# Patient Record
Sex: FEMALE | Race: WHITE | Employment: OTHER | URBAN - METROPOLITAN AREA
[De-identification: names, ages, dates, MRNs, and addresses within clinical notes are randomized per-mention and may not be internally consistent; named-entity substitution may affect disease eponyms.]

---

## 2017-05-16 ENCOUNTER — HOSPITAL ENCOUNTER (OUTPATIENT)
Dept: PSYCHIATRY | Age: 37
Discharge: HOME | End: 2017-05-31
Admitting: PSYCHIATRY & NEUROLOGY

## 2017-05-16 VITALS — DIASTOLIC BLOOD PRESSURE: 78 MMHG | SYSTOLIC BLOOD PRESSURE: 130 MMHG | HEART RATE: 109 BPM

## 2017-05-18 ENCOUNTER — HOSPITAL ENCOUNTER (OUTPATIENT)
Dept: PSYCHIATRY | Age: 37
Discharge: HOME | End: 2017-05-18
Admitting: PSYCHIATRY & NEUROLOGY

## 2017-05-18 PROCEDURE — 99232 SBSQ HOSP IP/OBS MODERATE 35: CPT | Performed by: PHYSICIAN ASSISTANT

## 2017-05-18 ASSESSMENT — ENCOUNTER SYMPTOMS
BLURRED VISION: 0
CONSTIPATION: 0
VOMITING: 0
DIARRHEA: 0
NAUSEA: 0
ABDOMINAL PAIN: 0
BACK PAIN: 0
DOUBLE VISION: 0
SHORTNESS OF BREATH: 0

## 2017-05-22 ENCOUNTER — HOSPITAL ENCOUNTER (OUTPATIENT)
Dept: PSYCHIATRY | Age: 37
Discharge: HOME | End: 2017-05-22
Admitting: PSYCHIATRY & NEUROLOGY

## 2017-05-22 VITALS — SYSTOLIC BLOOD PRESSURE: 130 MMHG | RESPIRATION RATE: 16 BRPM | HEART RATE: 134 BPM | DIASTOLIC BLOOD PRESSURE: 90 MMHG

## 2017-05-23 ENCOUNTER — HOSPITAL ENCOUNTER (OUTPATIENT)
Dept: PSYCHIATRY | Age: 37
Discharge: HOME | End: 2017-05-23
Admitting: PSYCHIATRY & NEUROLOGY

## 2017-05-24 ENCOUNTER — HOSPITAL ENCOUNTER (OUTPATIENT)
Dept: PSYCHIATRY | Age: 37
Discharge: HOME | End: 2017-05-24
Admitting: PSYCHIATRY & NEUROLOGY

## 2017-05-24 PROCEDURE — 99214 OFFICE O/P EST MOD 30 MIN: CPT | Performed by: PHYSICIAN ASSISTANT

## 2017-05-24 RX ORDER — BENZTROPINE MESYLATE 0.5 MG/1
0.5 TABLET ORAL NIGHTLY
Qty: 30 TABLET | Refills: 0 | Status: SHIPPED | OUTPATIENT
Start: 2017-05-24 | End: 2018-01-05 | Stop reason: ALTCHOICE

## 2017-05-24 ASSESSMENT — ENCOUNTER SYMPTOMS
BACK PAIN: 0
DOUBLE VISION: 0
BLURRED VISION: 0
NAUSEA: 0
SHORTNESS OF BREATH: 0
HEARTBURN: 0
DIARRHEA: 0
VOMITING: 0
CONSTIPATION: 0
ABDOMINAL PAIN: 0

## 2017-06-01 ENCOUNTER — HOSPITAL ENCOUNTER (OUTPATIENT)
Dept: PSYCHIATRY | Age: 37
Discharge: HOME | End: 2017-06-30
Attending: PSYCHIATRY & NEUROLOGY | Admitting: PSYCHIATRY & NEUROLOGY

## 2018-01-20 PROBLEM — K52.9 COLITIS: Status: ACTIVE | Noted: 2018-01-20

## 2018-04-14 PROBLEM — R56.9 ALCOHOL WITHDRAWAL SEIZURE WITHOUT COMPLICATION (HCC): Status: ACTIVE | Noted: 2018-04-14

## 2018-04-14 PROBLEM — F10.930 ALCOHOL WITHDRAWAL SEIZURE WITHOUT COMPLICATION (HCC): Status: ACTIVE | Noted: 2018-04-14

## 2018-04-17 PROBLEM — F10.930 ALCOHOL WITHDRAWAL SEIZURE WITHOUT COMPLICATION (HCC): Status: RESOLVED | Noted: 2018-04-14 | Resolved: 2018-04-17

## 2018-04-17 PROBLEM — F10.10 ALCOHOL ABUSE: Status: ACTIVE | Noted: 2018-04-17

## 2018-04-17 PROBLEM — R56.9 ALCOHOL WITHDRAWAL SEIZURE WITHOUT COMPLICATION (HCC): Status: RESOLVED | Noted: 2018-04-14 | Resolved: 2018-04-17

## 2018-04-17 PROBLEM — F33.0 MDD (MAJOR DEPRESSIVE DISORDER), RECURRENT EPISODE, MILD (HCC): Status: ACTIVE | Noted: 2018-04-17

## 2018-04-26 PROBLEM — R74.01 TRANSAMINITIS: Status: ACTIVE | Noted: 2018-04-26

## 2018-12-09 ENCOUNTER — HOSPITAL ENCOUNTER (EMERGENCY)
Age: 38
Discharge: HOME | End: 2018-12-09
Attending: EMERGENCY MEDICINE
Payer: COMMERCIAL

## 2018-12-09 VITALS
DIASTOLIC BLOOD PRESSURE: 76 MMHG | TEMPERATURE: 98 F | OXYGEN SATURATION: 98 % | WEIGHT: 187.39 LBS | BODY MASS INDEX: 35.38 KG/M2 | RESPIRATION RATE: 16 BRPM | SYSTOLIC BLOOD PRESSURE: 119 MMHG | HEART RATE: 95 BPM | HEIGHT: 61 IN

## 2018-12-09 DIAGNOSIS — M43.6 TORTICOLLIS: Primary | ICD-10-CM

## 2018-12-09 PROCEDURE — 96372 THER/PROPH/DIAG INJ SC/IM: CPT

## 2018-12-09 PROCEDURE — 99283 EMERGENCY DEPT VISIT LOW MDM: CPT

## 2018-12-09 PROCEDURE — 6360000002 HC RX W HCPCS: Performed by: EMERGENCY MEDICINE

## 2018-12-09 RX ORDER — IBUPROFEN 800 MG/1
800 TABLET ORAL EVERY 6 HOURS PRN
Qty: 30 TABLET | Refills: 0 | Status: SHIPPED | OUTPATIENT
Start: 2018-12-09 | End: 2019-01-31

## 2018-12-09 RX ORDER — KETOROLAC TROMETHAMINE 30 MG/ML
60 INJECTION, SOLUTION INTRAMUSCULAR; INTRAVENOUS ONCE
Status: COMPLETED | OUTPATIENT
Start: 2018-12-09 | End: 2018-12-09

## 2018-12-09 RX ORDER — ORPHENADRINE CITRATE 30 MG/ML
60 INJECTION INTRAMUSCULAR; INTRAVENOUS ONCE
Status: COMPLETED | OUTPATIENT
Start: 2018-12-09 | End: 2018-12-09

## 2018-12-09 RX ORDER — CYCLOBENZAPRINE HCL 10 MG
10 TABLET ORAL 3 TIMES DAILY PRN
Qty: 30 TABLET | Refills: 0 | Status: SHIPPED | OUTPATIENT
Start: 2018-12-09 | End: 2018-12-19

## 2018-12-09 RX ADMIN — KETOROLAC TROMETHAMINE 60 MG: 30 INJECTION, SOLUTION INTRAMUSCULAR at 14:43

## 2018-12-09 RX ADMIN — ORPHENADRINE CITRATE 60 MG: 30 INJECTION INTRAMUSCULAR; INTRAVENOUS at 14:42

## 2018-12-09 ASSESSMENT — PAIN SCALES - GENERAL
PAINLEVEL_OUTOF10: 6
PAINLEVEL_OUTOF10: 5
PAINLEVEL_OUTOF10: 6

## 2018-12-09 ASSESSMENT — PAIN - FUNCTIONAL ASSESSMENT: PAIN_FUNCTIONAL_ASSESSMENT: 0-10

## 2018-12-09 ASSESSMENT — PAIN DESCRIPTION - PAIN TYPE
TYPE: ACUTE PAIN
TYPE: ACUTE PAIN

## 2018-12-09 ASSESSMENT — PAIN DESCRIPTION - ORIENTATION: ORIENTATION: RIGHT

## 2018-12-09 ASSESSMENT — PAIN DESCRIPTION - LOCATION: LOCATION: NECK

## 2018-12-09 NOTE — ED PROVIDER NOTES
[address] [facility]  eMERGENCY dEPARTMENT eNCOUnter      Pt Name: Kathern Kocher  MRN: [id number]  [place] 1980  Date of evaluation: 12/9/2018  Provider: Yahir Becerra MD    [address] [place] [address]       Chief Complaint   Patient presents with    Neck Pain     no known injury. right side x 1 week. HISTORY OF PRESENT ILLNESS  (Location/Symptom, Timing/Onset, Context/Setting, Quality, Duration, Modifying Factors, Severity.)   Kathern Kocher is a 34 y.o. female who presents to the emergency department Complaining of right-sided neck pain for one week. She states she woke up one morning and noticed some discomfort. She's been using ibuprofen which seems to be helping until last night when the pain became worse. It radiates up her right neck and is giving her headaches. She states it feels like her shoulders are tightened up. There is no radiation of the pain down the arms. No other back pain. No extremity numbness tingling or paresthesias. No history of injury. No recent illness. No chest pain or shortness of breath. Nursing Notes were reviewed and I agree. REVIEW OF SYSTEMS    (2-9 systems for level 4, 10 or more for level 5)     Gen.: No fever or chills. Eyes: No discharge or redness. ENT: No earache sore throat or nasal congestion. Cardiovascular: No chest pain. Pulmonary: No shortness of breath or cough. GI: No abdominal pain. Muscular skeletal: Right posterior neck and shoulder pain. Worse with movement, nonradiating. Neuro: No extremity numbness weakness or paresthesias. Except as noted above the remainder of the review of systems was reviewed and negative. PAST MEDICAL HISTORY         Diagnosis Date    Acute ischemic colitis ([address] 10/20/15    Anesthesia complication     wakes from anesthesia with migraine    Asthma     Had real bad as child and then grew out of it and  then got pregnant it started acting up again.     Bipolar 1 disorder ([address]     display    All other labs were within normal range or not returned as of this dictation. EMERGENCY DEPARTMENT COURSE and DIFFERENTIAL DIAGNOSIS/MDM:   Vitals:    Vitals:    12/09/18 1427   BP: 119/76   Pulse: 95   Resp: 16   Temp: 98 °F (36.7 °C)   TempSrc: Oral   SpO2: 98%   Weight: 187 lb 6.3 oz (85 kg)   Height: 5' 1\" (1.549 m)       Clinical presentation is consistent with torticollis. She has no mechanism for bony injury. She has no radicular symptoms or neurologic symptoms to suggest a cervical radiculopathy. Is no rash to suggest shingles. No recent illness or other complaints. She'll be given Norflex and Toradol IM here. She'll be discharged on Motrin and Flexeril. I recommended local heat to the area. I recommended follow-up with her primary care physician in 3 days if not improved. PROCEDURES:  None    FINAL IMPRESSION      1.  Torticollis          DISPOSITION/PLAN   DISPOSITION Decision To Discharge 12/09/2018 02:37:45 PM      PATIENT REFERRED TO:  NILES [facility]  [address] [address]  [phone]    In 3 days  If symptoms worsen      DISCHARGE MEDICATIONS:  New Prescriptions    CYCLOBENZAPRINE (FLEXERIL) 10 MG TABLET    Take 1 tablet by mouth 3 times daily as needed for Muscle spasms    IBUPROFEN (ADVIL;MOTRIN) 800 MG TABLET    Take 1 tablet by mouth every 6 hours as needed for Pain       (Please note that portions of this note were completed with a voice recognition program.  Efforts were made to edit the dictations but occasionally words are mis-transcribed.)    Kenney Griffith MD  Attending Emergency Physician        Kamille Clarke MD  12/09/18 7067

## 2018-12-28 ENCOUNTER — HOSPITAL ENCOUNTER (OUTPATIENT)
Age: 38
Discharge: HOME | End: 2018-12-28
Payer: COMMERCIAL

## 2018-12-28 LAB
A/G RATIO: 1.2 (ref 1.1–2.2)
ALBUMIN SERPL-MCNC: 4.2 G/DL (ref 3.4–5)
ALP BLD-CCNC: 104 U/L (ref 40–129)
ALT SERPL-CCNC: 14 U/L (ref 10–40)
ANION GAP SERPL CALCULATED.3IONS-SCNC: 11 MMOL/L (ref 3–16)
AST SERPL-CCNC: 17 U/L (ref 15–37)
BASOPHILS ABSOLUTE: 0.1 K/UL (ref 0–0.2)
BASOPHILS RELATIVE PERCENT: 1.1 %
BILIRUB SERPL-MCNC: 0.3 MG/DL (ref 0–1)
BUN BLDV-MCNC: 14 MG/DL (ref 7–20)
CALCIUM SERPL-MCNC: 10.5 MG/DL (ref 8.3–10.6)
CHLORIDE BLD-SCNC: 100 MMOL/L (ref 99–110)
CHOLESTEROL, TOTAL: 236 MG/DL (ref 0–199)
CO2: 30 MMOL/L (ref 21–32)
CREAT SERPL-MCNC: 1 MG/DL (ref 0.6–1.1)
EOSINOPHILS ABSOLUTE: 0.3 K/UL (ref 0–0.6)
EOSINOPHILS RELATIVE PERCENT: 4.5 %
GFR AFRICAN AMERICAN: >60
GFR NON-AFRICAN AMERICAN: >60
GLOBULIN: 3.5 G/DL
GLUCOSE BLD-MCNC: 83 MG/DL (ref 70–99)
HCT VFR BLD CALC: 44 % (ref 36–48)
HDLC SERPL-MCNC: 58 MG/DL (ref 40–60)
HEMOGLOBIN: 15 G/DL (ref 12–16)
LDL CHOLESTEROL CALCULATED: 136 MG/DL
LYMPHOCYTES ABSOLUTE: 2.4 K/UL (ref 1–5.1)
LYMPHOCYTES RELATIVE PERCENT: 31.4 %
MCH RBC QN AUTO: 34.1 PG (ref 26–34)
MCHC RBC AUTO-ENTMCNC: 34 G/DL (ref 31–36)
MCV RBC AUTO: 100.2 FL (ref 80–100)
MONOCYTES ABSOLUTE: 0.6 K/UL (ref 0–1.3)
MONOCYTES RELATIVE PERCENT: 7.2 %
NEUTROPHILS ABSOLUTE: 4.3 K/UL (ref 1.7–7.7)
NEUTROPHILS RELATIVE PERCENT: 55.8 %
PDW BLD-RTO: 13.7 % (ref 12.4–15.4)
PLATELET # BLD: 250 K/UL (ref 135–450)
PMV BLD AUTO: 9.2 FL (ref 5–10.5)
POTASSIUM SERPL-SCNC: 5.2 MMOL/L (ref 3.5–5.1)
RBC # BLD: 4.39 M/UL (ref 4–5.2)
SODIUM BLD-SCNC: 141 MMOL/L (ref 136–145)
TOTAL PROTEIN: 7.7 G/DL (ref 6.4–8.2)
TRIGL SERPL-MCNC: 209 MG/DL (ref 0–150)
TSH SERPL DL<=0.05 MIU/L-ACNC: 1.98 UIU/ML (ref 0.27–4.2)
VLDLC SERPL CALC-MCNC: 42 MG/DL
WBC # BLD: 7.7 K/UL (ref 4–11)

## 2018-12-28 PROCEDURE — 83036 HEMOGLOBIN GLYCOSYLATED A1C: CPT

## 2018-12-28 PROCEDURE — 80053 COMPREHEN METABOLIC PANEL: CPT

## 2018-12-28 PROCEDURE — 80061 LIPID PANEL: CPT

## 2018-12-28 PROCEDURE — 85025 COMPLETE CBC W/AUTO DIFF WBC: CPT

## 2018-12-28 PROCEDURE — 84443 ASSAY THYROID STIM HORMONE: CPT

## 2018-12-28 PROCEDURE — 36415 COLL VENOUS BLD VENIPUNCTURE: CPT

## 2018-12-29 LAB
ESTIMATED AVERAGE GLUCOSE: 105.4 MG/DL
HBA1C MFR BLD: 5.3 %

## 2019-01-21 ENCOUNTER — HOSPITAL ENCOUNTER (INPATIENT)
Age: 39
LOS: 2 days | Discharge: PSYCHIATRIC OR SUBSTANCE USE FACILITY | DRG: 773 | End: 2019-01-23
Attending: EMERGENCY MEDICINE | Admitting: INTERNAL MEDICINE
Payer: COMMERCIAL

## 2019-01-21 DIAGNOSIS — F10.939 ALCOHOL WITHDRAWAL SYNDROME WITH COMPLICATION (HCC): Primary | ICD-10-CM

## 2019-01-21 PROBLEM — F10.931 ALCOHOL WITHDRAWAL DELIRIUM (HCC): Status: ACTIVE | Noted: 2019-01-21

## 2019-01-21 LAB
A/G RATIO: 1.3 (ref 1.1–2.2)
ALBUMIN SERPL-MCNC: 4.1 G/DL (ref 3.4–5)
ALP BLD-CCNC: 90 U/L (ref 40–129)
ALT SERPL-CCNC: 10 U/L (ref 10–40)
ANION GAP SERPL CALCULATED.3IONS-SCNC: 11 MMOL/L (ref 3–16)
AST SERPL-CCNC: 15 U/L (ref 15–37)
BASOPHILS ABSOLUTE: 0.1 K/UL (ref 0–0.2)
BASOPHILS RELATIVE PERCENT: 0.6 %
BILIRUB SERPL-MCNC: 0.5 MG/DL (ref 0–1)
BUN BLDV-MCNC: 15 MG/DL (ref 7–20)
CALCIUM SERPL-MCNC: 9.8 MG/DL (ref 8.3–10.6)
CHLORIDE BLD-SCNC: 102 MMOL/L (ref 99–110)
CO2: 28 MMOL/L (ref 21–32)
CREAT SERPL-MCNC: 1 MG/DL (ref 0.6–1.1)
EOSINOPHILS ABSOLUTE: 0.3 K/UL (ref 0–0.6)
EOSINOPHILS RELATIVE PERCENT: 3.3 %
ETHANOL: NORMAL MG/DL (ref 0–0.08)
GFR AFRICAN AMERICAN: >60
GFR NON-AFRICAN AMERICAN: >60
GLOBULIN: 3.1 G/DL
GLUCOSE BLD-MCNC: 115 MG/DL (ref 70–99)
HCT VFR BLD CALC: 43.3 % (ref 36–48)
HEMOGLOBIN: 14.6 G/DL (ref 12–16)
LYMPHOCYTES ABSOLUTE: 2 K/UL (ref 1–5.1)
LYMPHOCYTES RELATIVE PERCENT: 22.6 %
MCH RBC QN AUTO: 33.6 PG (ref 26–34)
MCHC RBC AUTO-ENTMCNC: 33.6 G/DL (ref 31–36)
MCV RBC AUTO: 100 FL (ref 80–100)
MONOCYTES ABSOLUTE: 0.6 K/UL (ref 0–1.3)
MONOCYTES RELATIVE PERCENT: 7.1 %
NEUTROPHILS ABSOLUTE: 5.9 K/UL (ref 1.7–7.7)
NEUTROPHILS RELATIVE PERCENT: 66.4 %
PDW BLD-RTO: 13.2 % (ref 12.4–15.4)
PLATELET # BLD: 219 K/UL (ref 135–450)
PMV BLD AUTO: 9.3 FL (ref 5–10.5)
POTASSIUM SERPL-SCNC: 3.9 MMOL/L (ref 3.5–5.1)
RBC # BLD: 4.33 M/UL (ref 4–5.2)
SODIUM BLD-SCNC: 141 MMOL/L (ref 136–145)
TOTAL PROTEIN: 7.2 G/DL (ref 6.4–8.2)
WBC # BLD: 8.9 K/UL (ref 4–11)

## 2019-01-21 PROCEDURE — 85025 COMPLETE CBC W/AUTO DIFF WBC: CPT

## 2019-01-21 PROCEDURE — 6370000000 HC RX 637 (ALT 250 FOR IP): Performed by: INTERNAL MEDICINE

## 2019-01-21 PROCEDURE — G0480 DRUG TEST DEF 1-7 CLASSES: HCPCS

## 2019-01-21 PROCEDURE — 6360000002 HC RX W HCPCS: Performed by: INTERNAL MEDICINE

## 2019-01-21 PROCEDURE — 96376 TX/PRO/DX INJ SAME DRUG ADON: CPT

## 2019-01-21 PROCEDURE — 2580000003 HC RX 258: Performed by: INTERNAL MEDICINE

## 2019-01-21 PROCEDURE — 80053 COMPREHEN METABOLIC PANEL: CPT

## 2019-01-21 PROCEDURE — 2060000000 HC ICU INTERMEDIATE R&B

## 2019-01-21 PROCEDURE — 94150 VITAL CAPACITY TEST: CPT

## 2019-01-21 PROCEDURE — 6360000002 HC RX W HCPCS: Performed by: EMERGENCY MEDICINE

## 2019-01-21 PROCEDURE — 96374 THER/PROPH/DIAG INJ IV PUSH: CPT

## 2019-01-21 PROCEDURE — 99285 EMERGENCY DEPT VISIT HI MDM: CPT

## 2019-01-21 PROCEDURE — 2500000003 HC RX 250 WO HCPCS: Performed by: EMERGENCY MEDICINE

## 2019-01-21 PROCEDURE — 2580000003 HC RX 258: Performed by: EMERGENCY MEDICINE

## 2019-01-21 RX ORDER — LORAZEPAM 2 MG/ML
2 INJECTION INTRAMUSCULAR
Status: DISCONTINUED | OUTPATIENT
Start: 2019-01-21 | End: 2019-01-21

## 2019-01-21 RX ORDER — LORAZEPAM 1 MG/1
3 TABLET ORAL
Status: DISCONTINUED | OUTPATIENT
Start: 2019-01-21 | End: 2019-01-21

## 2019-01-21 RX ORDER — SODIUM CHLORIDE 0.9 % (FLUSH) 0.9 %
10 SYRINGE (ML) INJECTION PRN
Status: DISCONTINUED | OUTPATIENT
Start: 2019-01-21 | End: 2019-01-23 | Stop reason: HOSPADM

## 2019-01-21 RX ORDER — GABAPENTIN 100 MG/1
100 CAPSULE ORAL 2 TIMES DAILY
Status: DISCONTINUED | OUTPATIENT
Start: 2019-01-21 | End: 2019-01-22

## 2019-01-21 RX ORDER — SODIUM CHLORIDE 0.9 % (FLUSH) 0.9 %
10 SYRINGE (ML) INJECTION EVERY 12 HOURS SCHEDULED
Status: DISCONTINUED | OUTPATIENT
Start: 2019-01-21 | End: 2019-01-21

## 2019-01-21 RX ORDER — LORAZEPAM 1 MG/1
4 TABLET ORAL
Status: DISCONTINUED | OUTPATIENT
Start: 2019-01-21 | End: 2019-01-21

## 2019-01-21 RX ORDER — LORAZEPAM 2 MG/ML
3 INJECTION INTRAMUSCULAR
Status: DISCONTINUED | OUTPATIENT
Start: 2019-01-21 | End: 2019-01-23 | Stop reason: HOSPADM

## 2019-01-21 RX ORDER — LORAZEPAM 2 MG/ML
1 INJECTION INTRAMUSCULAR
Status: DISCONTINUED | OUTPATIENT
Start: 2019-01-21 | End: 2019-01-21

## 2019-01-21 RX ORDER — LORAZEPAM 2 MG/ML
2 INJECTION INTRAMUSCULAR
Status: DISCONTINUED | OUTPATIENT
Start: 2019-01-21 | End: 2019-01-23 | Stop reason: HOSPADM

## 2019-01-21 RX ORDER — CHLORDIAZEPOXIDE HYDROCHLORIDE 25 MG/1
25 CAPSULE, GELATIN COATED ORAL EVERY 6 HOURS
Status: DISCONTINUED | OUTPATIENT
Start: 2019-01-21 | End: 2019-01-23 | Stop reason: HOSPADM

## 2019-01-21 RX ORDER — LORAZEPAM 1 MG/1
2 TABLET ORAL
Status: DISCONTINUED | OUTPATIENT
Start: 2019-01-21 | End: 2019-01-21

## 2019-01-21 RX ORDER — FOLIC ACID 1 MG/1
1 TABLET ORAL DAILY
Status: DISCONTINUED | OUTPATIENT
Start: 2019-01-21 | End: 2019-01-23 | Stop reason: HOSPADM

## 2019-01-21 RX ORDER — ALBUTEROL SULFATE 90 UG/1
2 AEROSOL, METERED RESPIRATORY (INHALATION) EVERY 4 HOURS PRN
Status: DISCONTINUED | OUTPATIENT
Start: 2019-01-21 | End: 2019-01-23 | Stop reason: HOSPADM

## 2019-01-21 RX ORDER — SODIUM CHLORIDE 0.9 % (FLUSH) 0.9 %
10 SYRINGE (ML) INJECTION PRN
Status: DISCONTINUED | OUTPATIENT
Start: 2019-01-21 | End: 2019-01-21

## 2019-01-21 RX ORDER — LORAZEPAM 2 MG/ML
1 INJECTION INTRAMUSCULAR
Status: DISCONTINUED | OUTPATIENT
Start: 2019-01-21 | End: 2019-01-23 | Stop reason: HOSPADM

## 2019-01-21 RX ORDER — LORAZEPAM 1 MG/1
1 TABLET ORAL
Status: DISCONTINUED | OUTPATIENT
Start: 2019-01-21 | End: 2019-01-23 | Stop reason: HOSPADM

## 2019-01-21 RX ORDER — ONDANSETRON 2 MG/ML
4 INJECTION INTRAMUSCULAR; INTRAVENOUS EVERY 6 HOURS PRN
Status: DISCONTINUED | OUTPATIENT
Start: 2019-01-21 | End: 2019-01-23 | Stop reason: HOSPADM

## 2019-01-21 RX ORDER — LANOLIN ALCOHOL/MO/W.PET/CERES
6 CREAM (GRAM) TOPICAL NIGHTLY
Status: DISCONTINUED | OUTPATIENT
Start: 2019-01-21 | End: 2019-01-23 | Stop reason: HOSPADM

## 2019-01-21 RX ORDER — LORAZEPAM 2 MG/1
4 TABLET ORAL
Status: DISCONTINUED | OUTPATIENT
Start: 2019-01-21 | End: 2019-01-23 | Stop reason: HOSPADM

## 2019-01-21 RX ORDER — ALBUTEROL SULFATE 90 UG/1
2 AEROSOL, METERED RESPIRATORY (INHALATION) EVERY 6 HOURS PRN
Status: DISCONTINUED | OUTPATIENT
Start: 2019-01-21 | End: 2019-01-21

## 2019-01-21 RX ORDER — LORAZEPAM 2 MG/ML
4 INJECTION INTRAMUSCULAR ONCE
Status: DISCONTINUED | OUTPATIENT
Start: 2019-01-21 | End: 2019-01-21

## 2019-01-21 RX ORDER — GABAPENTIN 800 MG/1
800 TABLET ORAL 3 TIMES DAILY
COMMUNITY
End: 2019-09-06

## 2019-01-21 RX ORDER — TEMAZEPAM 15 MG/1
30 CAPSULE ORAL NIGHTLY PRN
Status: ON HOLD | COMMUNITY
End: 2019-01-23 | Stop reason: HOSPADM

## 2019-01-21 RX ORDER — LORAZEPAM 2 MG/ML
4 INJECTION INTRAMUSCULAR
Status: DISCONTINUED | OUTPATIENT
Start: 2019-01-21 | End: 2019-01-23 | Stop reason: HOSPADM

## 2019-01-21 RX ORDER — LORAZEPAM 2 MG/ML
3 INJECTION INTRAMUSCULAR
Status: DISCONTINUED | OUTPATIENT
Start: 2019-01-21 | End: 2019-01-21

## 2019-01-21 RX ORDER — LORAZEPAM 2 MG/1
2 TABLET ORAL
Status: DISCONTINUED | OUTPATIENT
Start: 2019-01-21 | End: 2019-01-23 | Stop reason: HOSPADM

## 2019-01-21 RX ORDER — SODIUM CHLORIDE 0.9 % (FLUSH) 0.9 %
10 SYRINGE (ML) INJECTION EVERY 12 HOURS SCHEDULED
Status: DISCONTINUED | OUTPATIENT
Start: 2019-01-21 | End: 2019-01-23 | Stop reason: HOSPADM

## 2019-01-21 RX ORDER — LORAZEPAM 2 MG/ML
4 INJECTION INTRAMUSCULAR
Status: DISCONTINUED | OUTPATIENT
Start: 2019-01-21 | End: 2019-01-21

## 2019-01-21 RX ORDER — SODIUM CHLORIDE 9 MG/ML
INJECTION, SOLUTION INTRAVENOUS CONTINUOUS
Status: DISCONTINUED | OUTPATIENT
Start: 2019-01-21 | End: 2019-01-23 | Stop reason: HOSPADM

## 2019-01-21 RX ORDER — LORAZEPAM 1 MG/1
1 TABLET ORAL
Status: DISCONTINUED | OUTPATIENT
Start: 2019-01-21 | End: 2019-01-21

## 2019-01-21 RX ORDER — THIAMINE MONONITRATE (VIT B1) 100 MG
100 TABLET ORAL DAILY
Status: DISCONTINUED | OUTPATIENT
Start: 2019-01-21 | End: 2019-01-23 | Stop reason: HOSPADM

## 2019-01-21 RX ADMIN — SODIUM CHLORIDE: 9 INJECTION, SOLUTION INTRAVENOUS at 20:23

## 2019-01-21 RX ADMIN — LORAZEPAM 2 MG: 2 INJECTION INTRAMUSCULAR at 17:31

## 2019-01-21 RX ADMIN — LORAZEPAM 2 MG: 2 INJECTION INTRAMUSCULAR at 18:39

## 2019-01-21 RX ADMIN — LORAZEPAM 2 MG: 2 INJECTION INTRAMUSCULAR at 20:24

## 2019-01-21 RX ADMIN — LORAZEPAM 4 MG: 2 INJECTION INTRAMUSCULAR at 21:54

## 2019-01-21 RX ADMIN — CHLORDIAZEPOXIDE HYDROCHLORIDE 25 MG: 25 CAPSULE ORAL at 20:23

## 2019-01-21 RX ADMIN — Medication 10 ML: at 20:24

## 2019-01-21 RX ADMIN — LORAZEPAM 4 MG: 2 INJECTION INTRAMUSCULAR at 16:28

## 2019-01-21 RX ADMIN — ENOXAPARIN SODIUM 40 MG: 40 INJECTION SUBCUTANEOUS at 20:24

## 2019-01-21 RX ADMIN — Medication 100 MG: at 20:23

## 2019-01-21 RX ADMIN — THIAMINE HYDROCHLORIDE: 100 INJECTION, SOLUTION INTRAMUSCULAR; INTRAVENOUS at 17:05

## 2019-01-22 ENCOUNTER — APPOINTMENT (OUTPATIENT)
Dept: CT IMAGING | Age: 39
DRG: 773 | End: 2019-01-22
Payer: COMMERCIAL

## 2019-01-22 PROBLEM — F10.939 ALCOHOL WITHDRAWAL SYNDROME WITH COMPLICATION (HCC): Status: ACTIVE | Noted: 2019-01-21

## 2019-01-22 PROCEDURE — 99222 1ST HOSP IP/OBS MODERATE 55: CPT | Performed by: PHYSICIAN ASSISTANT

## 2019-01-22 PROCEDURE — 6370000000 HC RX 637 (ALT 250 FOR IP): Performed by: INTERNAL MEDICINE

## 2019-01-22 PROCEDURE — 2060000000 HC ICU INTERMEDIATE R&B

## 2019-01-22 PROCEDURE — 70450 CT HEAD/BRAIN W/O DYE: CPT

## 2019-01-22 PROCEDURE — 2580000003 HC RX 258: Performed by: INTERNAL MEDICINE

## 2019-01-22 PROCEDURE — 6370000000 HC RX 637 (ALT 250 FOR IP): Performed by: PHYSICIAN ASSISTANT

## 2019-01-22 PROCEDURE — 6360000002 HC RX W HCPCS: Performed by: INTERNAL MEDICINE

## 2019-01-22 RX ORDER — GABAPENTIN 400 MG/1
800 CAPSULE ORAL 3 TIMES DAILY
Status: DISCONTINUED | OUTPATIENT
Start: 2019-01-22 | End: 2019-01-23 | Stop reason: HOSPADM

## 2019-01-22 RX ORDER — GABAPENTIN 400 MG/1
800 CAPSULE ORAL 3 TIMES DAILY
Status: DISCONTINUED | OUTPATIENT
Start: 2019-01-22 | End: 2019-01-22

## 2019-01-22 RX ORDER — NICOTINE 21 MG/24HR
1 PATCH, TRANSDERMAL 24 HOURS TRANSDERMAL DAILY
Status: DISCONTINUED | OUTPATIENT
Start: 2019-01-22 | End: 2019-01-23 | Stop reason: HOSPADM

## 2019-01-22 RX ORDER — LORAZEPAM 2 MG/ML
6 INJECTION INTRAMUSCULAR ONCE
Status: COMPLETED | OUTPATIENT
Start: 2019-01-22 | End: 2019-01-22

## 2019-01-22 RX ORDER — LORAZEPAM 2 MG/ML
4 INJECTION INTRAMUSCULAR ONCE
Status: DISCONTINUED | OUTPATIENT
Start: 2019-01-22 | End: 2019-01-22

## 2019-01-22 RX ADMIN — MELATONIN 3 MG ORAL TABLET 6 MG: 3 TABLET ORAL at 20:18

## 2019-01-22 RX ADMIN — FOLIC ACID 1 MG: 1 TABLET ORAL at 08:42

## 2019-01-22 RX ADMIN — LORAZEPAM 3 MG: 2 INJECTION INTRAMUSCULAR at 08:47

## 2019-01-22 RX ADMIN — Medication 10 ML: at 08:52

## 2019-01-22 RX ADMIN — GABAPENTIN 800 MG: 400 CAPSULE ORAL at 14:57

## 2019-01-22 RX ADMIN — CHLORDIAZEPOXIDE HYDROCHLORIDE 25 MG: 25 CAPSULE ORAL at 13:51

## 2019-01-22 RX ADMIN — LORAZEPAM 2 MG: 2 INJECTION INTRAMUSCULAR at 20:19

## 2019-01-22 RX ADMIN — LORAZEPAM 2 MG: 2 INJECTION INTRAMUSCULAR at 04:08

## 2019-01-22 RX ADMIN — LORAZEPAM 4 MG: 2 INJECTION INTRAMUSCULAR at 14:01

## 2019-01-22 RX ADMIN — CHLORDIAZEPOXIDE HYDROCHLORIDE 25 MG: 25 CAPSULE ORAL at 20:04

## 2019-01-22 RX ADMIN — SODIUM CHLORIDE: 9 INJECTION, SOLUTION INTRAVENOUS at 04:08

## 2019-01-22 RX ADMIN — GABAPENTIN 800 MG: 400 CAPSULE ORAL at 20:18

## 2019-01-22 RX ADMIN — CHLORDIAZEPOXIDE HYDROCHLORIDE 25 MG: 25 CAPSULE ORAL at 08:42

## 2019-01-22 RX ADMIN — LORAZEPAM 6 MG: 2 INJECTION INTRAMUSCULAR; INTRAVENOUS at 02:22

## 2019-01-22 RX ADMIN — LORAZEPAM 2 MG: 2 INJECTION INTRAMUSCULAR at 18:30

## 2019-01-22 RX ADMIN — CHLORDIAZEPOXIDE HYDROCHLORIDE 25 MG: 25 CAPSULE ORAL at 02:22

## 2019-01-22 RX ADMIN — LORAZEPAM 3 MG: 2 INJECTION INTRAMUSCULAR at 10:23

## 2019-01-22 RX ADMIN — ENOXAPARIN SODIUM 40 MG: 40 INJECTION SUBCUTANEOUS at 08:53

## 2019-01-22 RX ADMIN — GABAPENTIN 100 MG: 100 CAPSULE ORAL at 08:42

## 2019-01-22 RX ADMIN — LORAZEPAM 4 MG: 2 INJECTION INTRAMUSCULAR at 15:30

## 2019-01-22 RX ADMIN — Medication 10 ML: at 20:18

## 2019-01-22 RX ADMIN — Medication 100 MG: at 08:42

## 2019-01-22 RX ADMIN — LORAZEPAM 3 MG: 2 INJECTION INTRAMUSCULAR at 17:12

## 2019-01-22 RX ADMIN — SODIUM CHLORIDE: 9 INJECTION, SOLUTION INTRAVENOUS at 15:33

## 2019-01-22 RX ADMIN — LORAZEPAM 2 MG: 2 INJECTION INTRAMUSCULAR at 12:46

## 2019-01-22 RX ADMIN — TRAZODONE HYDROCHLORIDE 150 MG: 100 TABLET ORAL at 02:22

## 2019-01-23 ENCOUNTER — HOSPITAL ENCOUNTER (INPATIENT)
Age: 39
LOS: 2 days | Discharge: LEFT BEFORE DISCHARGE | DRG: 773 | End: 2019-01-25
Attending: PSYCHIATRY & NEUROLOGY | Admitting: PSYCHIATRY & NEUROLOGY
Payer: COMMERCIAL

## 2019-01-23 VITALS
HEART RATE: 96 BPM | RESPIRATION RATE: 13 BRPM | SYSTOLIC BLOOD PRESSURE: 116 MMHG | DIASTOLIC BLOOD PRESSURE: 77 MMHG | WEIGHT: 194.7 LBS | OXYGEN SATURATION: 97 % | TEMPERATURE: 98.1 F | HEIGHT: 61 IN | BODY MASS INDEX: 36.76 KG/M2

## 2019-01-23 PROBLEM — F31.9 BIPOLAR DISORDER (HCC): Status: ACTIVE | Noted: 2019-01-23

## 2019-01-23 LAB
GLUCOSE BLD-MCNC: 91 MG/DL (ref 70–99)
PERFORMED ON: NORMAL

## 2019-01-23 PROCEDURE — 94150 VITAL CAPACITY TEST: CPT

## 2019-01-23 PROCEDURE — 6360000002 HC RX W HCPCS: Performed by: INTERNAL MEDICINE

## 2019-01-23 PROCEDURE — 94664 DEMO&/EVAL PT USE INHALER: CPT

## 2019-01-23 PROCEDURE — 6370000000 HC RX 637 (ALT 250 FOR IP): Performed by: INTERNAL MEDICINE

## 2019-01-23 PROCEDURE — 99238 HOSP IP/OBS DSCHRG MGMT 30/<: CPT | Performed by: INTERNAL MEDICINE

## 2019-01-23 PROCEDURE — 6370000000 HC RX 637 (ALT 250 FOR IP): Performed by: PSYCHIATRY & NEUROLOGY

## 2019-01-23 PROCEDURE — 2580000003 HC RX 258: Performed by: INTERNAL MEDICINE

## 2019-01-23 PROCEDURE — G0008 ADMIN INFLUENZA VIRUS VAC: HCPCS | Performed by: INTERNAL MEDICINE

## 2019-01-23 PROCEDURE — 6370000000 HC RX 637 (ALT 250 FOR IP): Performed by: PHYSICIAN ASSISTANT

## 2019-01-23 PROCEDURE — 94640 AIRWAY INHALATION TREATMENT: CPT

## 2019-01-23 PROCEDURE — 94761 N-INVAS EAR/PLS OXIMETRY MLT: CPT

## 2019-01-23 PROCEDURE — 90686 IIV4 VACC NO PRSV 0.5 ML IM: CPT | Performed by: INTERNAL MEDICINE

## 2019-01-23 PROCEDURE — 99254 IP/OBS CNSLTJ NEW/EST MOD 60: CPT | Performed by: PSYCHIATRY & NEUROLOGY

## 2019-01-23 PROCEDURE — 1240000000 HC EMOTIONAL WELLNESS R&B

## 2019-01-23 RX ORDER — IBUPROFEN 600 MG/1
600 TABLET ORAL EVERY 8 HOURS PRN
Status: DISCONTINUED | OUTPATIENT
Start: 2019-01-23 | End: 2019-01-23 | Stop reason: HOSPADM

## 2019-01-23 RX ORDER — LORAZEPAM 2 MG/ML
3 INJECTION INTRAMUSCULAR
Status: DISCONTINUED | OUTPATIENT
Start: 2019-01-23 | End: 2019-01-23

## 2019-01-23 RX ORDER — LORAZEPAM 2 MG/ML
1 INJECTION INTRAMUSCULAR
Status: DISCONTINUED | OUTPATIENT
Start: 2019-01-23 | End: 2019-01-25

## 2019-01-23 RX ORDER — QUETIAPINE FUMARATE 25 MG/1
50 TABLET, FILM COATED ORAL NIGHTLY
Status: DISCONTINUED | OUTPATIENT
Start: 2019-01-23 | End: 2019-01-25

## 2019-01-23 RX ORDER — ALBUTEROL SULFATE 90 UG/1
2 AEROSOL, METERED RESPIRATORY (INHALATION) EVERY 4 HOURS PRN
Status: DISCONTINUED | OUTPATIENT
Start: 2019-01-23 | End: 2019-01-23

## 2019-01-23 RX ORDER — ALBUTEROL SULFATE 90 UG/1
2 AEROSOL, METERED RESPIRATORY (INHALATION) EVERY 4 HOURS PRN
Status: DISCONTINUED | OUTPATIENT
Start: 2019-01-23 | End: 2019-01-25 | Stop reason: HOSPADM

## 2019-01-23 RX ORDER — CHLORDIAZEPOXIDE HYDROCHLORIDE 25 MG/1
25 CAPSULE, GELATIN COATED ORAL EVERY 6 HOURS
Status: DISCONTINUED | OUTPATIENT
Start: 2019-01-24 | End: 2019-01-23

## 2019-01-23 RX ORDER — FOLIC ACID 1 MG/1
1 TABLET ORAL DAILY
DISCHARGE
Start: 2019-01-24 | End: 2019-01-31

## 2019-01-23 RX ORDER — QUETIAPINE FUMARATE 100 MG/1
100 TABLET, FILM COATED ORAL NIGHTLY
Status: DISCONTINUED | OUTPATIENT
Start: 2019-01-23 | End: 2019-01-23

## 2019-01-23 RX ORDER — NICOTINE 21 MG/24HR
1 PATCH, TRANSDERMAL 24 HOURS TRANSDERMAL DAILY
Status: DISCONTINUED | OUTPATIENT
Start: 2019-01-23 | End: 2019-01-25 | Stop reason: HOSPADM

## 2019-01-23 RX ORDER — THIAMINE HYDROCHLORIDE 100 MG/ML
100 INJECTION, SOLUTION INTRAMUSCULAR; INTRAVENOUS DAILY
Status: DISCONTINUED | OUTPATIENT
Start: 2019-01-23 | End: 2019-01-23 | Stop reason: CLARIF

## 2019-01-23 RX ORDER — CHLORDIAZEPOXIDE HYDROCHLORIDE 25 MG/1
25 CAPSULE, GELATIN COATED ORAL EVERY 6 HOURS
Status: COMPLETED | OUTPATIENT
Start: 2019-01-23 | End: 2019-01-24

## 2019-01-23 RX ORDER — LORAZEPAM 2 MG/ML
4 INJECTION INTRAMUSCULAR
Status: DISCONTINUED | OUTPATIENT
Start: 2019-01-23 | End: 2019-01-25

## 2019-01-23 RX ORDER — LORAZEPAM 2 MG/ML
4 INJECTION INTRAMUSCULAR
Status: DISCONTINUED | OUTPATIENT
Start: 2019-01-23 | End: 2019-01-23

## 2019-01-23 RX ORDER — QUETIAPINE FUMARATE 25 MG/1
50 TABLET, FILM COATED ORAL NIGHTLY
Status: DISCONTINUED | OUTPATIENT
Start: 2019-01-23 | End: 2019-01-23 | Stop reason: HOSPADM

## 2019-01-23 RX ORDER — GABAPENTIN 400 MG/1
800 CAPSULE ORAL 3 TIMES DAILY
Status: DISCONTINUED | OUTPATIENT
Start: 2019-01-23 | End: 2019-01-25 | Stop reason: HOSPADM

## 2019-01-23 RX ORDER — CHLORDIAZEPOXIDE HYDROCHLORIDE 25 MG/1
25 CAPSULE, GELATIN COATED ORAL 4 TIMES DAILY
Status: SHIPPED | OUTPATIENT
Start: 2019-01-23 | End: 2019-01-26

## 2019-01-23 RX ORDER — QUETIAPINE FUMARATE 50 MG/1
50 TABLET, FILM COATED ORAL NIGHTLY
DISCHARGE
Start: 2019-01-23 | End: 2019-01-31

## 2019-01-23 RX ORDER — LORAZEPAM 2 MG/1
2 TABLET ORAL
Status: DISCONTINUED | OUTPATIENT
Start: 2019-01-23 | End: 2019-01-25

## 2019-01-23 RX ORDER — LORAZEPAM 2 MG/1
4 TABLET ORAL
Status: DISCONTINUED | OUTPATIENT
Start: 2019-01-23 | End: 2019-01-23

## 2019-01-23 RX ORDER — IBUPROFEN 400 MG/1
400 TABLET ORAL EVERY 6 HOURS PRN
Status: DISCONTINUED | OUTPATIENT
Start: 2019-01-23 | End: 2019-01-25 | Stop reason: HOSPADM

## 2019-01-23 RX ORDER — LANOLIN ALCOHOL/MO/W.PET/CERES
100 CREAM (GRAM) TOPICAL DAILY
DISCHARGE
Start: 2019-01-24 | End: 2019-01-31

## 2019-01-23 RX ORDER — NICOTINE 21 MG/24HR
1 PATCH, TRANSDERMAL 24 HOURS TRANSDERMAL DAILY
DISCHARGE
Start: 2019-01-24 | End: 2019-01-31

## 2019-01-23 RX ORDER — LORAZEPAM 2 MG/ML
1 INJECTION INTRAMUSCULAR
Status: DISCONTINUED | OUTPATIENT
Start: 2019-01-23 | End: 2019-01-23

## 2019-01-23 RX ORDER — NICOTINE 21 MG/24HR
1 PATCH, TRANSDERMAL 24 HOURS TRANSDERMAL DAILY
Status: DISCONTINUED | OUTPATIENT
Start: 2019-01-23 | End: 2019-01-23 | Stop reason: SDUPTHER

## 2019-01-23 RX ORDER — LORAZEPAM 2 MG/ML
2 INJECTION INTRAMUSCULAR
Status: DISCONTINUED | OUTPATIENT
Start: 2019-01-23 | End: 2019-01-25

## 2019-01-23 RX ORDER — LORAZEPAM 1 MG/1
1 TABLET ORAL EVERY 4 HOURS PRN
Status: SHIPPED | OUTPATIENT
Start: 2019-01-23 | End: 2019-01-28

## 2019-01-23 RX ORDER — ACETAMINOPHEN 325 MG/1
650 TABLET ORAL EVERY 4 HOURS PRN
Status: DISCONTINUED | OUTPATIENT
Start: 2019-01-23 | End: 2019-01-23 | Stop reason: HOSPADM

## 2019-01-23 RX ORDER — CHLORDIAZEPOXIDE HYDROCHLORIDE 25 MG/1
25 CAPSULE, GELATIN COATED ORAL 3 TIMES DAILY
Status: DISCONTINUED | OUTPATIENT
Start: 2019-01-23 | End: 2019-01-23

## 2019-01-23 RX ORDER — LORAZEPAM 2 MG/1
2 TABLET ORAL
Status: DISCONTINUED | OUTPATIENT
Start: 2019-01-23 | End: 2019-01-23

## 2019-01-23 RX ORDER — ACETAMINOPHEN 325 MG/1
650 TABLET ORAL EVERY 4 HOURS PRN
Status: DISCONTINUED | OUTPATIENT
Start: 2019-01-23 | End: 2019-01-25 | Stop reason: HOSPADM

## 2019-01-23 RX ORDER — LORAZEPAM 2 MG/1
4 TABLET ORAL
Status: DISCONTINUED | OUTPATIENT
Start: 2019-01-23 | End: 2019-01-25

## 2019-01-23 RX ORDER — LORAZEPAM 2 MG/ML
2 INJECTION INTRAMUSCULAR
Status: DISCONTINUED | OUTPATIENT
Start: 2019-01-23 | End: 2019-01-23

## 2019-01-23 RX ORDER — LANOLIN ALCOHOL/MO/W.PET/CERES
6 CREAM (GRAM) TOPICAL NIGHTLY
Status: DISCONTINUED | OUTPATIENT
Start: 2019-01-23 | End: 2019-01-25 | Stop reason: HOSPADM

## 2019-01-23 RX ORDER — LORAZEPAM 1 MG/1
1 TABLET ORAL
Status: DISCONTINUED | OUTPATIENT
Start: 2019-01-23 | End: 2019-01-23

## 2019-01-23 RX ORDER — LORAZEPAM 2 MG/ML
3 INJECTION INTRAMUSCULAR
Status: DISCONTINUED | OUTPATIENT
Start: 2019-01-23 | End: 2019-01-25

## 2019-01-23 RX ORDER — LORAZEPAM 1 MG/1
1 TABLET ORAL
Status: DISCONTINUED | OUTPATIENT
Start: 2019-01-23 | End: 2019-01-25

## 2019-01-23 RX ORDER — FOLIC ACID 1 MG/1
1 TABLET ORAL DAILY
Status: DISCONTINUED | OUTPATIENT
Start: 2019-01-24 | End: 2019-01-25 | Stop reason: HOSPADM

## 2019-01-23 RX ORDER — THIAMINE MONONITRATE (VIT B1) 100 MG
100 TABLET ORAL DAILY
Status: DISCONTINUED | OUTPATIENT
Start: 2019-01-24 | End: 2019-01-25 | Stop reason: HOSPADM

## 2019-01-23 RX ORDER — FOLIC ACID 1 MG/1
1 TABLET ORAL DAILY
Status: DISCONTINUED | OUTPATIENT
Start: 2019-01-23 | End: 2019-01-23

## 2019-01-23 RX ADMIN — QUETIAPINE FUMARATE 50 MG: 25 TABLET ORAL at 21:45

## 2019-01-23 RX ADMIN — CHLORDIAZEPOXIDE HYDROCHLORIDE 25 MG: 25 CAPSULE ORAL at 21:45

## 2019-01-23 RX ADMIN — LORAZEPAM 2 MG: 2 TABLET ORAL at 14:20

## 2019-01-23 RX ADMIN — INFLUENZA A VIRUS A/MICHIGAN/45/2015 X-275 (H1N1) ANTIGEN (FORMALDEHYDE INACTIVATED), INFLUENZA A VIRUS A/SINGAPORE/INFIMH-16-0019/2016 IVR-186 (H3N2) ANTIGEN (FORMALDEHYDE INACTIVATED), INFLUENZA B VIRUS B/PHUKET/3073/2013 ANTIGEN (FORMALDEHYDE INACTIVATED), AND INFLUENZA B VIRUS B/MARYLAND/15/2016 BX-69A ANTIGEN (FORMALDEHYDE INACTIVATED) 0.5 ML: 15; 15; 15; 15 INJECTION, SUSPENSION INTRAMUSCULAR at 14:09

## 2019-01-23 RX ADMIN — ENOXAPARIN SODIUM 40 MG: 40 INJECTION SUBCUTANEOUS at 09:06

## 2019-01-23 RX ADMIN — LORAZEPAM 2 MG: 2 INJECTION INTRAMUSCULAR at 06:59

## 2019-01-23 RX ADMIN — LORAZEPAM 2 MG: 2 INJECTION INTRAMUSCULAR at 05:02

## 2019-01-23 RX ADMIN — Medication 100 MG: at 09:06

## 2019-01-23 RX ADMIN — CHLORDIAZEPOXIDE HYDROCHLORIDE 25 MG: 25 CAPSULE ORAL at 02:12

## 2019-01-23 RX ADMIN — Medication 10 ML: at 09:07

## 2019-01-23 RX ADMIN — MELATONIN 3 MG ORAL TABLET 6 MG: 3 TABLET ORAL at 21:45

## 2019-01-23 RX ADMIN — GABAPENTIN 800 MG: 400 CAPSULE ORAL at 21:45

## 2019-01-23 RX ADMIN — LORAZEPAM 2 MG: 2 INJECTION INTRAMUSCULAR at 02:20

## 2019-01-23 RX ADMIN — LORAZEPAM 2 MG: 2 INJECTION INTRAMUSCULAR at 09:07

## 2019-01-23 RX ADMIN — SODIUM CHLORIDE: 9 INJECTION, SOLUTION INTRAVENOUS at 10:22

## 2019-01-23 RX ADMIN — SODIUM CHLORIDE: 9 INJECTION, SOLUTION INTRAVENOUS at 02:09

## 2019-01-23 RX ADMIN — CHLORDIAZEPOXIDE HYDROCHLORIDE 25 MG: 25 CAPSULE ORAL at 14:02

## 2019-01-23 RX ADMIN — IBUPROFEN 600 MG: 600 TABLET ORAL at 09:47

## 2019-01-23 RX ADMIN — GABAPENTIN 800 MG: 400 CAPSULE ORAL at 09:06

## 2019-01-23 RX ADMIN — Medication 2 PUFF: at 21:05

## 2019-01-23 RX ADMIN — FOLIC ACID 1 MG: 1 TABLET ORAL at 09:06

## 2019-01-23 RX ADMIN — GABAPENTIN 800 MG: 400 CAPSULE ORAL at 14:02

## 2019-01-23 RX ADMIN — CHLORDIAZEPOXIDE HYDROCHLORIDE 25 MG: 25 CAPSULE ORAL at 06:59

## 2019-01-23 ASSESSMENT — PAIN SCALES - GENERAL
PAINLEVEL_OUTOF10: 2
PAINLEVEL_OUTOF10: 9

## 2019-01-23 ASSESSMENT — SLEEP AND FATIGUE QUESTIONNAIRES
DIFFICULTY FALLING ASLEEP: YES
DIFFICULTY STAYING ASLEEP: YES
SLEEP PATTERN: DIFFICULTY FALLING ASLEEP;DISTURBED/INTERRUPTED SLEEP
DIFFICULTY ARISING: NO
AVERAGE NUMBER OF SLEEP HOURS: 3
RESTFUL SLEEP: NO
DO YOU HAVE DIFFICULTY SLEEPING: NO
DO YOU USE A SLEEP AID: YES

## 2019-01-23 ASSESSMENT — LIFESTYLE VARIABLES: HISTORY_ALCOHOL_USE: YES

## 2019-01-23 ASSESSMENT — PATIENT HEALTH QUESTIONNAIRE - PHQ9: SUM OF ALL RESPONSES TO PHQ QUESTIONS 1-9: 27

## 2019-01-24 PROBLEM — F19.21 POLYSUBSTANCE DEPENDENCE IN EARLY, EARLY PARTIAL, SUSTAINED FULL, OR SUSTAINED PARTIAL REMISSION (HCC): Chronic | Status: ACTIVE | Noted: 2019-01-24

## 2019-01-24 PROCEDURE — 6370000000 HC RX 637 (ALT 250 FOR IP): Performed by: PSYCHIATRY & NEUROLOGY

## 2019-01-24 PROCEDURE — 99223 1ST HOSP IP/OBS HIGH 75: CPT | Performed by: PSYCHIATRY & NEUROLOGY

## 2019-01-24 PROCEDURE — 94640 AIRWAY INHALATION TREATMENT: CPT

## 2019-01-24 PROCEDURE — 94761 N-INVAS EAR/PLS OXIMETRY MLT: CPT

## 2019-01-24 PROCEDURE — 1240000000 HC EMOTIONAL WELLNESS R&B

## 2019-01-24 RX ORDER — BUSPIRONE HYDROCHLORIDE 5 MG/1
10 TABLET ORAL 3 TIMES DAILY
Status: DISCONTINUED | OUTPATIENT
Start: 2019-01-24 | End: 2019-01-25 | Stop reason: DRUGHIGH

## 2019-01-24 RX ORDER — QUETIAPINE FUMARATE 25 MG/1
25 TABLET, FILM COATED ORAL EVERY 6 HOURS PRN
Status: DISCONTINUED | OUTPATIENT
Start: 2019-01-24 | End: 2019-01-25 | Stop reason: HOSPADM

## 2019-01-24 RX ORDER — CHLORDIAZEPOXIDE HYDROCHLORIDE 5 MG/1
10 CAPSULE, GELATIN COATED ORAL 3 TIMES DAILY
Status: DISCONTINUED | OUTPATIENT
Start: 2019-01-25 | End: 2019-01-25 | Stop reason: HOSPADM

## 2019-01-24 RX ORDER — ESCITALOPRAM OXALATE 10 MG/1
10 TABLET ORAL DAILY
Status: DISCONTINUED | OUTPATIENT
Start: 2019-01-24 | End: 2019-01-25 | Stop reason: HOSPADM

## 2019-01-24 RX ADMIN — Medication 2 PUFF: at 08:12

## 2019-01-24 RX ADMIN — FOLIC ACID 1 MG: 1 TABLET ORAL at 08:23

## 2019-01-24 RX ADMIN — GABAPENTIN 800 MG: 400 CAPSULE ORAL at 20:26

## 2019-01-24 RX ADMIN — MELATONIN 3 MG ORAL TABLET 6 MG: 3 TABLET ORAL at 20:26

## 2019-01-24 RX ADMIN — GABAPENTIN 800 MG: 400 CAPSULE ORAL at 08:23

## 2019-01-24 RX ADMIN — QUETIAPINE FUMARATE 50 MG: 25 TABLET ORAL at 20:26

## 2019-01-24 RX ADMIN — ESCITALOPRAM OXALATE 10 MG: 10 TABLET, FILM COATED ORAL at 13:24

## 2019-01-24 RX ADMIN — BUSPIRONE HYDROCHLORIDE 10 MG: 5 TABLET ORAL at 13:24

## 2019-01-24 RX ADMIN — BUSPIRONE HYDROCHLORIDE 10 MG: 5 TABLET ORAL at 20:26

## 2019-01-24 RX ADMIN — LORAZEPAM 2 MG: 2 TABLET ORAL at 06:30

## 2019-01-24 RX ADMIN — LORAZEPAM 2 MG: 2 TABLET ORAL at 15:52

## 2019-01-24 RX ADMIN — CHLORDIAZEPOXIDE HYDROCHLORIDE 25 MG: 25 CAPSULE ORAL at 20:26

## 2019-01-24 RX ADMIN — GABAPENTIN 800 MG: 400 CAPSULE ORAL at 13:24

## 2019-01-24 RX ADMIN — CHLORDIAZEPOXIDE HYDROCHLORIDE 25 MG: 25 CAPSULE ORAL at 03:34

## 2019-01-24 RX ADMIN — LORAZEPAM 2 MG: 2 TABLET ORAL at 10:45

## 2019-01-24 RX ADMIN — IBUPROFEN 400 MG: 400 TABLET ORAL at 10:45

## 2019-01-24 RX ADMIN — CHLORDIAZEPOXIDE HYDROCHLORIDE 25 MG: 25 CAPSULE ORAL at 15:47

## 2019-01-24 RX ADMIN — Medication 100 MG: at 08:23

## 2019-01-24 RX ADMIN — CHLORDIAZEPOXIDE HYDROCHLORIDE 25 MG: 25 CAPSULE ORAL at 08:23

## 2019-01-24 RX ADMIN — Medication 2 PUFF: at 20:44

## 2019-01-24 ASSESSMENT — SLEEP AND FATIGUE QUESTIONNAIRES
DO YOU HAVE DIFFICULTY SLEEPING: YES
DIFFICULTY STAYING ASLEEP: YES
RESTFUL SLEEP: NO
DIFFICULTY ARISING: NO
AVERAGE NUMBER OF SLEEP HOURS: 9
DO YOU USE A SLEEP AID: YES
SLEEP PATTERN: DISTURBED/INTERRUPTED SLEEP;INSOMNIA
DIFFICULTY FALLING ASLEEP: NO

## 2019-01-24 ASSESSMENT — PAIN DESCRIPTION - DESCRIPTORS: DESCRIPTORS: HEADACHE

## 2019-01-24 ASSESSMENT — PAIN DESCRIPTION - PAIN TYPE: TYPE: ACUTE PAIN

## 2019-01-24 ASSESSMENT — PAIN DESCRIPTION - LOCATION: LOCATION: HEAD

## 2019-01-24 ASSESSMENT — PATIENT HEALTH QUESTIONNAIRE - PHQ9: SUM OF ALL RESPONSES TO PHQ QUESTIONS 1-9: 27

## 2019-01-24 ASSESSMENT — PAIN SCALES - GENERAL: PAINLEVEL_OUTOF10: 8

## 2019-01-24 ASSESSMENT — LIFESTYLE VARIABLES: HISTORY_ALCOHOL_USE: YES

## 2019-01-25 VITALS
HEIGHT: 61 IN | HEART RATE: 99 BPM | RESPIRATION RATE: 16 BRPM | TEMPERATURE: 97.8 F | WEIGHT: 190 LBS | SYSTOLIC BLOOD PRESSURE: 121 MMHG | BODY MASS INDEX: 35.87 KG/M2 | OXYGEN SATURATION: 99 % | DIASTOLIC BLOOD PRESSURE: 70 MMHG

## 2019-01-25 PROCEDURE — 6370000000 HC RX 637 (ALT 250 FOR IP): Performed by: PSYCHIATRY & NEUROLOGY

## 2019-01-25 PROCEDURE — 94640 AIRWAY INHALATION TREATMENT: CPT

## 2019-01-25 PROCEDURE — 99233 SBSQ HOSP IP/OBS HIGH 50: CPT | Performed by: PSYCHIATRY & NEUROLOGY

## 2019-01-25 RX ORDER — QUETIAPINE FUMARATE 100 MG/1
100 TABLET, FILM COATED ORAL NIGHTLY
Status: DISCONTINUED | OUTPATIENT
Start: 2019-01-25 | End: 2019-01-25 | Stop reason: HOSPADM

## 2019-01-25 RX ORDER — BUSPIRONE HYDROCHLORIDE 7.5 MG/1
15 TABLET ORAL 3 TIMES DAILY
Status: DISCONTINUED | OUTPATIENT
Start: 2019-01-25 | End: 2019-01-25 | Stop reason: HOSPADM

## 2019-01-25 RX ORDER — CLONIDINE HYDROCHLORIDE 0.1 MG/1
0.1 TABLET ORAL NIGHTLY
Status: DISCONTINUED | OUTPATIENT
Start: 2019-01-25 | End: 2019-01-25 | Stop reason: HOSPADM

## 2019-01-25 RX ADMIN — GABAPENTIN 800 MG: 400 CAPSULE ORAL at 10:45

## 2019-01-25 RX ADMIN — Medication 100 MG: at 10:45

## 2019-01-25 RX ADMIN — CHLORDIAZEPOXIDE HYDROCHLORIDE 10 MG: 5 CAPSULE ORAL at 10:45

## 2019-01-25 RX ADMIN — GABAPENTIN 800 MG: 400 CAPSULE ORAL at 14:07

## 2019-01-25 RX ADMIN — ESCITALOPRAM OXALATE 10 MG: 10 TABLET, FILM COATED ORAL at 10:45

## 2019-01-25 RX ADMIN — BUSPIRONE HYDROCHLORIDE 10 MG: 5 TABLET ORAL at 10:45

## 2019-01-25 RX ADMIN — CHLORDIAZEPOXIDE HYDROCHLORIDE 10 MG: 5 CAPSULE ORAL at 14:07

## 2019-01-25 RX ADMIN — BUSPIRONE HYDROCHLORIDE 15 MG: 7.5 TABLET ORAL at 14:07

## 2019-01-25 RX ADMIN — Medication 2 PUFF: at 12:09

## 2019-01-25 RX ADMIN — FOLIC ACID 1 MG: 1 TABLET ORAL at 10:46

## 2019-01-31 ENCOUNTER — HOSPITAL ENCOUNTER (INPATIENT)
Age: 39
LOS: 4 days | Discharge: HOME | End: 2019-02-04
Attending: EMERGENCY MEDICINE | Admitting: HOSPITALIST
Payer: COMMERCIAL

## 2019-01-31 DIAGNOSIS — F10.930 ALCOHOL WITHDRAWAL SYNDROME WITHOUT COMPLICATION (HCC): Primary | ICD-10-CM

## 2019-01-31 DIAGNOSIS — F19.10 DRUG ABUSE (HCC): ICD-10-CM

## 2019-01-31 PROBLEM — R25.1 TREMORS OF NERVOUS SYSTEM: Status: ACTIVE | Noted: 2019-01-31

## 2019-01-31 LAB
A/G RATIO: 1.2 (ref 1.1–2.2)
ACETAMINOPHEN LEVEL: <5 UG/ML (ref 10–30)
ALBUMIN SERPL-MCNC: 4 G/DL (ref 3.4–5)
ALP BLD-CCNC: 80 U/L (ref 40–129)
ALT SERPL-CCNC: 12 U/L (ref 10–40)
AMPHETAMINE SCREEN, URINE: ABNORMAL
ANION GAP SERPL CALCULATED.3IONS-SCNC: 11 MMOL/L (ref 3–16)
AST SERPL-CCNC: 17 U/L (ref 15–37)
BARBITURATE SCREEN URINE: ABNORMAL
BASOPHILS ABSOLUTE: 0.1 K/UL (ref 0–0.2)
BASOPHILS RELATIVE PERCENT: 1.1 %
BENZODIAZEPINE SCREEN, URINE: POSITIVE
BILIRUB SERPL-MCNC: 0.3 MG/DL (ref 0–1)
BILIRUBIN URINE: NEGATIVE
BLOOD, URINE: NEGATIVE
BUN BLDV-MCNC: 12 MG/DL (ref 7–20)
CALCIUM SERPL-MCNC: 9.8 MG/DL (ref 8.3–10.6)
CANNABINOID SCREEN URINE: POSITIVE
CHLORIDE BLD-SCNC: 103 MMOL/L (ref 99–110)
CLARITY: ABNORMAL
CO2: 24 MMOL/L (ref 21–32)
COCAINE METABOLITE SCREEN URINE: ABNORMAL
COLOR: YELLOW
CREAT SERPL-MCNC: 1 MG/DL (ref 0.6–1.1)
EOSINOPHILS ABSOLUTE: 0.3 K/UL (ref 0–0.6)
EOSINOPHILS RELATIVE PERCENT: 3.8 %
EPITHELIAL CELLS, UA: 3 /HPF (ref 0–5)
ETHANOL: NORMAL MG/DL (ref 0–0.08)
GFR AFRICAN AMERICAN: >60
GFR NON-AFRICAN AMERICAN: >60
GLOBULIN: 3.4 G/DL
GLUCOSE BLD-MCNC: 103 MG/DL (ref 70–99)
GLUCOSE URINE: NEGATIVE MG/DL
GONADOTROPIN, CHORIONIC (HCG) QUANT: <5 MIU/ML
HCG QUALITATIVE: NEGATIVE
HCT VFR BLD CALC: 40.9 % (ref 36–48)
HEMOGLOBIN: 13.5 G/DL (ref 12–16)
HYALINE CASTS: 1 /LPF (ref 0–8)
KETONES, URINE: NEGATIVE MG/DL
LEUKOCYTE ESTERASE, URINE: ABNORMAL
LYMPHOCYTES ABSOLUTE: 2.1 K/UL (ref 1–5.1)
LYMPHOCYTES RELATIVE PERCENT: 27.8 %
Lab: ABNORMAL
MCH RBC QN AUTO: 33 PG (ref 26–34)
MCHC RBC AUTO-ENTMCNC: 33.1 G/DL (ref 31–36)
MCV RBC AUTO: 99.7 FL (ref 80–100)
METHADONE SCREEN, URINE: ABNORMAL
MICROSCOPIC EXAMINATION: YES
MONOCYTES ABSOLUTE: 0.3 K/UL (ref 0–1.3)
MONOCYTES RELATIVE PERCENT: 4.4 %
NEUTROPHILS ABSOLUTE: 4.7 K/UL (ref 1.7–7.7)
NEUTROPHILS RELATIVE PERCENT: 62.9 %
NITRITE, URINE: NEGATIVE
OPIATE SCREEN URINE: ABNORMAL
OXYCODONE URINE: ABNORMAL
PDW BLD-RTO: 13.1 % (ref 12.4–15.4)
PH UA: 6
PH UA: 6
PHENCYCLIDINE SCREEN URINE: ABNORMAL
PLATELET # BLD: 238 K/UL (ref 135–450)
PLATELET SLIDE REVIEW: ADEQUATE
PMV BLD AUTO: 9.4 FL (ref 5–10.5)
POTASSIUM SERPL-SCNC: 4.1 MMOL/L (ref 3.5–5.1)
PROPOXYPHENE SCREEN: ABNORMAL
PROTEIN UA: NEGATIVE MG/DL
RBC # BLD: 4.1 M/UL (ref 4–5.2)
RBC UA: 1 /HPF (ref 0–4)
SALICYLATE, SERUM: <0.3 MG/DL (ref 15–30)
SODIUM BLD-SCNC: 138 MMOL/L (ref 136–145)
SPECIFIC GRAVITY UA: 1.01
TOTAL PROTEIN: 7.4 G/DL (ref 6.4–8.2)
TSH REFLEX: 1.54 UIU/ML (ref 0.27–4.2)
URINE REFLEX TO CULTURE: YES
URINE TYPE: ABNORMAL
UROBILINOGEN, URINE: 0.2 E.U./DL
WBC # BLD: 7.5 K/UL (ref 4–11)
WBC UA: 9 /HPF (ref 0–5)

## 2019-01-31 PROCEDURE — 94640 AIRWAY INHALATION TREATMENT: CPT

## 2019-01-31 PROCEDURE — G0480 DRUG TEST DEF 1-7 CLASSES: HCPCS

## 2019-01-31 PROCEDURE — 2580000003 HC RX 258: Performed by: EMERGENCY MEDICINE

## 2019-01-31 PROCEDURE — 2580000003 HC RX 258: Performed by: HOSPITALIST

## 2019-01-31 PROCEDURE — 6370000000 HC RX 637 (ALT 250 FOR IP): Performed by: HOSPITALIST

## 2019-01-31 PROCEDURE — 6370000000 HC RX 637 (ALT 250 FOR IP): Performed by: EMERGENCY MEDICINE

## 2019-01-31 PROCEDURE — 6360000002 HC RX W HCPCS: Performed by: EMERGENCY MEDICINE

## 2019-01-31 PROCEDURE — 6360000002 HC RX W HCPCS

## 2019-01-31 PROCEDURE — 80053 COMPREHEN METABOLIC PANEL: CPT

## 2019-01-31 PROCEDURE — 84703 CHORIONIC GONADOTROPIN ASSAY: CPT

## 2019-01-31 PROCEDURE — 84443 ASSAY THYROID STIM HORMONE: CPT

## 2019-01-31 PROCEDURE — 87086 URINE CULTURE/COLONY COUNT: CPT

## 2019-01-31 PROCEDURE — 96374 THER/PROPH/DIAG INJ IV PUSH: CPT

## 2019-01-31 PROCEDURE — 2500000003 HC RX 250 WO HCPCS: Performed by: HOSPITALIST

## 2019-01-31 PROCEDURE — 84702 CHORIONIC GONADOTROPIN TEST: CPT

## 2019-01-31 PROCEDURE — 80307 DRUG TEST PRSMV CHEM ANLYZR: CPT

## 2019-01-31 PROCEDURE — 6370000000 HC RX 637 (ALT 250 FOR IP): Performed by: PSYCHIATRY & NEUROLOGY

## 2019-01-31 PROCEDURE — 6360000002 HC RX W HCPCS: Performed by: HOSPITALIST

## 2019-01-31 PROCEDURE — 85025 COMPLETE CBC W/AUTO DIFF WBC: CPT

## 2019-01-31 PROCEDURE — 81001 URINALYSIS AUTO W/SCOPE: CPT

## 2019-01-31 PROCEDURE — 1200000000 HC SEMI PRIVATE

## 2019-01-31 PROCEDURE — 99285 EMERGENCY DEPT VISIT HI MDM: CPT

## 2019-01-31 PROCEDURE — 2500000003 HC RX 250 WO HCPCS: Performed by: EMERGENCY MEDICINE

## 2019-01-31 PROCEDURE — 94760 N-INVAS EAR/PLS OXIMETRY 1: CPT

## 2019-01-31 RX ORDER — ONDANSETRON 2 MG/ML
4 INJECTION INTRAMUSCULAR; INTRAVENOUS EVERY 6 HOURS PRN
Status: DISCONTINUED | OUTPATIENT
Start: 2019-01-31 | End: 2019-02-04 | Stop reason: HOSPADM

## 2019-01-31 RX ORDER — ONDANSETRON 2 MG/ML
INJECTION INTRAMUSCULAR; INTRAVENOUS
Status: COMPLETED
Start: 2019-01-31 | End: 2019-01-31

## 2019-01-31 RX ORDER — TRAZODONE HYDROCHLORIDE 50 MG/1
50 TABLET ORAL NIGHTLY
Status: DISCONTINUED | OUTPATIENT
Start: 2019-01-31 | End: 2019-02-02

## 2019-01-31 RX ORDER — CHLORDIAZEPOXIDE HYDROCHLORIDE 5 MG/1
10 CAPSULE, GELATIN COATED ORAL 3 TIMES DAILY
Status: DISCONTINUED | OUTPATIENT
Start: 2019-01-31 | End: 2019-02-01

## 2019-01-31 RX ORDER — ALBUTEROL SULFATE 90 UG/1
2 AEROSOL, METERED RESPIRATORY (INHALATION) EVERY 4 HOURS PRN
Status: DISCONTINUED | OUTPATIENT
Start: 2019-01-31 | End: 2019-02-04 | Stop reason: HOSPADM

## 2019-01-31 RX ORDER — ALBUTEROL SULFATE 90 UG/1
2 AEROSOL, METERED RESPIRATORY (INHALATION) 2 TIMES DAILY
Status: DISCONTINUED | OUTPATIENT
Start: 2019-01-31 | End: 2019-02-04 | Stop reason: HOSPADM

## 2019-01-31 RX ORDER — TEMAZEPAM 15 MG/1
30 CAPSULE ORAL NIGHTLY PRN
Status: ON HOLD | COMMUNITY
End: 2019-02-04 | Stop reason: HOSPADM

## 2019-01-31 RX ORDER — QUETIAPINE FUMARATE 25 MG/1
50 TABLET, FILM COATED ORAL NIGHTLY
Status: DISCONTINUED | OUTPATIENT
Start: 2019-01-31 | End: 2019-02-01

## 2019-01-31 RX ORDER — LORAZEPAM 2 MG/ML
3 INJECTION INTRAMUSCULAR
Status: DISCONTINUED | OUTPATIENT
Start: 2019-01-31 | End: 2019-02-02

## 2019-01-31 RX ORDER — LORAZEPAM 1 MG/1
3 TABLET ORAL
Status: DISCONTINUED | OUTPATIENT
Start: 2019-01-31 | End: 2019-02-02

## 2019-01-31 RX ORDER — NICOTINE 21 MG/24HR
1 PATCH, TRANSDERMAL 24 HOURS TRANSDERMAL DAILY
Status: DISCONTINUED | OUTPATIENT
Start: 2019-01-31 | End: 2019-02-04 | Stop reason: HOSPADM

## 2019-01-31 RX ORDER — SODIUM CHLORIDE 0.9 % (FLUSH) 0.9 %
10 SYRINGE (ML) INJECTION EVERY 12 HOURS SCHEDULED
Status: DISCONTINUED | OUTPATIENT
Start: 2019-01-31 | End: 2019-02-04 | Stop reason: HOSPADM

## 2019-01-31 RX ORDER — GABAPENTIN 400 MG/1
800 CAPSULE ORAL 3 TIMES DAILY
Status: DISCONTINUED | OUTPATIENT
Start: 2019-01-31 | End: 2019-02-04 | Stop reason: HOSPADM

## 2019-01-31 RX ORDER — SODIUM CHLORIDE 0.9 % (FLUSH) 0.9 %
10 SYRINGE (ML) INJECTION PRN
Status: DISCONTINUED | OUTPATIENT
Start: 2019-01-31 | End: 2019-02-04 | Stop reason: HOSPADM

## 2019-01-31 RX ORDER — LORAZEPAM 2 MG/ML
1 INJECTION INTRAMUSCULAR
Status: DISCONTINUED | OUTPATIENT
Start: 2019-01-31 | End: 2019-02-03

## 2019-01-31 RX ORDER — LORAZEPAM 1 MG/1
4 TABLET ORAL
Status: DISCONTINUED | OUTPATIENT
Start: 2019-01-31 | End: 2019-02-02

## 2019-01-31 RX ORDER — ACETAMINOPHEN 500 MG
1000 TABLET ORAL EVERY 6 HOURS PRN
Status: DISCONTINUED | OUTPATIENT
Start: 2019-01-31 | End: 2019-02-04 | Stop reason: HOSPADM

## 2019-01-31 RX ORDER — LORAZEPAM 2 MG/ML
4 INJECTION INTRAMUSCULAR
Status: DISCONTINUED | OUTPATIENT
Start: 2019-01-31 | End: 2019-02-02

## 2019-01-31 RX ORDER — DEXTROSE, SODIUM CHLORIDE, AND POTASSIUM CHLORIDE 5; .9; .15 G/100ML; G/100ML; G/100ML
INJECTION INTRAVENOUS CONTINUOUS
Status: DISCONTINUED | OUTPATIENT
Start: 2019-01-31 | End: 2019-02-02

## 2019-01-31 RX ORDER — LORAZEPAM 2 MG/ML
2 INJECTION INTRAMUSCULAR
Status: DISCONTINUED | OUTPATIENT
Start: 2019-01-31 | End: 2019-02-02

## 2019-01-31 RX ORDER — THIAMINE MONONITRATE (VIT B1) 100 MG
100 TABLET ORAL DAILY
Status: DISCONTINUED | OUTPATIENT
Start: 2019-01-31 | End: 2019-02-01 | Stop reason: SDUPTHER

## 2019-01-31 RX ORDER — THIAMINE HYDROCHLORIDE 100 MG/ML
100 INJECTION, SOLUTION INTRAMUSCULAR; INTRAVENOUS DAILY
Status: DISCONTINUED | OUTPATIENT
Start: 2019-01-31 | End: 2019-01-31 | Stop reason: ALTCHOICE

## 2019-01-31 RX ORDER — LORAZEPAM 1 MG/1
1 TABLET ORAL
Status: DISCONTINUED | OUTPATIENT
Start: 2019-01-31 | End: 2019-02-03

## 2019-01-31 RX ORDER — ALBUTEROL SULFATE 90 UG/1
2 AEROSOL, METERED RESPIRATORY (INHALATION) 4 TIMES DAILY
Status: DISCONTINUED | OUTPATIENT
Start: 2019-01-31 | End: 2019-01-31

## 2019-01-31 RX ORDER — LORAZEPAM 1 MG/1
2 TABLET ORAL
Status: DISCONTINUED | OUTPATIENT
Start: 2019-01-31 | End: 2019-02-02

## 2019-01-31 RX ADMIN — THIAMINE HYDROCHLORIDE: 100 INJECTION, SOLUTION INTRAMUSCULAR; INTRAVENOUS at 09:48

## 2019-01-31 RX ADMIN — Medication 2 PUFF: at 16:15

## 2019-01-31 RX ADMIN — ONDANSETRON 4 MG: 2 INJECTION INTRAMUSCULAR; INTRAVENOUS at 11:57

## 2019-01-31 RX ADMIN — CHLORDIAZEPOXIDE HYDROCHLORIDE 10 MG: 5 CAPSULE ORAL at 20:43

## 2019-01-31 RX ADMIN — QUETIAPINE FUMARATE 50 MG: 25 TABLET ORAL at 20:43

## 2019-01-31 RX ADMIN — LORAZEPAM 2 MG: 2 INJECTION INTRAMUSCULAR; INTRAVENOUS at 20:45

## 2019-01-31 RX ADMIN — Medication 10 ML: at 22:00

## 2019-01-31 RX ADMIN — GABAPENTIN 800 MG: 400 CAPSULE ORAL at 20:44

## 2019-01-31 RX ADMIN — CEFTRIAXONE SODIUM 1 G: 10 INJECTION, POWDER, FOR SOLUTION INTRAVENOUS at 16:22

## 2019-01-31 RX ADMIN — ACETAMINOPHEN 1000 MG: 500 TABLET, FILM COATED ORAL at 19:43

## 2019-01-31 RX ADMIN — CHLORDIAZEPOXIDE HYDROCHLORIDE 10 MG: 5 CAPSULE ORAL at 16:22

## 2019-01-31 RX ADMIN — THIAMINE HYDROCHLORIDE 100 MG: 100 INJECTION, SOLUTION INTRAMUSCULAR; INTRAVENOUS at 16:27

## 2019-01-31 RX ADMIN — LORAZEPAM 1 MG: 2 INJECTION, SOLUTION INTRAMUSCULAR; INTRAVENOUS at 22:39

## 2019-01-31 RX ADMIN — LORAZEPAM 4 MG: 2 INJECTION INTRAMUSCULAR; INTRAVENOUS at 14:13

## 2019-01-31 RX ADMIN — GABAPENTIN 800 MG: 400 CAPSULE ORAL at 16:22

## 2019-01-31 RX ADMIN — Medication 2 PUFF: at 21:41

## 2019-01-31 RX ADMIN — ENOXAPARIN SODIUM 40 MG: 40 INJECTION SUBCUTANEOUS at 16:23

## 2019-01-31 RX ADMIN — TRAZODONE HYDROCHLORIDE 50 MG: 50 TABLET ORAL at 20:46

## 2019-01-31 RX ADMIN — LORAZEPAM 4 MG: 2 INJECTION INTRAMUSCULAR; INTRAVENOUS at 12:53

## 2019-01-31 RX ADMIN — POTASSIUM CHLORIDE, DEXTROSE MONOHYDRATE AND SODIUM CHLORIDE: 150; 5; 900 INJECTION, SOLUTION INTRAVENOUS at 20:45

## 2019-01-31 RX ADMIN — LORAZEPAM 1 MG: 1 TABLET ORAL at 09:55

## 2019-01-31 ASSESSMENT — PAIN SCALES - GENERAL
PAINLEVEL_OUTOF10: 7
PAINLEVEL_OUTOF10: 5
PAINLEVEL_OUTOF10: 0
PAINLEVEL_OUTOF10: 0

## 2019-02-01 LAB
ANION GAP SERPL CALCULATED.3IONS-SCNC: 7 MMOL/L (ref 3–16)
BASOPHILS ABSOLUTE: 0 K/UL (ref 0–0.2)
BASOPHILS RELATIVE PERCENT: 0.8 %
BUN BLDV-MCNC: 17 MG/DL (ref 7–20)
CALCIUM SERPL-MCNC: 9.2 MG/DL (ref 8.3–10.6)
CHLORIDE BLD-SCNC: 108 MMOL/L (ref 99–110)
CO2: 28 MMOL/L (ref 21–32)
CREAT SERPL-MCNC: 1 MG/DL (ref 0.6–1.1)
EOSINOPHILS ABSOLUTE: 0.3 K/UL (ref 0–0.6)
EOSINOPHILS RELATIVE PERCENT: 6.1 %
GFR AFRICAN AMERICAN: >60
GFR NON-AFRICAN AMERICAN: >60
GLUCOSE BLD-MCNC: 108 MG/DL (ref 70–99)
HCT VFR BLD CALC: 36.5 % (ref 36–48)
HEMOGLOBIN: 12.2 G/DL (ref 12–16)
LYMPHOCYTES ABSOLUTE: 2.1 K/UL (ref 1–5.1)
LYMPHOCYTES RELATIVE PERCENT: 46.7 %
MCH RBC QN AUTO: 33.5 PG (ref 26–34)
MCHC RBC AUTO-ENTMCNC: 33.5 G/DL (ref 31–36)
MCV RBC AUTO: 99.8 FL (ref 80–100)
MONOCYTES ABSOLUTE: 0.3 K/UL (ref 0–1.3)
MONOCYTES RELATIVE PERCENT: 7.1 %
NEUTROPHILS ABSOLUTE: 1.7 K/UL (ref 1.7–7.7)
NEUTROPHILS RELATIVE PERCENT: 39.3 %
PDW BLD-RTO: 13.7 % (ref 12.4–15.4)
PLATELET # BLD: 206 K/UL (ref 135–450)
PMV BLD AUTO: 9 FL (ref 5–10.5)
POTASSIUM REFLEX MAGNESIUM: 4.1 MMOL/L (ref 3.5–5.1)
RBC # BLD: 3.65 M/UL (ref 4–5.2)
SODIUM BLD-SCNC: 143 MMOL/L (ref 136–145)
URINE CULTURE, ROUTINE: NORMAL
WBC # BLD: 4.4 K/UL (ref 4–11)

## 2019-02-01 PROCEDURE — 80048 BASIC METABOLIC PNL TOTAL CA: CPT

## 2019-02-01 PROCEDURE — 6360000002 HC RX W HCPCS: Performed by: EMERGENCY MEDICINE

## 2019-02-01 PROCEDURE — 6360000002 HC RX W HCPCS: Performed by: HOSPITALIST

## 2019-02-01 PROCEDURE — 6370000000 HC RX 637 (ALT 250 FOR IP): Performed by: PSYCHIATRY & NEUROLOGY

## 2019-02-01 PROCEDURE — 85025 COMPLETE CBC W/AUTO DIFF WBC: CPT

## 2019-02-01 PROCEDURE — 2060000000 HC ICU INTERMEDIATE R&B

## 2019-02-01 PROCEDURE — 6370000000 HC RX 637 (ALT 250 FOR IP): Performed by: HOSPITALIST

## 2019-02-01 PROCEDURE — 2500000003 HC RX 250 WO HCPCS: Performed by: HOSPITALIST

## 2019-02-01 PROCEDURE — 94640 AIRWAY INHALATION TREATMENT: CPT

## 2019-02-01 PROCEDURE — 36415 COLL VENOUS BLD VENIPUNCTURE: CPT

## 2019-02-01 PROCEDURE — 2580000003 HC RX 258: Performed by: HOSPITALIST

## 2019-02-01 PROCEDURE — 6370000000 HC RX 637 (ALT 250 FOR IP): Performed by: EMERGENCY MEDICINE

## 2019-02-01 RX ORDER — CHLORDIAZEPOXIDE HYDROCHLORIDE 5 MG/1
5 CAPSULE, GELATIN COATED ORAL 3 TIMES DAILY
Status: DISCONTINUED | OUTPATIENT
Start: 2019-02-01 | End: 2019-02-02

## 2019-02-01 RX ORDER — QUETIAPINE FUMARATE 100 MG/1
100 TABLET, FILM COATED ORAL NIGHTLY
Status: DISCONTINUED | OUTPATIENT
Start: 2019-02-01 | End: 2019-02-02

## 2019-02-01 RX ORDER — THIAMINE HCL 100 MG
100 TABLET ORAL DAILY
Status: DISCONTINUED | OUTPATIENT
Start: 2019-02-01 | End: 2019-02-04 | Stop reason: HOSPADM

## 2019-02-01 RX ORDER — THIAMINE MONONITRATE (VIT B1) 100 MG
100 TABLET ORAL DAILY
Status: DISCONTINUED | OUTPATIENT
Start: 2019-02-01 | End: 2019-02-01 | Stop reason: SDUPTHER

## 2019-02-01 RX ORDER — THIAMINE HCL 100 MG
100 TABLET ORAL DAILY
Status: DISCONTINUED | OUTPATIENT
Start: 2019-02-01 | End: 2019-02-01 | Stop reason: SDUPTHER

## 2019-02-01 RX ADMIN — LORAZEPAM 2 MG: 2 INJECTION INTRAMUSCULAR; INTRAVENOUS at 04:36

## 2019-02-01 RX ADMIN — CEFTRIAXONE SODIUM 1 G: 10 INJECTION, POWDER, FOR SOLUTION INTRAVENOUS at 15:39

## 2019-02-01 RX ADMIN — LORAZEPAM 4 MG: 2 INJECTION INTRAMUSCULAR; INTRAVENOUS at 10:24

## 2019-02-01 RX ADMIN — QUETIAPINE FUMARATE 100 MG: 100 TABLET ORAL at 20:12

## 2019-02-01 RX ADMIN — POTASSIUM CHLORIDE, DEXTROSE MONOHYDRATE AND SODIUM CHLORIDE: 150; 5; 900 INJECTION, SOLUTION INTRAVENOUS at 18:25

## 2019-02-01 RX ADMIN — GABAPENTIN 800 MG: 400 CAPSULE ORAL at 08:25

## 2019-02-01 RX ADMIN — GABAPENTIN 800 MG: 400 CAPSULE ORAL at 15:40

## 2019-02-01 RX ADMIN — CHLORDIAZEPOXIDE HYDROCHLORIDE 5 MG: 5 CAPSULE ORAL at 20:12

## 2019-02-01 RX ADMIN — CHLORDIAZEPOXIDE HYDROCHLORIDE 5 MG: 5 CAPSULE ORAL at 15:39

## 2019-02-01 RX ADMIN — Medication 2 PUFF: at 08:00

## 2019-02-01 RX ADMIN — Medication 10 ML: at 10:13

## 2019-02-01 RX ADMIN — Medication 10 ML: at 20:12

## 2019-02-01 RX ADMIN — POTASSIUM CHLORIDE, DEXTROSE MONOHYDRATE AND SODIUM CHLORIDE: 150; 5; 900 INJECTION, SOLUTION INTRAVENOUS at 07:31

## 2019-02-01 RX ADMIN — ACETAMINOPHEN 1000 MG: 500 TABLET, FILM COATED ORAL at 12:15

## 2019-02-01 RX ADMIN — GABAPENTIN 800 MG: 400 CAPSULE ORAL at 20:11

## 2019-02-01 RX ADMIN — ACETAMINOPHEN 1000 MG: 500 TABLET, FILM COATED ORAL at 04:36

## 2019-02-01 RX ADMIN — Medication 100 MG: at 18:24

## 2019-02-01 RX ADMIN — TRAZODONE HYDROCHLORIDE 50 MG: 50 TABLET ORAL at 20:11

## 2019-02-01 RX ADMIN — LORAZEPAM 3 MG: 2 INJECTION, SOLUTION INTRAMUSCULAR; INTRAVENOUS at 20:25

## 2019-02-01 RX ADMIN — LORAZEPAM 2 MG: 2 INJECTION INTRAMUSCULAR; INTRAVENOUS at 12:16

## 2019-02-01 RX ADMIN — LORAZEPAM 2 MG: 2 INJECTION INTRAMUSCULAR; INTRAVENOUS at 22:40

## 2019-02-01 RX ADMIN — CHLORDIAZEPOXIDE HYDROCHLORIDE 10 MG: 5 CAPSULE ORAL at 08:25

## 2019-02-01 RX ADMIN — ENOXAPARIN SODIUM 40 MG: 40 INJECTION SUBCUTANEOUS at 08:25

## 2019-02-01 RX ADMIN — LORAZEPAM 2 MG: 2 INJECTION INTRAMUSCULAR; INTRAVENOUS at 08:26

## 2019-02-01 RX ADMIN — LORAZEPAM 3 MG: 2 INJECTION, SOLUTION INTRAMUSCULAR; INTRAVENOUS at 17:27

## 2019-02-01 ASSESSMENT — PAIN SCALES - GENERAL
PAINLEVEL_OUTOF10: 6
PAINLEVEL_OUTOF10: 0
PAINLEVEL_OUTOF10: 6
PAINLEVEL_OUTOF10: 9
PAINLEVEL_OUTOF10: 3
PAINLEVEL_OUTOF10: 0
PAINLEVEL_OUTOF10: 0
PAINLEVEL_OUTOF10: 6

## 2019-02-01 ASSESSMENT — PAIN DESCRIPTION - PAIN TYPE: TYPE: ACUTE PAIN

## 2019-02-01 ASSESSMENT — PAIN DESCRIPTION - LOCATION: LOCATION: GENERALIZED;HEAD

## 2019-02-01 ASSESSMENT — PAIN DESCRIPTION - ONSET: ONSET: ON-GOING

## 2019-02-01 ASSESSMENT — PAIN DESCRIPTION - DESCRIPTORS: DESCRIPTORS: ACHING

## 2019-02-01 ASSESSMENT — PAIN DESCRIPTION - FREQUENCY: FREQUENCY: CONTINUOUS

## 2019-02-02 LAB
ANION GAP SERPL CALCULATED.3IONS-SCNC: 7 MMOL/L (ref 3–16)
BASOPHILS ABSOLUTE: 0.1 K/UL (ref 0–0.2)
BASOPHILS RELATIVE PERCENT: 1 %
BUN BLDV-MCNC: 8 MG/DL (ref 7–20)
CALCIUM SERPL-MCNC: 9.1 MG/DL (ref 8.3–10.6)
CHLORIDE BLD-SCNC: 109 MMOL/L (ref 99–110)
CO2: 25 MMOL/L (ref 21–32)
CREAT SERPL-MCNC: 0.9 MG/DL (ref 0.6–1.1)
EOSINOPHILS ABSOLUTE: 0.3 K/UL (ref 0–0.6)
EOSINOPHILS RELATIVE PERCENT: 5.4 %
GFR AFRICAN AMERICAN: >60
GFR NON-AFRICAN AMERICAN: >60
GLUCOSE BLD-MCNC: 82 MG/DL (ref 70–99)
HCT VFR BLD CALC: 39.6 % (ref 36–48)
HEMOGLOBIN: 12.9 G/DL (ref 12–16)
LYMPHOCYTES ABSOLUTE: 2.4 K/UL (ref 1–5.1)
LYMPHOCYTES RELATIVE PERCENT: 42.9 %
MCH RBC QN AUTO: 33.6 PG (ref 26–34)
MCHC RBC AUTO-ENTMCNC: 32.7 G/DL (ref 31–36)
MCV RBC AUTO: 102.8 FL (ref 80–100)
MONOCYTES ABSOLUTE: 0.4 K/UL (ref 0–1.3)
MONOCYTES RELATIVE PERCENT: 6.4 %
NEUTROPHILS ABSOLUTE: 2.5 K/UL (ref 1.7–7.7)
NEUTROPHILS RELATIVE PERCENT: 44.3 %
PDW BLD-RTO: 13.7 % (ref 12.4–15.4)
PLATELET # BLD: 197 K/UL (ref 135–450)
PMV BLD AUTO: 9.3 FL (ref 5–10.5)
POTASSIUM REFLEX MAGNESIUM: 4.3 MMOL/L (ref 3.5–5.1)
RBC # BLD: 3.85 M/UL (ref 4–5.2)
SODIUM BLD-SCNC: 141 MMOL/L (ref 136–145)
WBC # BLD: 5.6 K/UL (ref 4–11)

## 2019-02-02 PROCEDURE — 36415 COLL VENOUS BLD VENIPUNCTURE: CPT

## 2019-02-02 PROCEDURE — 2500000003 HC RX 250 WO HCPCS: Performed by: HOSPITALIST

## 2019-02-02 PROCEDURE — 80048 BASIC METABOLIC PNL TOTAL CA: CPT

## 2019-02-02 PROCEDURE — 85025 COMPLETE CBC W/AUTO DIFF WBC: CPT

## 2019-02-02 PROCEDURE — 6370000000 HC RX 637 (ALT 250 FOR IP): Performed by: PSYCHIATRY & NEUROLOGY

## 2019-02-02 PROCEDURE — 6370000000 HC RX 637 (ALT 250 FOR IP): Performed by: EMERGENCY MEDICINE

## 2019-02-02 PROCEDURE — 6360000002 HC RX W HCPCS: Performed by: EMERGENCY MEDICINE

## 2019-02-02 PROCEDURE — 6360000002 HC RX W HCPCS: Performed by: HOSPITALIST

## 2019-02-02 PROCEDURE — 2060000000 HC ICU INTERMEDIATE R&B

## 2019-02-02 PROCEDURE — 94640 AIRWAY INHALATION TREATMENT: CPT

## 2019-02-02 PROCEDURE — 6370000000 HC RX 637 (ALT 250 FOR IP): Performed by: HOSPITALIST

## 2019-02-02 PROCEDURE — 2580000003 HC RX 258: Performed by: HOSPITALIST

## 2019-02-02 RX ORDER — TRAMADOL HYDROCHLORIDE 50 MG/1
50 TABLET ORAL EVERY 6 HOURS PRN
Status: DISCONTINUED | OUTPATIENT
Start: 2019-02-02 | End: 2019-02-04 | Stop reason: HOSPADM

## 2019-02-02 RX ORDER — CLONIDINE HYDROCHLORIDE 0.1 MG/1
0.1 TABLET ORAL EVERY 4 HOURS PRN
Status: DISCONTINUED | OUTPATIENT
Start: 2019-02-02 | End: 2019-02-03

## 2019-02-02 RX ADMIN — CEFTRIAXONE SODIUM 1 G: 10 INJECTION, POWDER, FOR SOLUTION INTRAVENOUS at 13:38

## 2019-02-02 RX ADMIN — POTASSIUM CHLORIDE, DEXTROSE MONOHYDRATE AND SODIUM CHLORIDE: 150; 5; 900 INJECTION, SOLUTION INTRAVENOUS at 05:18

## 2019-02-02 RX ADMIN — GABAPENTIN 800 MG: 400 CAPSULE ORAL at 13:37

## 2019-02-02 RX ADMIN — LORAZEPAM 1 MG: 1 TABLET ORAL at 10:47

## 2019-02-02 RX ADMIN — LORAZEPAM 2 MG: 1 TABLET ORAL at 09:20

## 2019-02-02 RX ADMIN — Medication 100 MG: at 09:20

## 2019-02-02 RX ADMIN — LORAZEPAM 1 MG: 1 TABLET ORAL at 17:13

## 2019-02-02 RX ADMIN — ENOXAPARIN SODIUM 40 MG: 40 INJECTION SUBCUTANEOUS at 09:19

## 2019-02-02 RX ADMIN — CLONIDINE HYDROCHLORIDE 0.1 MG: 0.1 TABLET ORAL at 13:37

## 2019-02-02 RX ADMIN — GABAPENTIN 800 MG: 400 CAPSULE ORAL at 09:20

## 2019-02-02 RX ADMIN — LORAZEPAM 1 MG: 1 TABLET ORAL at 20:58

## 2019-02-02 RX ADMIN — LORAZEPAM 2 MG: 2 INJECTION INTRAMUSCULAR; INTRAVENOUS at 05:11

## 2019-02-02 RX ADMIN — Medication 10 ML: at 20:47

## 2019-02-02 RX ADMIN — QUETIAPINE FUMARATE 150 MG: 100 TABLET ORAL at 20:46

## 2019-02-02 RX ADMIN — Medication 10 ML: at 05:11

## 2019-02-02 RX ADMIN — LORAZEPAM 1 MG: 1 TABLET ORAL at 13:37

## 2019-02-02 RX ADMIN — Medication 2 PUFF: at 09:05

## 2019-02-02 RX ADMIN — Medication 10 ML: at 09:21

## 2019-02-02 RX ADMIN — Medication 10 ML: at 07:03

## 2019-02-02 RX ADMIN — LORAZEPAM 2 MG: 2 INJECTION INTRAMUSCULAR; INTRAVENOUS at 07:03

## 2019-02-02 RX ADMIN — LORAZEPAM 1 MG: 1 TABLET ORAL at 19:43

## 2019-02-02 RX ADMIN — TRAMADOL HYDROCHLORIDE 50 MG: 50 TABLET, FILM COATED ORAL at 16:23

## 2019-02-02 RX ADMIN — GABAPENTIN 800 MG: 400 CAPSULE ORAL at 20:46

## 2019-02-02 RX ADMIN — CHLORDIAZEPOXIDE HYDROCHLORIDE 5 MG: 5 CAPSULE ORAL at 09:20

## 2019-02-02 ASSESSMENT — PAIN SCALES - GENERAL
PAINLEVEL_OUTOF10: 0
PAINLEVEL_OUTOF10: 7
PAINLEVEL_OUTOF10: 0

## 2019-02-03 PROCEDURE — 94640 AIRWAY INHALATION TREATMENT: CPT

## 2019-02-03 PROCEDURE — 6370000000 HC RX 637 (ALT 250 FOR IP): Performed by: HOSPITALIST

## 2019-02-03 PROCEDURE — 2060000000 HC ICU INTERMEDIATE R&B

## 2019-02-03 PROCEDURE — 6370000000 HC RX 637 (ALT 250 FOR IP): Performed by: EMERGENCY MEDICINE

## 2019-02-03 PROCEDURE — 2580000003 HC RX 258: Performed by: HOSPITALIST

## 2019-02-03 PROCEDURE — 94760 N-INVAS EAR/PLS OXIMETRY 1: CPT

## 2019-02-03 PROCEDURE — 6370000000 HC RX 637 (ALT 250 FOR IP): Performed by: PSYCHIATRY & NEUROLOGY

## 2019-02-03 PROCEDURE — 6360000002 HC RX W HCPCS: Performed by: HOSPITALIST

## 2019-02-03 RX ORDER — HYDROXYZINE HYDROCHLORIDE 10 MG/1
25 TABLET, FILM COATED ORAL EVERY 6 HOURS PRN
Status: DISCONTINUED | OUTPATIENT
Start: 2019-02-03 | End: 2019-02-04 | Stop reason: HOSPADM

## 2019-02-03 RX ORDER — ACETAMINOPHEN 80 MG
TABLET,CHEWABLE ORAL
Status: COMPLETED
Start: 2019-02-03 | End: 2019-02-03

## 2019-02-03 RX ORDER — CLONIDINE HYDROCHLORIDE 0.1 MG/1
0.1 TABLET ORAL EVERY 8 HOURS PRN
Status: DISCONTINUED | OUTPATIENT
Start: 2019-02-03 | End: 2019-02-04 | Stop reason: HOSPADM

## 2019-02-03 RX ADMIN — Medication 100 MG: at 07:53

## 2019-02-03 RX ADMIN — LORAZEPAM 1 MG: 1 TABLET ORAL at 02:13

## 2019-02-03 RX ADMIN — TRAMADOL HYDROCHLORIDE 50 MG: 50 TABLET, FILM COATED ORAL at 14:28

## 2019-02-03 RX ADMIN — GABAPENTIN 800 MG: 400 CAPSULE ORAL at 07:53

## 2019-02-03 RX ADMIN — Medication 2 PUFF: at 20:58

## 2019-02-03 RX ADMIN — LORAZEPAM 1 MG: 1 TABLET ORAL at 07:53

## 2019-02-03 RX ADMIN — Medication 10 ML: at 07:55

## 2019-02-03 RX ADMIN — QUETIAPINE FUMARATE 150 MG: 100 TABLET ORAL at 21:01

## 2019-02-03 RX ADMIN — Medication 2 PUFF: at 07:45

## 2019-02-03 RX ADMIN — CLONIDINE HYDROCHLORIDE 0.1 MG: 0.1 TABLET ORAL at 14:34

## 2019-02-03 RX ADMIN — TRAMADOL HYDROCHLORIDE 50 MG: 50 TABLET, FILM COATED ORAL at 02:13

## 2019-02-03 RX ADMIN — GABAPENTIN 800 MG: 400 CAPSULE ORAL at 21:01

## 2019-02-03 RX ADMIN — ENOXAPARIN SODIUM 40 MG: 40 INJECTION SUBCUTANEOUS at 07:52

## 2019-02-03 RX ADMIN — HYDROXYZINE HYDROCHLORIDE 25 MG: 10 TABLET ORAL at 21:08

## 2019-02-03 RX ADMIN — GABAPENTIN 800 MG: 400 CAPSULE ORAL at 14:29

## 2019-02-03 RX ADMIN — Medication: at 14:30

## 2019-02-03 RX ADMIN — HYDROXYZINE HYDROCHLORIDE 25 MG: 10 TABLET ORAL at 14:28

## 2019-02-03 RX ADMIN — LORAZEPAM 1 MG: 1 TABLET ORAL at 04:14

## 2019-02-03 RX ADMIN — TRAMADOL HYDROCHLORIDE 50 MG: 50 TABLET, FILM COATED ORAL at 07:53

## 2019-02-03 RX ADMIN — TRAMADOL HYDROCHLORIDE 50 MG: 50 TABLET, FILM COATED ORAL at 21:08

## 2019-02-03 ASSESSMENT — PAIN SCALES - GENERAL
PAINLEVEL_OUTOF10: 0
PAINLEVEL_OUTOF10: 0
PAINLEVEL_OUTOF10: 6
PAINLEVEL_OUTOF10: 5
PAINLEVEL_OUTOF10: 0
PAINLEVEL_OUTOF10: 6
PAINLEVEL_OUTOF10: 0
PAINLEVEL_OUTOF10: 4

## 2019-02-03 ASSESSMENT — PAIN DESCRIPTION - PAIN TYPE
TYPE: CHRONIC PAIN
TYPE: ACUTE PAIN

## 2019-02-03 ASSESSMENT — PAIN DESCRIPTION - LOCATION
LOCATION: GENERALIZED
LOCATION: GENERALIZED

## 2019-02-04 VITALS
RESPIRATION RATE: 16 BRPM | BODY MASS INDEX: 38.76 KG/M2 | HEIGHT: 61 IN | SYSTOLIC BLOOD PRESSURE: 95 MMHG | OXYGEN SATURATION: 95 % | TEMPERATURE: 98.2 F | HEART RATE: 118 BPM | WEIGHT: 205.3 LBS | DIASTOLIC BLOOD PRESSURE: 54 MMHG

## 2019-02-04 PROCEDURE — 6370000000 HC RX 637 (ALT 250 FOR IP): Performed by: HOSPITALIST

## 2019-02-04 PROCEDURE — 6370000000 HC RX 637 (ALT 250 FOR IP): Performed by: EMERGENCY MEDICINE

## 2019-02-04 RX ORDER — ALBUTEROL SULFATE 90 UG/1
AEROSOL, METERED RESPIRATORY (INHALATION)
Qty: 1 INHALER | Refills: 0 | Status: SHIPPED | OUTPATIENT
Start: 2019-02-04 | End: 2019-09-06 | Stop reason: SDUPTHER

## 2019-02-04 RX ORDER — QUETIAPINE FUMARATE 50 MG/1
150 TABLET, FILM COATED ORAL NIGHTLY
Qty: 60 TABLET | Refills: 3 | Status: SHIPPED | OUTPATIENT
Start: 2019-02-04 | End: 2019-09-06

## 2019-02-04 RX ADMIN — GABAPENTIN 800 MG: 400 CAPSULE ORAL at 08:54

## 2019-02-04 RX ADMIN — Medication 100 MG: at 08:54

## 2019-02-04 ASSESSMENT — PAIN SCALES - GENERAL: PAINLEVEL_OUTOF10: 0

## 2019-03-15 ENCOUNTER — APPOINTMENT (OUTPATIENT)
Dept: GENERAL RADIOLOGY | Age: 39
End: 2019-03-15
Payer: COMMERCIAL

## 2019-03-15 ENCOUNTER — HOSPITAL ENCOUNTER (EMERGENCY)
Age: 39
Discharge: HOME | End: 2019-03-15
Attending: EMERGENCY MEDICINE
Payer: COMMERCIAL

## 2019-03-15 ENCOUNTER — APPOINTMENT (OUTPATIENT)
Dept: CT IMAGING | Age: 39
End: 2019-03-15
Payer: COMMERCIAL

## 2019-03-15 VITALS
SYSTOLIC BLOOD PRESSURE: 103 MMHG | HEIGHT: 61 IN | WEIGHT: 195.33 LBS | DIASTOLIC BLOOD PRESSURE: 66 MMHG | BODY MASS INDEX: 36.88 KG/M2 | HEART RATE: 76 BPM | OXYGEN SATURATION: 94 % | TEMPERATURE: 97.8 F | RESPIRATION RATE: 16 BRPM

## 2019-03-15 DIAGNOSIS — M54.14 RADICULAR PAIN OF THORACIC REGION: Primary | ICD-10-CM

## 2019-03-15 LAB
ANION GAP SERPL CALCULATED.3IONS-SCNC: 10 MMOL/L (ref 3–16)
BASOPHILS ABSOLUTE: 0.1 K/UL (ref 0–0.2)
BASOPHILS RELATIVE PERCENT: 1 %
BUN BLDV-MCNC: 16 MG/DL (ref 7–20)
CALCIUM SERPL-MCNC: 9.7 MG/DL (ref 8.3–10.6)
CHLORIDE BLD-SCNC: 103 MMOL/L (ref 99–110)
CO2: 27 MMOL/L (ref 21–32)
CREAT SERPL-MCNC: 0.9 MG/DL (ref 0.6–1.1)
D DIMER: 331 NG/ML DDU (ref 0–229)
EKG ATRIAL RATE: 80 BPM
EKG DIAGNOSIS: NORMAL
EKG P AXIS: 39 DEGREES
EKG P-R INTERVAL: 92 MS
EKG Q-T INTERVAL: 344 MS
EKG QRS DURATION: 76 MS
EKG QTC CALCULATION (BAZETT): 396 MS
EKG R AXIS: 65 DEGREES
EKG T AXIS: 37 DEGREES
EKG VENTRICULAR RATE: 80 BPM
EOSINOPHILS ABSOLUTE: 0.2 K/UL (ref 0–0.6)
EOSINOPHILS RELATIVE PERCENT: 3.9 %
GFR AFRICAN AMERICAN: >60
GFR NON-AFRICAN AMERICAN: >60
GLUCOSE BLD-MCNC: 102 MG/DL (ref 70–99)
HCT VFR BLD CALC: 42.2 % (ref 36–48)
HEMOGLOBIN: 14.1 G/DL (ref 12–16)
LYMPHOCYTES ABSOLUTE: 1 K/UL (ref 1–5.1)
LYMPHOCYTES RELATIVE PERCENT: 16.7 %
MCH RBC QN AUTO: 33.2 PG (ref 26–34)
MCHC RBC AUTO-ENTMCNC: 33.3 G/DL (ref 31–36)
MCV RBC AUTO: 99.5 FL (ref 80–100)
MONOCYTES ABSOLUTE: 0.3 K/UL (ref 0–1.3)
MONOCYTES RELATIVE PERCENT: 5.1 %
NEUTROPHILS ABSOLUTE: 4.5 K/UL (ref 1.7–7.7)
NEUTROPHILS RELATIVE PERCENT: 73.3 %
PDW BLD-RTO: 13.3 % (ref 12.4–15.4)
PLATELET # BLD: 210 K/UL (ref 135–450)
PMV BLD AUTO: 9.1 FL (ref 5–10.5)
POTASSIUM REFLEX MAGNESIUM: 4.4 MMOL/L (ref 3.5–5.1)
RBC # BLD: 4.24 M/UL (ref 4–5.2)
SODIUM BLD-SCNC: 140 MMOL/L (ref 136–145)
TROPONIN: <0.01 NG/ML
TROPONIN: <0.01 NG/ML
WBC # BLD: 6.2 K/UL (ref 4–11)

## 2019-03-15 PROCEDURE — 71046 X-RAY EXAM CHEST 2 VIEWS: CPT

## 2019-03-15 PROCEDURE — 85379 FIBRIN DEGRADATION QUANT: CPT

## 2019-03-15 PROCEDURE — 36415 COLL VENOUS BLD VENIPUNCTURE: CPT

## 2019-03-15 PROCEDURE — 93010 ELECTROCARDIOGRAM REPORT: CPT | Performed by: INTERNAL MEDICINE

## 2019-03-15 PROCEDURE — 6360000004 HC RX CONTRAST MEDICATION: Performed by: EMERGENCY MEDICINE

## 2019-03-15 PROCEDURE — 96374 THER/PROPH/DIAG INJ IV PUSH: CPT

## 2019-03-15 PROCEDURE — 96375 TX/PRO/DX INJ NEW DRUG ADDON: CPT

## 2019-03-15 PROCEDURE — 99285 EMERGENCY DEPT VISIT HI MDM: CPT

## 2019-03-15 PROCEDURE — 93005 ELECTROCARDIOGRAM TRACING: CPT | Performed by: PHYSICIAN ASSISTANT

## 2019-03-15 PROCEDURE — 85025 COMPLETE CBC W/AUTO DIFF WBC: CPT

## 2019-03-15 PROCEDURE — 71260 CT THORAX DX C+: CPT

## 2019-03-15 PROCEDURE — 80048 BASIC METABOLIC PNL TOTAL CA: CPT

## 2019-03-15 PROCEDURE — 84484 ASSAY OF TROPONIN QUANT: CPT

## 2019-03-15 PROCEDURE — 6360000002 HC RX W HCPCS: Performed by: PHYSICIAN ASSISTANT

## 2019-03-15 RX ORDER — ORPHENADRINE CITRATE 30 MG/ML
60 INJECTION INTRAMUSCULAR; INTRAVENOUS ONCE
Status: COMPLETED | OUTPATIENT
Start: 2019-03-15 | End: 2019-03-15

## 2019-03-15 RX ORDER — METHYLPREDNISOLONE 4 MG/1
4 TABLET ORAL SEE ADMIN INSTRUCTIONS
Qty: 1 KIT | Refills: 0 | Status: SHIPPED | OUTPATIENT
Start: 2019-03-15 | End: 2019-03-21

## 2019-03-15 RX ORDER — KETOROLAC TROMETHAMINE 30 MG/ML
30 INJECTION, SOLUTION INTRAMUSCULAR; INTRAVENOUS ONCE
Status: COMPLETED | OUTPATIENT
Start: 2019-03-15 | End: 2019-03-15

## 2019-03-15 RX ADMIN — ORPHENADRINE CITRATE 60 MG: 30 INJECTION INTRAMUSCULAR; INTRAVENOUS at 12:32

## 2019-03-15 RX ADMIN — IOPAMIDOL 75 ML: 755 INJECTION, SOLUTION INTRAVENOUS at 12:57

## 2019-03-15 RX ADMIN — KETOROLAC TROMETHAMINE 30 MG: 30 INJECTION, SOLUTION INTRAMUSCULAR; INTRAVENOUS at 12:32

## 2019-03-15 ASSESSMENT — PAIN DESCRIPTION - LOCATION
LOCATION: BACK;CHEST

## 2019-03-15 ASSESSMENT — PAIN SCALES - GENERAL
PAINLEVEL_OUTOF10: 6

## 2019-03-15 ASSESSMENT — PAIN DESCRIPTION - DESCRIPTORS
DESCRIPTORS: SHARP

## 2019-03-15 ASSESSMENT — PAIN DESCRIPTION - PAIN TYPE
TYPE: ACUTE PAIN

## 2019-03-15 ASSESSMENT — HEART SCORE: ECG: 0

## 2019-03-15 ASSESSMENT — PAIN DESCRIPTION - ORIENTATION
ORIENTATION: LEFT

## 2019-03-26 ENCOUNTER — TELEPHONE (OUTPATIENT)
Dept: PULMONOLOGY | Age: 39
End: 2019-03-26

## 2019-03-29 ENCOUNTER — HOSPITAL ENCOUNTER (EMERGENCY)
Age: 39
Discharge: HOME | End: 2019-03-29
Attending: EMERGENCY MEDICINE
Payer: COMMERCIAL

## 2019-03-29 ENCOUNTER — APPOINTMENT (OUTPATIENT)
Dept: CT IMAGING | Age: 39
End: 2019-03-29
Payer: COMMERCIAL

## 2019-03-29 VITALS
DIASTOLIC BLOOD PRESSURE: 73 MMHG | OXYGEN SATURATION: 97 % | RESPIRATION RATE: 14 BRPM | HEART RATE: 79 BPM | BODY MASS INDEX: 36.42 KG/M2 | HEIGHT: 61 IN | SYSTOLIC BLOOD PRESSURE: 108 MMHG | TEMPERATURE: 97.2 F | WEIGHT: 192.9 LBS

## 2019-03-29 DIAGNOSIS — K52.9 COLITIS: Primary | ICD-10-CM

## 2019-03-29 LAB
A/G RATIO: 1.2 (ref 1.1–2.2)
ALBUMIN SERPL-MCNC: 4.1 G/DL (ref 3.4–5)
ALP BLD-CCNC: 96 U/L (ref 40–129)
ALT SERPL-CCNC: 13 U/L (ref 10–40)
ANION GAP SERPL CALCULATED.3IONS-SCNC: 9 MMOL/L (ref 3–16)
AST SERPL-CCNC: 17 U/L (ref 15–37)
BASOPHILS ABSOLUTE: 0.1 K/UL (ref 0–0.2)
BASOPHILS RELATIVE PERCENT: 0.8 %
BILIRUB SERPL-MCNC: 0.4 MG/DL (ref 0–1)
BILIRUBIN URINE: NEGATIVE
BLOOD, URINE: NEGATIVE
BUN BLDV-MCNC: 14 MG/DL (ref 7–20)
C-REACTIVE PROTEIN: 1.4 MG/L (ref 0–5.1)
CALCIUM SERPL-MCNC: 9.4 MG/DL (ref 8.3–10.6)
CHLORIDE BLD-SCNC: 105 MMOL/L (ref 99–110)
CLARITY: CLEAR
CO2: 25 MMOL/L (ref 21–32)
COLOR: YELLOW
CREAT SERPL-MCNC: 0.7 MG/DL (ref 0.6–1.1)
EOSINOPHILS ABSOLUTE: 0.3 K/UL (ref 0–0.6)
EOSINOPHILS RELATIVE PERCENT: 4.3 %
GFR AFRICAN AMERICAN: >60
GFR NON-AFRICAN AMERICAN: >60
GLOBULIN: 3.3 G/DL
GLUCOSE BLD-MCNC: 115 MG/DL (ref 70–99)
GLUCOSE URINE: NEGATIVE MG/DL
HCG QUALITATIVE: NEGATIVE
HCT VFR BLD CALC: 42.9 % (ref 36–48)
HEMOGLOBIN: 14.7 G/DL (ref 12–16)
KETONES, URINE: NEGATIVE MG/DL
LACTIC ACID: 1 MMOL/L (ref 0.4–2)
LEUKOCYTE ESTERASE, URINE: NEGATIVE
LIPASE: 25 U/L (ref 13–60)
LYMPHOCYTES ABSOLUTE: 1.6 K/UL (ref 1–5.1)
LYMPHOCYTES RELATIVE PERCENT: 24.4 %
MCH RBC QN AUTO: 33.5 PG (ref 26–34)
MCHC RBC AUTO-ENTMCNC: 34.2 G/DL (ref 31–36)
MCV RBC AUTO: 98.1 FL (ref 80–100)
MICROSCOPIC EXAMINATION: NORMAL
MONOCYTES ABSOLUTE: 0.3 K/UL (ref 0–1.3)
MONOCYTES RELATIVE PERCENT: 4.8 %
NEUTROPHILS ABSOLUTE: 4.4 K/UL (ref 1.7–7.7)
NEUTROPHILS RELATIVE PERCENT: 65.7 %
NITRITE, URINE: NEGATIVE
PDW BLD-RTO: 13 % (ref 12.4–15.4)
PH UA: 7 (ref 5–8)
PLATELET # BLD: 212 K/UL (ref 135–450)
PMV BLD AUTO: 9.3 FL (ref 5–10.5)
POTASSIUM REFLEX MAGNESIUM: 4 MMOL/L (ref 3.5–5.1)
PROTEIN UA: NEGATIVE MG/DL
RBC # BLD: 4.38 M/UL (ref 4–5.2)
SEDIMENTATION RATE, ERYTHROCYTE: 12 MM/HR (ref 0–20)
SODIUM BLD-SCNC: 139 MMOL/L (ref 136–145)
SPECIFIC GRAVITY UA: >1.03 (ref 1–1.03)
TOTAL PROTEIN: 7.4 G/DL (ref 6.4–8.2)
URINE REFLEX TO CULTURE: NORMAL
URINE TYPE: NORMAL
UROBILINOGEN, URINE: 0.2 E.U./DL
WBC # BLD: 6.6 K/UL (ref 4–11)

## 2019-03-29 PROCEDURE — 96361 HYDRATE IV INFUSION ADD-ON: CPT

## 2019-03-29 PROCEDURE — 6360000004 HC RX CONTRAST MEDICATION: Performed by: EMERGENCY MEDICINE

## 2019-03-29 PROCEDURE — 80053 COMPREHEN METABOLIC PANEL: CPT

## 2019-03-29 PROCEDURE — 99284 EMERGENCY DEPT VISIT MOD MDM: CPT

## 2019-03-29 PROCEDURE — 96374 THER/PROPH/DIAG INJ IV PUSH: CPT

## 2019-03-29 PROCEDURE — 85025 COMPLETE CBC W/AUTO DIFF WBC: CPT

## 2019-03-29 PROCEDURE — 84703 CHORIONIC GONADOTROPIN ASSAY: CPT

## 2019-03-29 PROCEDURE — 86140 C-REACTIVE PROTEIN: CPT

## 2019-03-29 PROCEDURE — 74177 CT ABD & PELVIS W/CONTRAST: CPT

## 2019-03-29 PROCEDURE — 83605 ASSAY OF LACTIC ACID: CPT

## 2019-03-29 PROCEDURE — 6370000000 HC RX 637 (ALT 250 FOR IP): Performed by: EMERGENCY MEDICINE

## 2019-03-29 PROCEDURE — 81003 URINALYSIS AUTO W/O SCOPE: CPT

## 2019-03-29 PROCEDURE — 6360000002 HC RX W HCPCS: Performed by: EMERGENCY MEDICINE

## 2019-03-29 PROCEDURE — 83690 ASSAY OF LIPASE: CPT

## 2019-03-29 PROCEDURE — 2580000003 HC RX 258: Performed by: EMERGENCY MEDICINE

## 2019-03-29 PROCEDURE — 36415 COLL VENOUS BLD VENIPUNCTURE: CPT

## 2019-03-29 PROCEDURE — 85652 RBC SED RATE AUTOMATED: CPT

## 2019-03-29 RX ORDER — METRONIDAZOLE 500 MG/1
500 TABLET ORAL 2 TIMES DAILY
Qty: 14 TABLET | Refills: 0 | Status: SHIPPED | OUTPATIENT
Start: 2019-03-29 | End: 2019-04-05

## 2019-03-29 RX ORDER — 0.9 % SODIUM CHLORIDE 0.9 %
1000 INTRAVENOUS SOLUTION INTRAVENOUS ONCE
Status: COMPLETED | OUTPATIENT
Start: 2019-03-29 | End: 2019-03-29

## 2019-03-29 RX ORDER — ONDANSETRON 2 MG/ML
4 INJECTION INTRAMUSCULAR; INTRAVENOUS ONCE
Status: COMPLETED | OUTPATIENT
Start: 2019-03-29 | End: 2019-03-29

## 2019-03-29 RX ORDER — ONDANSETRON 4 MG/1
4 TABLET, FILM COATED ORAL EVERY 8 HOURS PRN
Qty: 20 TABLET | Refills: 0 | Status: SHIPPED | OUTPATIENT
Start: 2019-03-29 | End: 2019-09-06

## 2019-03-29 RX ORDER — CIPROFLOXACIN 500 MG/1
500 TABLET, FILM COATED ORAL 2 TIMES DAILY
Qty: 14 TABLET | Refills: 0 | Status: SHIPPED | OUTPATIENT
Start: 2019-03-29 | End: 2019-04-05

## 2019-03-29 RX ORDER — DICYCLOMINE HYDROCHLORIDE 10 MG/1
10 CAPSULE ORAL EVERY 6 HOURS PRN
Qty: 20 CAPSULE | Refills: 0 | Status: SHIPPED | OUTPATIENT
Start: 2019-03-29 | End: 2019-09-06

## 2019-03-29 RX ADMIN — HYOSCYAMINE SULFATE 125 MCG: 0.12 TABLET ORAL; SUBLINGUAL at 15:45

## 2019-03-29 RX ADMIN — SODIUM CHLORIDE 1000 ML: 9 INJECTION, SOLUTION INTRAVENOUS at 14:55

## 2019-03-29 RX ADMIN — IOPAMIDOL 75 ML: 755 INJECTION, SOLUTION INTRAVENOUS at 14:38

## 2019-03-29 RX ADMIN — ONDANSETRON 4 MG: 2 INJECTION INTRAMUSCULAR; INTRAVENOUS at 14:55

## 2019-03-29 ASSESSMENT — PAIN - FUNCTIONAL ASSESSMENT: PAIN_FUNCTIONAL_ASSESSMENT: PREVENTS OR INTERFERES SOME ACTIVE ACTIVITIES AND ADLS

## 2019-03-29 ASSESSMENT — PAIN DESCRIPTION - LOCATION: LOCATION: ABDOMEN

## 2019-03-29 ASSESSMENT — PAIN DESCRIPTION - PAIN TYPE: TYPE: ACUTE PAIN

## 2019-03-29 ASSESSMENT — PAIN DESCRIPTION - PROGRESSION: CLINICAL_PROGRESSION: GRADUALLY WORSENING

## 2019-03-29 ASSESSMENT — PAIN DESCRIPTION - DESCRIPTORS: DESCRIPTORS: CRAMPING;CONSTANT

## 2019-03-29 ASSESSMENT — PAIN DESCRIPTION - FREQUENCY: FREQUENCY: CONTINUOUS

## 2019-03-29 ASSESSMENT — PAIN DESCRIPTION - ONSET: ONSET: PROGRESSIVE

## 2019-03-29 ASSESSMENT — PAIN SCALES - GENERAL
PAINLEVEL_OUTOF10: 6
PAINLEVEL_OUTOF10: 5
PAINLEVEL_OUTOF10: 5

## 2019-03-29 NOTE — ED NOTES
Pt able to provide urine specimen. Unable to provide stool specimen.       Yessica Randall RN  03/29/19 9868

## 2019-03-29 NOTE — ED NOTES
Pt resting comfortably on stretcher, in NAD. Pt is awake, alert and oriented x 4. Respirations easy and non-labored. Skin warm and dry. VSS. Awaiting test results and update form provider. Pt verbalized understanding. Call light in reach.       Ace Mon RN  03/29/19 0057

## 2019-03-29 NOTE — ED PROVIDER NOTES
Procedure Laterality Date    COLONOSCOPY  10/20/15    possible ischemic colitis    DILATION AND CURETTAGE OF UTERUS  10/11/2016    Hysteroscopy, Novasure Ablation    LITHOTRIPSY      x 7    PERCUTANEOUS BALLOON VALVULOPLASTY  2007    TUBAL LIGATION  2001    URETER STENT PLACEMENT      X 2 then removed         CURRENT MEDICATIONS       Previous Medications    ALBUTEROL SULFATE  (90 BASE) MCG/ACT INHALER    Use 2 puffs 4 times daily for 7 days then as needed for wheezing. Dispense with Spacer and instruct in use. At patient's preference may use 60 dose MDI. May Sub Pro-Air or Proventil as needed per insurance. GABAPENTIN (NEURONTIN) 800 MG TABLET    Take 800 mg by mouth 3 times daily. .    QUETIAPINE (SEROQUEL) 50 MG TABLET    Take 3 tablets by mouth nightly       ALLERGIES     Patient has no known allergies.     FAMILY HISTORY       Family History   Problem Relation Age of Onset    High Blood Pressure Father     Kidney Disease Father     Cancer Maternal Grandmother     Cancer Maternal Grandfather     Kidney Disease Maternal Grandfather     Cancer Paternal Grandmother     Cancer Paternal Grandfather     Cancer Other 3        ALL    Diabetes Maternal Uncle     COPD Mother     Depression Sister           SOCIAL HISTORY       Social History     Socioeconomic History    Marital status: Legally      Spouse name: Uma Anguiano Number of children: 3    Years of education: 15    Highest education level: None   Occupational History    Occupation: unemployed   Social Needs    Financial resource strain: None    Food insecurity:     Worry: None     Inability: None    Transportation needs:     Medical: None     Non-medical: None   Tobacco Use    Smoking status: Current Every Day Smoker     Packs/day: 1.00     Years: 21.00     Pack years: 21.00     Types: Cigarettes     Start date: 4/15/1995    Smokeless tobacco: Never Used   Substance and Sexual Activity    Alcohol use: Not ACID, PLASMA    Narrative:     Performed at:  [facility]  [address] [facility] [address] [place] [address]   Phone [phone]   BLOOD OCCULT STOOL SCREEN #1       All other labs were within normal range or not returned as of this dictation. EMERGENCY DEPARTMENT COURSE and DIFFERENTIAL DIAGNOSIS/MDM:   Vitals:    Vitals:    03/29/19 1256 03/29/19 1553   BP: 109/74 108/73   Pulse: 101 79   Resp: 17 14   Temp: 97.2 °F (36.2 °C)    TempSrc: Oral    SpO2: 99% 97%   Weight: 192 lb 14.4 oz (87.5 kg)    Height: 5' 1\" (1.549 m)          MDM  55-year-old female present to the ER with complaint of abdominal pain and maroon-colored stool. She does have a picture on her cell phone that the shower on color stool from last night. Vital signs show mild tachycardia. Physical exam as above. Patient resting complaining the room. Does have diffuse abdominal tenderness. Past admissions have shown concern for ischemic colitis versus infectious colitis versus ulcerative colitis. She has not followed up with GI. We'll get a CT of the abdomen and pelvis and abdominal workup. Urinalysis negative. HCG negative. CBC, CMP, lipase, ESR and CRP within normal limits. Lactic acid normal.  CT scan shows nonobstructing bilateral nephrolithiasis. No other acute pathology. Patient reports pain is improved. Her abdominal exam is benign at this time. She's had no further episodes of nausea vomiting. She is tolerating p.o. She's had these attacks before that have resolved with Cipro and Flagyl will prescribe those medications as well as Zofran and Bentyl for symptoms. Instructed to follow up with GI in 1-2 days or return to the ER if any new or concerning symptoms arise. Patient agrees with discharge plan. CONSULTS:  None      FINAL IMPRESSION      1.  Colitis          DISPOSITION/PLAN   DISPOSITION Decision To Discharge 03/29/2019 04:08:32 PM      PATIENT REFERRED TO:  [place] Schultz RN, NP  [address]  [phone]    Schedule an appointment as soon as possible for a visit in 2 days  If symptoms worsen, As needed    Susie Duffy., DO  [address]  [address]  [address] [facility]    Schedule an appointment as soon as possible for a visit in 2 days  As needed, If symptoms worsen      DISCHARGE MEDICATIONS:  New Prescriptions    CIPROFLOXACIN (CIPRO) 500 MG TABLET    Take 1 tablet by mouth 2 times daily for 7 days    DICYCLOMINE (BENTYL) 10 MG CAPSULE    Take 1 capsule by mouth every 6 hours as needed (cramps)    METRONIDAZOLE (FLAGYL) 500 MG TABLET    Take 1 tablet by mouth 2 times daily for 7 days    ONDANSETRON (ZOFRAN) 4 MG TABLET    Take 1 tablet by mouth every 8 hours as needed for Nausea          (Please note that portions of this note were completed with a voice recognition program.  Efforts were made to edit the dictations but occasionally words are mis-transcribed.)    Suzette Trevino DO (electronically signed)  Attending Emergency Physician      Suzette Trevino DO  03/29/19 7513

## 2019-09-06 ENCOUNTER — HOSPITAL ENCOUNTER (EMERGENCY)
Age: 39
Discharge: HOME | End: 2019-09-06
Attending: EMERGENCY MEDICINE
Payer: COMMERCIAL

## 2019-09-06 VITALS
BODY MASS INDEX: 36.2 KG/M2 | SYSTOLIC BLOOD PRESSURE: 114 MMHG | RESPIRATION RATE: 16 BRPM | WEIGHT: 191.58 LBS | DIASTOLIC BLOOD PRESSURE: 75 MMHG | TEMPERATURE: 97.1 F | OXYGEN SATURATION: 95 % | HEART RATE: 55 BPM

## 2019-09-06 DIAGNOSIS — Z72.0 TOBACCO ABUSE: ICD-10-CM

## 2019-09-06 DIAGNOSIS — J45.30 MILD PERSISTENT ASTHMA WITHOUT COMPLICATION: Primary | ICD-10-CM

## 2019-09-06 PROCEDURE — 99282 EMERGENCY DEPT VISIT SF MDM: CPT

## 2019-09-06 RX ORDER — FLUOXETINE HYDROCHLORIDE 20 MG/1
20 CAPSULE ORAL DAILY
COMMUNITY
End: 2019-12-16

## 2019-09-06 RX ORDER — FLUTICASONE PROPIONATE 44 UG/1
2 AEROSOL, METERED RESPIRATORY (INHALATION) 2 TIMES DAILY
Qty: 1 INHALER | Refills: 1 | Status: ON HOLD | OUTPATIENT
Start: 2019-09-06 | End: 2020-08-12

## 2019-09-06 RX ORDER — ALBUTEROL SULFATE 90 UG/1
AEROSOL, METERED RESPIRATORY (INHALATION)
Qty: 1 INHALER | Refills: 2 | Status: SHIPPED | OUTPATIENT
Start: 2019-09-06 | End: 2020-06-01 | Stop reason: SDUPTHER

## 2019-09-06 NOTE — ED TRIAGE NOTES
Pt to ED with complaint of trouble getting a deep breath. States her asthma has been \"acting up\" and she ran out of her Albuterol inhaler 2 days ago. States has had a slight cough, nonproductive, denies fever.

## 2019-09-06 NOTE — ED PROVIDER NOTES
potential consequences of continued tobacco abuse. I encouraged the continuation of this discussion at the follow up appointment with the primary care provider. An informational handout was provided to the patient regarding tobacco cessation. Patient instructed to follow up with their primary care doctor in one-two days and return to the emergency department if worsening of the condition or any other concerns. Please see discharge instructions for further delineation regarding the specific discharge instructions explained and given to the patient. CONSULTS:  None    PROCEDURES:  Unless otherwise noted below, none     Procedures    FINAL IMPRESSION      1. Mild persistent asthma without complication    2. Tobacco abuse          DISPOSITION/PLAN   DISPOSITION Decision To Discharge 09/06/2019 08:05:05 PM      PATIENT REFERRED TO:  Reggie Lake, RN, NP  [address] [employer] [address] [place] [address]  [phone]    Schedule an appointment as soon as possible for a visit       [facility] [address] [address]  [phone]    If symptoms worsen      DISCHARGE MEDICATIONS:  Discharge Medication List as of 9/6/2019  8:14 PM      START taking these medications    Details   fluticasone (FLOVENT HFA) 44 MCG/ACT inhaler Inhale 2 puffs into the lungs 2 times daily Please use with spacer - ok to substitute to equivalent., Disp-1 Inhaler, R-1Print                (Please note:  Portions of this note were completed with a voice recognition program. Efforts were made to edit the dictations but occasionally words and phrases are mis-transcribed.)    Form v2016. J.5-cn    Rhett Bruno DO (electronically signed)  Emergency Medicine Provider              Shan Wheeler DO  09/06/19 5114

## 2019-12-16 ENCOUNTER — HOSPITAL ENCOUNTER (EMERGENCY)
Age: 39
Discharge: HOME | End: 2019-12-16
Payer: COMMERCIAL

## 2019-12-16 ENCOUNTER — APPOINTMENT (OUTPATIENT)
Dept: GENERAL RADIOLOGY | Age: 39
End: 2019-12-16
Payer: COMMERCIAL

## 2019-12-16 VITALS
BODY MASS INDEX: 32.47 KG/M2 | WEIGHT: 172 LBS | TEMPERATURE: 97.6 F | OXYGEN SATURATION: 98 % | HEIGHT: 61 IN | RESPIRATION RATE: 16 BRPM | HEART RATE: 93 BPM | DIASTOLIC BLOOD PRESSURE: 81 MMHG | SYSTOLIC BLOOD PRESSURE: 124 MMHG

## 2019-12-16 DIAGNOSIS — R07.81 RIB PAIN ON RIGHT SIDE: Primary | ICD-10-CM

## 2019-12-16 DIAGNOSIS — R05.9 COUGH: ICD-10-CM

## 2019-12-16 PROCEDURE — 6370000000 HC RX 637 (ALT 250 FOR IP): Performed by: PHYSICIAN ASSISTANT

## 2019-12-16 PROCEDURE — 71046 X-RAY EXAM CHEST 2 VIEWS: CPT

## 2019-12-16 PROCEDURE — 99283 EMERGENCY DEPT VISIT LOW MDM: CPT

## 2019-12-16 RX ORDER — BENZONATATE 100 MG/1
100 CAPSULE ORAL 3 TIMES DAILY PRN
Qty: 15 CAPSULE | Refills: 0 | Status: SHIPPED | OUTPATIENT
Start: 2019-12-16 | End: 2019-12-19

## 2019-12-16 RX ORDER — BUSPIRONE HYDROCHLORIDE 10 MG/1
TABLET ORAL
COMMUNITY
End: 2020-05-18

## 2019-12-16 RX ORDER — HYDROCODONE BITARTRATE AND ACETAMINOPHEN 5; 325 MG/1; MG/1
1 TABLET ORAL EVERY 8 HOURS PRN
Qty: 10 TABLET | Refills: 0 | Status: SHIPPED | OUTPATIENT
Start: 2019-12-16 | End: 2019-12-26

## 2019-12-16 RX ORDER — BUPRENORPHINE AND NALOXONE 8; 2 MG/1; MG/1
1 FILM, SOLUBLE BUCCAL; SUBLINGUAL DAILY
Status: ON HOLD | COMMUNITY
End: 2020-08-12

## 2019-12-16 RX ORDER — METHOCARBAMOL 750 MG/1
750 TABLET, FILM COATED ORAL 4 TIMES DAILY
Qty: 40 TABLET | Refills: 0 | Status: SHIPPED | OUTPATIENT
Start: 2019-12-16 | End: 2019-12-26

## 2019-12-16 RX ORDER — HYDROCODONE BITARTRATE AND ACETAMINOPHEN 7.5; 325 MG/1; MG/1
1 TABLET ORAL ONCE
Status: COMPLETED | OUTPATIENT
Start: 2019-12-16 | End: 2019-12-16

## 2019-12-16 RX ADMIN — HYDROCODONE BITARTRATE AND ACETAMINOPHEN 1 TABLET: 7.5; 325 TABLET ORAL at 15:13

## 2019-12-16 ASSESSMENT — PAIN DESCRIPTION - PAIN TYPE: TYPE: ACUTE PAIN

## 2019-12-16 ASSESSMENT — PAIN SCALES - GENERAL
PAINLEVEL_OUTOF10: 4
PAINLEVEL_OUTOF10: 4

## 2019-12-16 ASSESSMENT — PAIN DESCRIPTION - LOCATION: LOCATION: RIB CAGE

## 2019-12-16 ASSESSMENT — PAIN DESCRIPTION - ORIENTATION: ORIENTATION: LEFT

## 2020-04-24 ENCOUNTER — VIRTUAL VISIT (OUTPATIENT)
Dept: FAMILY MEDICINE CLINIC | Age: 40
End: 2020-04-24
Payer: COMMERCIAL

## 2020-04-24 VITALS — BODY MASS INDEX: 30.91 KG/M2 | WEIGHT: 168 LBS | TEMPERATURE: 100.6 F | HEIGHT: 62 IN

## 2020-04-24 PROCEDURE — G8427 DOCREV CUR MEDS BY ELIG CLIN: HCPCS | Performed by: PHYSICIAN ASSISTANT

## 2020-04-24 PROCEDURE — 4004F PT TOBACCO SCREEN RCVD TLK: CPT | Performed by: PHYSICIAN ASSISTANT

## 2020-04-24 PROCEDURE — 99204 OFFICE O/P NEW MOD 45 MIN: CPT | Performed by: PHYSICIAN ASSISTANT

## 2020-04-24 PROCEDURE — G8419 CALC BMI OUT NRM PARAM NOF/U: HCPCS | Performed by: PHYSICIAN ASSISTANT

## 2020-04-24 RX ORDER — FLUOXETINE HYDROCHLORIDE 20 MG/1
40 CAPSULE ORAL DAILY
Status: ON HOLD | COMMUNITY
End: 2020-08-12

## 2020-04-24 RX ORDER — ALBUTEROL SULFATE 90 UG/1
2 AEROSOL, METERED RESPIRATORY (INHALATION) 4 TIMES DAILY PRN
Qty: 1 INHALER | Refills: 0 | Status: SHIPPED | OUTPATIENT
Start: 2020-04-24 | End: 2020-08-25

## 2020-04-24 RX ORDER — RISPERIDONE 2 MG/1
2 TABLET, FILM COATED ORAL NIGHTLY
Status: ON HOLD | COMMUNITY
End: 2020-08-12

## 2020-04-24 ASSESSMENT — ENCOUNTER SYMPTOMS
RHINORRHEA: 0
VOMITING: 0
ABDOMINAL PAIN: 0
COUGH: 0
NAUSEA: 0
SORE THROAT: 0
CONSTIPATION: 0
SHORTNESS OF BREATH: 0
DIARRHEA: 0

## 2020-04-24 NOTE — PROGRESS NOTES
2020    TELEHEALTH EVALUATION -- Audio/Visual (During CGZXR-45 public health emergency)    Could not get video to work, conducted through telephone encounter. HPI:    Sever[place] Fowler (:  1980) has requested an audio/video evaluation for the following concern(s):    Establish care. Previous patient of the practice but has not been seen in >3 years. Patient with history of opiate abuse- started after multiple kidney surgeries in 2005. Had been on and off pain medication and suboxone since then. Last use was , has been clean 1 year. Follows with mental health specialist and addiction specialist at [facility]. Sees them twice weekly. On prozac 40 mg, (for mood and hot flashes), Risperidone 2 mg nightly(mostly for sleep). Is still on suboxone, does not like being on the medication. Sleeping ok, normal appetite. Denies si/hi. Good support system:  2 years, has 2 children 18 & 24 years do not live at home. Works as a . Smokes 1 ppd. No alcohol use x1 year. Post menopausal. Has not had menses in last 5 years. GYN did labs that showed postmenopausal state. Last pap 6 months ago- [facility]? Increasing HA over the last month. A few days ago felt sudden onset of fatigue, body aches and chills. Woke up with a fever of 102. Also with shortness of breath, but has history of asthma. Has lost some taste sensation. Feels fine until she gets a fever. No known covid exposure. Review of Systems   Constitutional: Positive for fever. Negative for activity change and chills. HENT: Negative for congestion, ear pain, rhinorrhea and sore throat. Eyes: Negative for visual disturbance. Respiratory: Negative for cough and shortness of breath. Cardiovascular: Negative for chest pain and palpitations. Gastrointestinal: Negative for abdominal pain, constipation, diarrhea, nausea and vomiting.    Genitourinary: Negative for difficulty urinating 12/28/2023    DTaP/Tdap/Td vaccine (2 - Td) 04/15/2026    Hepatitis A vaccine  Aged Out    Hepatitis B vaccine  Aged Out    Hib vaccine  Aged Out    Meningococcal (ACWY) vaccine  Aged Out       PHYSICAL EXAMINATION:  [ INSTRUCTIONS:  \"[x]\" Indicates a positive item  \"[]\" Indicates a negative item  -- DELETE ALL ITEMS NOT EXAMINED]  Vital Signs: (As obtained by patient/caregiver or practitioner observation)    Blood pressure-  Heart rate-    Respiratory rate-    Temperature-  Pulse oximetry-     Constitutional: [x] Appears well-developed and well-nourished [x] No apparent distress      [] Abnormal-   Mental status  [x] Alert and awake  [x] Oriented to person/place/time []Able to follow commands      Eyes:  EOM    [x]  Normal  [] Abnormal-  Sclera  [x]  Normal  [] Abnormal -         Discharge []  None visible  [] Abnormal -    HENT:   [x] Normocephalic, atraumatic. [] Abnormal   [] Mouth/Throat: Mucous membranes are moist.     External Ears [x] Normal  [] Abnormal-     Neck: [x] No visualized mass     Pulmonary/Chest: [x] Respiratory effort normal.  [] No visualized signs of difficulty breathing or respiratory distress        [] Abnormal-      Musculoskeletal:   [x] Normal gait with no signs of ataxia         [x] Normal range of motion of neck        [] Abnormal-       Neurological:        [x] No Facial Asymmetry (Cranial nerve 7 motor function) (limited exam to video visit)          [x] No gaze palsy        [] Abnormal-         Skin:        [x] No significant exanthematous lesions or discoloration noted on facial skin         [] Abnormal-            Psychiatric:       [x] Normal Affect [] No Hallucinations        [] Abnormal-     Other pertinent observable physical exam findings-     ASSESSMENT/PLAN:  1. Encounter to establish care  - CBC; Future  - LIPID PANEL; Future  - TSH without Reflex; Future  - COMPREHENSIVE METABOLIC PANEL;  Future  - up to date on pap, will request records  - address vaccines at next OV    2. Mild intermittent asthma without complication  - albuterol sulfate HFA (VENTOLIN HFA) 108 (90 Base) MCG/ACT inhaler; Inhale 2 puffs into the lungs 4 times daily as needed for Wheezing  Dispense: 1 Inhaler; Refill: 0  - COMPREHENSIVE METABOLIC PANEL; Future    3. Bipolar disorder, current episode mixed, mild ([facility]  4. Borderline personality disorder ([facility]  - Follows with psychiatry, stable on current medication regimen   - CBC; Future  - LIPID PANEL; Future  - COMPREHENSIVE METABOLIC PANEL; Future    5. History of substance abuse ([facility]  - Currently on suboxone  - Will request records     6. Weight gain  - possible side effect of the risperidone, will check labs. - TSH without Reflex; Future  - COMPREHENSIVE METABOLIC PANEL; Future    7. Fever, unspecified fever cause  - unclear etiology   - given fever and lost of taste, possibly covid. Pt with baseline soa 2/2 to asthma. She is to call with worsening soa or new onset cough. No follow-ups on file. Mendy Burciaga is a 37 y.o. female being evaluated by a Virtual Visit (video visit) encounter to address concerns as mentioned above. A caregiver was present when appropriate. Due to this being a TeleHealth encounter (During [facility]- public [facility] emergency), evaluation of the following organ systems was limited: Vitals/Constitutional/EENT/Resp/CV/GI//MS/Neuro/Skin/Heme-Lymph-Imm. Pursuant to the emergency declaration under the [address] [place] [address] authority and the [employer] Act, this Virtual Visit was conducted with patient's (and/or legal guardian's) consent, to reduce the patient's risk of exposure to COVID-19 and provide necessary medical care. The patient (and/or legal guardian) has also been advised to contact this office for worsening conditions or problems, and seek emergency medical treatment and/or call 911 if deemed necessary.      Patient

## 2020-05-18 ENCOUNTER — APPOINTMENT (OUTPATIENT)
Dept: CT IMAGING | Age: 40
End: 2020-05-18
Payer: COMMERCIAL

## 2020-05-18 ENCOUNTER — HOSPITAL ENCOUNTER (EMERGENCY)
Age: 40
Discharge: HOME | End: 2020-05-18
Payer: COMMERCIAL

## 2020-05-18 VITALS
TEMPERATURE: 98.1 F | RESPIRATION RATE: 14 BRPM | BODY MASS INDEX: 32.2 KG/M2 | HEART RATE: 76 BPM | OXYGEN SATURATION: 96 % | WEIGHT: 175 LBS | SYSTOLIC BLOOD PRESSURE: 123 MMHG | DIASTOLIC BLOOD PRESSURE: 86 MMHG | HEIGHT: 62 IN

## 2020-05-18 LAB
A/G RATIO: 1.5 (ref 1.1–2.2)
ALBUMIN SERPL-MCNC: 4.4 G/DL (ref 3.4–5)
ALP BLD-CCNC: 84 U/L (ref 40–129)
ALT SERPL-CCNC: 26 U/L (ref 10–40)
AMORPHOUS: ABNORMAL /HPF
ANION GAP SERPL CALCULATED.3IONS-SCNC: 9 MMOL/L (ref 3–16)
AST SERPL-CCNC: 26 U/L (ref 15–37)
BACTERIA: ABNORMAL /HPF
BASOPHILS ABSOLUTE: 0.1 K/UL (ref 0–0.2)
BASOPHILS RELATIVE PERCENT: 1.1 %
BILIRUB SERPL-MCNC: 0.4 MG/DL (ref 0–1)
BILIRUBIN URINE: NEGATIVE
BLOOD, URINE: ABNORMAL
BUN BLDV-MCNC: 17 MG/DL (ref 7–20)
CALCIUM SERPL-MCNC: 10 MG/DL (ref 8.3–10.6)
CHLORIDE BLD-SCNC: 100 MMOL/L (ref 99–110)
CLARITY: CLEAR
CO2: 28 MMOL/L (ref 21–32)
COLOR: YELLOW
CREAT SERPL-MCNC: 0.7 MG/DL (ref 0.6–1.1)
EOSINOPHILS ABSOLUTE: 0.3 K/UL (ref 0–0.6)
EOSINOPHILS RELATIVE PERCENT: 4.5 %
EPITHELIAL CELLS, UA: ABNORMAL /HPF (ref 0–5)
GFR AFRICAN AMERICAN: >60
GFR NON-AFRICAN AMERICAN: >60
GLOBULIN: 3 G/DL
GLUCOSE BLD-MCNC: 109 MG/DL (ref 70–99)
GLUCOSE URINE: NEGATIVE MG/DL
HCT VFR BLD CALC: 45.5 % (ref 36–48)
HEMOGLOBIN: 15.4 G/DL (ref 12–16)
KETONES, URINE: NEGATIVE MG/DL
LEUKOCYTE ESTERASE, URINE: NEGATIVE
LIPASE: 82 U/L (ref 13–60)
LYMPHOCYTES ABSOLUTE: 1.7 K/UL (ref 1–5.1)
LYMPHOCYTES RELATIVE PERCENT: 27.8 %
MCH RBC QN AUTO: 33.4 PG (ref 26–34)
MCHC RBC AUTO-ENTMCNC: 33.8 G/DL (ref 31–36)
MCV RBC AUTO: 98.6 FL (ref 80–100)
MICROSCOPIC EXAMINATION: YES
MONOCYTES ABSOLUTE: 0.4 K/UL (ref 0–1.3)
MONOCYTES RELATIVE PERCENT: 6.4 %
NEUTROPHILS ABSOLUTE: 3.6 K/UL (ref 1.7–7.7)
NEUTROPHILS RELATIVE PERCENT: 60.2 %
NITRITE, URINE: NEGATIVE
PDW BLD-RTO: 14.5 % (ref 12.4–15.4)
PH UA: 7.5 (ref 5–8)
PLATELET # BLD: 200 K/UL (ref 135–450)
PMV BLD AUTO: 8.6 FL (ref 5–10.5)
POTASSIUM SERPL-SCNC: 4.1 MMOL/L (ref 3.5–5.1)
PROTEIN UA: NEGATIVE MG/DL
RBC # BLD: 4.61 M/UL (ref 4–5.2)
RBC UA: ABNORMAL /HPF (ref 0–4)
SODIUM BLD-SCNC: 137 MMOL/L (ref 136–145)
SPECIFIC GRAVITY UA: 1.02 (ref 1–1.03)
TOTAL PROTEIN: 7.4 G/DL (ref 6.4–8.2)
URINE TYPE: ABNORMAL
UROBILINOGEN, URINE: 0.2 E.U./DL
WBC # BLD: 5.9 K/UL (ref 4–11)
WBC UA: ABNORMAL /HPF (ref 0–5)

## 2020-05-18 PROCEDURE — 96374 THER/PROPH/DIAG INJ IV PUSH: CPT

## 2020-05-18 PROCEDURE — 96375 TX/PRO/DX INJ NEW DRUG ADDON: CPT

## 2020-05-18 PROCEDURE — 80053 COMPREHEN METABOLIC PANEL: CPT

## 2020-05-18 PROCEDURE — 85025 COMPLETE CBC W/AUTO DIFF WBC: CPT

## 2020-05-18 PROCEDURE — 83690 ASSAY OF LIPASE: CPT

## 2020-05-18 PROCEDURE — 6360000002 HC RX W HCPCS

## 2020-05-18 PROCEDURE — 6360000002 HC RX W HCPCS: Performed by: NURSE PRACTITIONER

## 2020-05-18 PROCEDURE — 74176 CT ABD & PELVIS W/O CONTRAST: CPT

## 2020-05-18 PROCEDURE — 81001 URINALYSIS AUTO W/SCOPE: CPT

## 2020-05-18 PROCEDURE — 99284 EMERGENCY DEPT VISIT MOD MDM: CPT

## 2020-05-18 PROCEDURE — 96376 TX/PRO/DX INJ SAME DRUG ADON: CPT

## 2020-05-18 PROCEDURE — 2580000003 HC RX 258: Performed by: NURSE PRACTITIONER

## 2020-05-18 RX ORDER — HYDROCODONE BITARTRATE AND ACETAMINOPHEN 5; 325 MG/1; MG/1
1 TABLET ORAL EVERY 6 HOURS PRN
Qty: 10 TABLET | Refills: 0 | Status: SHIPPED | OUTPATIENT
Start: 2020-05-18 | End: 2020-05-21

## 2020-05-18 RX ORDER — ONDANSETRON 2 MG/ML
INJECTION INTRAMUSCULAR; INTRAVENOUS
Status: COMPLETED
Start: 2020-05-18 | End: 2020-05-18

## 2020-05-18 RX ORDER — MORPHINE SULFATE 4 MG/ML
INJECTION, SOLUTION INTRAMUSCULAR; INTRAVENOUS
Status: COMPLETED
Start: 2020-05-18 | End: 2020-05-18

## 2020-05-18 RX ORDER — ONDANSETRON 2 MG/ML
4 INJECTION INTRAMUSCULAR; INTRAVENOUS ONCE
Status: COMPLETED | OUTPATIENT
Start: 2020-05-18 | End: 2020-05-18

## 2020-05-18 RX ORDER — 0.9 % SODIUM CHLORIDE 0.9 %
1000 INTRAVENOUS SOLUTION INTRAVENOUS ONCE
Status: COMPLETED | OUTPATIENT
Start: 2020-05-18 | End: 2020-05-18

## 2020-05-18 RX ORDER — ONDANSETRON 4 MG/1
4 TABLET, ORALLY DISINTEGRATING ORAL EVERY 8 HOURS PRN
Qty: 20 TABLET | Refills: 0 | Status: SHIPPED | OUTPATIENT
Start: 2020-05-18 | End: 2020-08-25

## 2020-05-18 RX ORDER — MORPHINE SULFATE 4 MG/ML
4 INJECTION, SOLUTION INTRAMUSCULAR; INTRAVENOUS ONCE
Status: COMPLETED | OUTPATIENT
Start: 2020-05-18 | End: 2020-05-18

## 2020-05-18 RX ORDER — KETOROLAC TROMETHAMINE 30 MG/ML
30 INJECTION, SOLUTION INTRAMUSCULAR; INTRAVENOUS ONCE
Status: COMPLETED | OUTPATIENT
Start: 2020-05-18 | End: 2020-05-18

## 2020-05-18 RX ORDER — TAMSULOSIN HYDROCHLORIDE 0.4 MG/1
0.4 CAPSULE ORAL DAILY
Qty: 5 CAPSULE | Refills: 0 | Status: SHIPPED | OUTPATIENT
Start: 2020-05-18 | End: 2020-08-25

## 2020-05-18 RX ADMIN — ONDANSETRON 4 MG: 2 INJECTION INTRAMUSCULAR; INTRAVENOUS at 19:22

## 2020-05-18 RX ADMIN — ONDANSETRON 4 MG: 2 INJECTION INTRAMUSCULAR; INTRAVENOUS at 18:28

## 2020-05-18 RX ADMIN — MORPHINE SULFATE 4 MG: 4 INJECTION, SOLUTION INTRAMUSCULAR; INTRAVENOUS at 19:23

## 2020-05-18 RX ADMIN — KETOROLAC TROMETHAMINE 30 MG: 30 INJECTION, SOLUTION INTRAMUSCULAR at 18:28

## 2020-05-18 RX ADMIN — SODIUM CHLORIDE 1000 ML: 9 INJECTION, SOLUTION INTRAVENOUS at 18:30

## 2020-05-18 ASSESSMENT — ENCOUNTER SYMPTOMS
NAUSEA: 1
VOMITING: 0
BACK PAIN: 0
DIARRHEA: 0
COLOR CHANGE: 0
COUGH: 0
ABDOMINAL PAIN: 1
WHEEZING: 0
SHORTNESS OF BREATH: 0

## 2020-05-18 ASSESSMENT — PAIN DESCRIPTION - DESCRIPTORS: DESCRIPTORS: THROBBING

## 2020-05-18 ASSESSMENT — PAIN DESCRIPTION - LOCATION: LOCATION: FLANK

## 2020-05-18 ASSESSMENT — PAIN SCALES - GENERAL
PAINLEVEL_OUTOF10: 8
PAINLEVEL_OUTOF10: 7
PAINLEVEL_OUTOF10: 7

## 2020-05-18 ASSESSMENT — PAIN DESCRIPTION - PAIN TYPE: TYPE: ACUTE PAIN

## 2020-05-18 ASSESSMENT — PAIN DESCRIPTION - ORIENTATION: ORIENTATION: RIGHT

## 2020-05-18 NOTE — ED PROVIDER NOTES
**[facility]  [facility]  EMERGENCY DEPARTMENT ENCOUNTER      Pt Name: Rahat Son  SVV:[id number]  [place] 1980  Date of evaluation: 5/18/2020  Provider: NILES Trammell CNP      Chief Complaint:    Chief Complaint   Patient presents with    Flank Pain     Rt sided flank pain started two days ago. Nursing Notes, Past Medical Hx, Past Surgical Hx, Social Hx, Allergies, and Family Hx were all reviewed and agreed with or any disagreements were addressed in the HPI.    HPI:  (Location, Duration, Timing, Severity, Quality, Assoc Sx, Context, Modifying factors)  This is a  37 y.o. female who presents emergency department right flank pain that wraps around to the abdomen, states she is had it for the past 2 days however it started getting very severe today and she started having a lot of nausea but no vomiting. Patient does have a history of kidney stones however she is not had one in approximately 3 or 4 years. Rates the pain a 7 out of 10. Denies any cough, congestion, fever or chills. No chest pain put of chest pain or shortness of breath. No urinary symptoms or vaginal bleeding or discharge. She is not taken anything for pain, no additional complaints, no additional aggravating relieving factors. She presents awake and alert however she appears acutely uncomfortable but in no acute respiratory distress or toxic appearance. PastMedical/Surgical History:      Diagnosis Date    Acute ischemic colitis ([address] 10/20/15    Anesthesia complication     wakes from anesthesia with migraine    Asthma     Had real bad as child and then grew out of it and  then got pregnant it started acting up again.     Bipolar 1 disorder ([address]     Bipolar affective disorder, current episode depressed ([address] 5/28/2015    Depression     Kidney stones     has had multiple kidney stones    Migraine     Nodule of left lung     pt has non calcified URINALYSIS - Abnormal; Notable for the following components:       Result Value    Blood, Urine LARGE (*)     All other components within normal limits    Narrative:     Performed at:  [place] [address] [place]   Phone [phone]   COMPREHENSIVE METABOLIC PANEL - Abnormal; Notable for the following components:    Glucose 109 (*)     All other components within normal limits    Narrative:     Performed at:  [place] [address] [address]   Phone [phone]   MICROSCOPIC URINALYSIS - Abnormal; Notable for the following components:    RBC, UA 21-50 (*)     Bacteria, UA 3+ (*)     All other components within normal limits    Narrative:     Performed at:  [place] [address] [address]   Phone [phone]   LIPASE - Abnormal; Notable for the following components:    Lipase 82.0 (*)     All other components within normal limits    Narrative:     Performed at:  [place] [address] [address]   Phone [phone]   CBC WITH AUTO DIFFERENTIAL    Narrative:     Performed at:  [place] [address] [address]   Phone [phone] of labs reviewed and werenegative at this time or not returned at the time of this note. RADIOLOGY:   Non-plain film images such as CT, Ultrasound and MRI are read by the radiologist. Betty ABRAMS APRN - CNP have directly visualized the radiologic plain film image(s) with the below findings:        Interpretation per the Radiologist below, if available at the time of this note:    CT ABDOMEN PELVIS WO CONTRAST Additional Contrast? None   Preliminary Result   1. Bilateral nonobstructing nephrolithiasis.   Stable left kidney superior   pole 1.6 cm staghorn calculus with focal obstruction DISCONTINUED MEDICATIONS:  Discontinued Medications    BUSPIRONE (BUSPAR) 10 MG TABLET    Take by mouth              (Please note the MDM and HPI sections of this note were completed with a voice recognition program.  Efforts were made to edit the dictations but occasionally words are mis-transcribed.)    Electronically signed, NILES Boswell - JOSE,           NILES Boswell CNP  05/18/20 5879

## 2020-05-18 NOTE — ED NOTES
Discharge instructions reviewed without questions. No further needs voiced at this time. Ambulatory from department in stable condition.        Courtney Marti RN  05/18/20 1192

## 2020-05-19 ENCOUNTER — CARE COORDINATION (OUTPATIENT)
Dept: CARE COORDINATION | Age: 40
End: 2020-05-19

## 2020-05-28 ENCOUNTER — HOSPITAL ENCOUNTER (EMERGENCY)
Age: 40
Discharge: HOME | End: 2020-05-28
Attending: EMERGENCY MEDICINE
Payer: COMMERCIAL

## 2020-05-28 ENCOUNTER — APPOINTMENT (OUTPATIENT)
Dept: CT IMAGING | Age: 40
End: 2020-05-28
Payer: COMMERCIAL

## 2020-05-28 VITALS
BODY MASS INDEX: 33.04 KG/M2 | HEART RATE: 85 BPM | RESPIRATION RATE: 16 BRPM | WEIGHT: 175 LBS | TEMPERATURE: 97.8 F | SYSTOLIC BLOOD PRESSURE: 113 MMHG | HEIGHT: 61 IN | OXYGEN SATURATION: 98 % | DIASTOLIC BLOOD PRESSURE: 69 MMHG

## 2020-05-28 LAB
ANION GAP SERPL CALCULATED.3IONS-SCNC: 10 MMOL/L (ref 3–16)
BACTERIA: ABNORMAL /HPF
BASOPHILS ABSOLUTE: 0.1 K/UL (ref 0–0.2)
BASOPHILS RELATIVE PERCENT: 1 %
BILIRUBIN URINE: NEGATIVE
BLOOD, URINE: ABNORMAL
BUN BLDV-MCNC: 20 MG/DL (ref 7–20)
CALCIUM SERPL-MCNC: 9.8 MG/DL (ref 8.3–10.6)
CHLORIDE BLD-SCNC: 101 MMOL/L (ref 99–110)
CLARITY: CLEAR
CO2: 27 MMOL/L (ref 21–32)
COLOR: YELLOW
CREAT SERPL-MCNC: 0.7 MG/DL (ref 0.6–1.1)
EOSINOPHILS ABSOLUTE: 0.2 K/UL (ref 0–0.6)
EOSINOPHILS RELATIVE PERCENT: 2.3 %
EPITHELIAL CELLS, UA: ABNORMAL /HPF (ref 0–5)
GFR AFRICAN AMERICAN: >60
GFR NON-AFRICAN AMERICAN: >60
GLUCOSE BLD-MCNC: 117 MG/DL (ref 70–99)
GLUCOSE URINE: NEGATIVE MG/DL
HCG(URINE) PREGNANCY TEST: NEGATIVE
HCT VFR BLD CALC: 40.7 % (ref 36–48)
HEMOGLOBIN: 13.8 G/DL (ref 12–16)
KETONES, URINE: NEGATIVE MG/DL
LACTIC ACID: 0.8 MMOL/L (ref 0.4–2)
LEUKOCYTE ESTERASE, URINE: ABNORMAL
LYMPHOCYTES ABSOLUTE: 1.6 K/UL (ref 1–5.1)
LYMPHOCYTES RELATIVE PERCENT: 16.8 %
MCH RBC QN AUTO: 33.3 PG (ref 26–34)
MCHC RBC AUTO-ENTMCNC: 33.9 G/DL (ref 31–36)
MCV RBC AUTO: 98.2 FL (ref 80–100)
MICROSCOPIC EXAMINATION: YES
MONOCYTES ABSOLUTE: 0.4 K/UL (ref 0–1.3)
MONOCYTES RELATIVE PERCENT: 4.3 %
NEUTROPHILS ABSOLUTE: 7.4 K/UL (ref 1.7–7.7)
NEUTROPHILS RELATIVE PERCENT: 75.6 %
NITRITE, URINE: NEGATIVE
PDW BLD-RTO: 14.4 % (ref 12.4–15.4)
PH UA: 6 (ref 5–8)
PLATELET # BLD: 187 K/UL (ref 135–450)
PMV BLD AUTO: 8.5 FL (ref 5–10.5)
POTASSIUM SERPL-SCNC: 3.7 MMOL/L (ref 3.5–5.1)
PROTEIN UA: NEGATIVE MG/DL
RBC # BLD: 4.15 M/UL (ref 4–5.2)
RBC UA: ABNORMAL /HPF (ref 0–4)
SODIUM BLD-SCNC: 138 MMOL/L (ref 136–145)
SPECIFIC GRAVITY UA: 1.02 (ref 1–1.03)
URINE REFLEX TO CULTURE: ABNORMAL
URINE TYPE: ABNORMAL
UROBILINOGEN, URINE: 0.2 E.U./DL
WBC # BLD: 9.8 K/UL (ref 4–11)
WBC UA: ABNORMAL /HPF (ref 0–5)

## 2020-05-28 PROCEDURE — U0002 COVID-19 LAB TEST NON-CDC: HCPCS

## 2020-05-28 PROCEDURE — 84703 CHORIONIC GONADOTROPIN ASSAY: CPT

## 2020-05-28 PROCEDURE — 2580000003 HC RX 258: Performed by: EMERGENCY MEDICINE

## 2020-05-28 PROCEDURE — U0003 INFECTIOUS AGENT DETECTION BY NUCLEIC ACID (DNA OR RNA); SEVERE ACUTE RESPIRATORY SYNDROME CORONAVIRUS 2 (SARS-COV-2) (CORONAVIRUS DISEASE [COVID-19]), AMPLIFIED PROBE TECHNIQUE, MAKING USE OF HIGH THROUGHPUT TECHNOLOGIES AS DESCRIBED BY CMS-2020-01-R: HCPCS

## 2020-05-28 PROCEDURE — 96375 TX/PRO/DX INJ NEW DRUG ADDON: CPT

## 2020-05-28 PROCEDURE — 83605 ASSAY OF LACTIC ACID: CPT

## 2020-05-28 PROCEDURE — 99284 EMERGENCY DEPT VISIT MOD MDM: CPT

## 2020-05-28 PROCEDURE — 80048 BASIC METABOLIC PNL TOTAL CA: CPT

## 2020-05-28 PROCEDURE — 74176 CT ABD & PELVIS W/O CONTRAST: CPT

## 2020-05-28 PROCEDURE — 81001 URINALYSIS AUTO W/SCOPE: CPT

## 2020-05-28 PROCEDURE — 85025 COMPLETE CBC W/AUTO DIFF WBC: CPT

## 2020-05-28 PROCEDURE — 96365 THER/PROPH/DIAG IV INF INIT: CPT

## 2020-05-28 PROCEDURE — 6360000002 HC RX W HCPCS: Performed by: EMERGENCY MEDICINE

## 2020-05-28 RX ORDER — 0.9 % SODIUM CHLORIDE 0.9 %
1000 INTRAVENOUS SOLUTION INTRAVENOUS ONCE
Status: COMPLETED | OUTPATIENT
Start: 2020-05-28 | End: 2020-05-28

## 2020-05-28 RX ORDER — KETOROLAC TROMETHAMINE 30 MG/ML
15 INJECTION, SOLUTION INTRAMUSCULAR; INTRAVENOUS ONCE
Status: COMPLETED | OUTPATIENT
Start: 2020-05-28 | End: 2020-05-28

## 2020-05-28 RX ORDER — MORPHINE SULFATE 4 MG/ML
4 INJECTION, SOLUTION INTRAMUSCULAR; INTRAVENOUS ONCE
Status: COMPLETED | OUTPATIENT
Start: 2020-05-28 | End: 2020-05-28

## 2020-05-28 RX ORDER — ONDANSETRON 2 MG/ML
4 INJECTION INTRAMUSCULAR; INTRAVENOUS ONCE
Status: COMPLETED | OUTPATIENT
Start: 2020-05-28 | End: 2020-05-28

## 2020-05-28 RX ADMIN — KETOROLAC TROMETHAMINE 15 MG: 30 INJECTION, SOLUTION INTRAMUSCULAR at 11:49

## 2020-05-28 RX ADMIN — SODIUM CHLORIDE 1000 ML: 9 INJECTION, SOLUTION INTRAVENOUS at 11:51

## 2020-05-28 RX ADMIN — ONDANSETRON 4 MG: 2 INJECTION INTRAMUSCULAR; INTRAVENOUS at 11:49

## 2020-05-28 RX ADMIN — CEFTRIAXONE SODIUM 1 G: 1 INJECTION, POWDER, FOR SOLUTION INTRAMUSCULAR; INTRAVENOUS at 12:11

## 2020-05-28 RX ADMIN — MORPHINE SULFATE 4 MG: 4 INJECTION, SOLUTION INTRAMUSCULAR; INTRAVENOUS at 11:49

## 2020-05-28 ASSESSMENT — ENCOUNTER SYMPTOMS
SHORTNESS OF BREATH: 0
COLOR CHANGE: 0
NAUSEA: 0
TROUBLE SWALLOWING: 0
ABDOMINAL PAIN: 0
WHEEZING: 0
FACIAL SWELLING: 0
STRIDOR: 0
VOMITING: 0
VOICE CHANGE: 0

## 2020-05-28 ASSESSMENT — PAIN DESCRIPTION - ORIENTATION: ORIENTATION: RIGHT;LEFT

## 2020-05-28 ASSESSMENT — PAIN DESCRIPTION - DESCRIPTORS: DESCRIPTORS: CONSTANT

## 2020-05-28 ASSESSMENT — PAIN SCALES - GENERAL: PAINLEVEL_OUTOF10: 6

## 2020-05-28 ASSESSMENT — PAIN DESCRIPTION - LOCATION: LOCATION: ABDOMEN;FLANK

## 2020-05-28 NOTE — ED NOTES
Pt is no longer in room. Assumed to have eloped. This rn checked in twice, and dr. Velma Murcia came out of room, checking again. Pt not in room. She was instructed to wait to see the Doctor.      Ela Martinez RN  05/28/20 1281

## 2020-05-28 NOTE — ED PROVIDER NOTES
[facility]  ED  eMERGENCY dEPARTMENT eNCOUnter      Pt Name: Charisma Lopez  MRN: [id number]  Armstrongfhomer 1980  Date of evaluation: 5/28/2020  Provider: Marta Wills MD    [address] [place] [address]       Chief Complaint   Patient presents with    Flank Pain     left side flank pain and RLQ abdominal pain. states she's had kidney stones and had an appt scheduled for Tuesday but reports they \"wont see me because I have a fever\"     Abdominal Pain         HISTORY OF PRESENT ILLNESS   (Location/Symptom, Timing/Onset, Context/Setting, Quality, Duration, Modifying Factors, Severity)  Note limiting factors. Charisma Lopez is a 37 y.o. female with hx of kidney stones who presents with 2 weeks of right flank pain which is sharp, stabbing, intermittent, and waxes and wanes. Reports urination worsens the pain and nothing improves it. She also reports a subjective fever. Of note she was a patient in this emergency department 10 days ago and had a CT showing bilateral nonobstructing renal calculi but no obstructing calculi or acute process. She reports since then her pain is been intermittent but generally worsening. She reports she is attempted to be seen by urology however they would not see her because she has a subjective fever. She denies any altered mental status. Denies any falls or trauma. Reports her pain is severe, intermittent, and waxes and wanes. HPI    Nursing Notes were reviewed. REVIEW OFSYSTEMS    (2-9 systems for level 4, 10 or more for level 5)     Review of Systems   Constitutional: Negative for appetite change, fever and unexpected weight change. HENT: Negative for facial swelling, trouble swallowing and voice change. Eyes: Negative for visual disturbance. Respiratory: Negative for shortness of breath, wheezing and stridor. Cardiovascular: Negative for chest pain and palpitations. Gastrointestinal: Negative for abdominal pain, nausea and vomiting. 20 MG CAPSULE    Take 40 mg by mouth daily    FLUTICASONE (FLOVENT HFA) 44 MCG/ACT INHALER    Inhale 2 puffs into the lungs 2 times daily Please use with spacer - ok to substitute to equivalent. ONDANSETRON (ZOFRAN ODT) 4 MG DISINTEGRATING TABLET    Take 1 tablet by mouth every 8 hours as needed for Nausea    RISPERIDONE (RISPERDAL) 2 MG TABLET    Take 2 mg by mouth nightly    TAMSULOSIN (FLOMAX) 0.4 MG CAPSULE    Take 1 capsule by mouth daily for 5 doses       ALLERGIES     Patient has no known allergies. FAMILY HISTORY       Family History   Problem Relation Age of Onset    High Blood Pressure Father     Kidney Disease Father     Cancer Maternal Grandmother     Cancer Maternal Grandfather     Kidney Disease Maternal Grandfather     Cancer Paternal Grandmother     Cancer Paternal Grandfather     Cancer Other 3        ALL    Diabetes Maternal Uncle     COPD Mother     Depression Sister     Ovarian Cancer Sister           SOCIAL HISTORY       Social History     Socioeconomic History    Marital status: Legally      Spouse name: Kriss Boyd Number of children: 3    Years of education: 15    Highest education level: None   Occupational History    Occupation: unemployed   Social Needs    Financial resource strain: None    Food insecurity     Worry: None     Inability: None    Transportation needs     Medical: None     Non-medical: None   Tobacco Use    Smoking status: Current Every Day Smoker     Packs/day: 1.00     Years: 21.00     Pack years: 21.00     Types: Cigarettes     Start date: 4/15/1995    Smokeless tobacco: Never Used   Substance and Sexual Activity    Alcohol use: Not Currently     Comment: quit 2 months ago.      Drug use: Not Currently     Types: Marijuana    Sexual activity: Yes     Partners: Male   Lifestyle    Physical activity     Days per week: None     Minutes per session: None    Stress: None   Relationships    Social connections     Talks on phone: None Gets together: None     Attends Orthodoxy service: None     Active member of club or organization: None     Attends meetings of clubs or organizations: None     Relationship status: None    Intimate partner violence     Fear of current or ex partner: None     Emotionally abused: None     Physically abused: None     Forced sexual activity: None   Other Topics Concern    None   Social History Narrative    None         PHYSICAL EXAM    (up to 7 for level 4, 8 or more for level 5)     ED Triage Vitals [05/28/20 1055]   BP Temp Temp Source Pulse Resp SpO2 Height Weight   134/78 97.8 °F (36.6 °C) Oral 109 20 96 % 5' 1\" (1.549 m) 175 lb (79.4 kg)       Physical Exam  Vitals signs and nursing note reviewed. Constitutional:       Appearance: She is well-developed. She is not diaphoretic. HENT:      Head: Normocephalic and atraumatic. Right Ear: External ear normal.      Left Ear: External ear normal.   Eyes:      Conjunctiva/sclera: Conjunctivae normal.   Neck:      Musculoskeletal: Neck supple. Vascular: No JVD. Trachea: No tracheal deviation. Cardiovascular:      Rate and Rhythm: Normal rate. Pulmonary:      Effort: Pulmonary effort is normal. No respiratory distress. Breath sounds: Normal breath sounds. No wheezing. Abdominal:      General: There is no distension. Palpations: Abdomen is soft. Tenderness: There is no abdominal tenderness. There is right CVA tenderness. There is no guarding or rebound. Comments: Right CVA tenderness to percussion. No anterior abdominal tenderness to palpation. No rebound, guarding, peritoneal signs. Musculoskeletal: Normal range of motion. General: No tenderness or deformity. Skin:     General: Skin is warm and dry. Neurological:      General: No focal deficit present. Mental Status: She is alert and oriented to person, place, and time. Cranial Nerves: No cranial nerve deficit.       Comments: 5/5 strength in all 4 extremities. Normal gait. DIAGNOSTIC RESULTS         RADIOLOGY:     Interpretation per the Radiologist below, if available at the time of this note:    CT ABDOMEN PELVIS WO CONTRAST Additional Contrast? None   Final Result   No evidence of acute process in the abdomen or pelvis. Nephrolithiasis but no   ureteral stone or hydronephrosis/obstructive uropathy. New multifocal ground-glass opacity of the lungs over the past 10 days. This   is nonspecific but could be early changes of Covid related pneumonia. Pneumonitis related to smoking is also in the differential diagnosis. This   critical result was called by Dr. Augustine Man. MD Brendan to [address] [place] [address] on   5/28/2020 at 11:56.                ED BEDSIDE ULTRASOUND:   Performed by ED Physician - none    LABS:  Labs Reviewed   BASIC METABOLIC PANEL - Abnormal; Notable for the following components:       Result Value    Glucose 117 (*)     All other components within normal limits    Narrative:     Performed at:  [place] [address] [facility]   Phone [phone]   URINE RT REFLEX TO CULTURE - Abnormal; Notable for the following components:    Blood, Urine LARGE (*)     Leukocyte Esterase, Urine SMALL (*)     All other components within normal limits    Narrative:     Performed at:  [place] [address] [facility]   Phone [phone]   MICROSCOPIC URINALYSIS - Abnormal; Notable for the following components:    WBC, UA 6-9 (*)     RBC, UA 11-20 (*)     Epithelial Cells, UA 6-10 (*)     Bacteria, UA 1+ (*)     All other components within normal limits    Narrative:     Performed at:  [place] [address] [facility]   Phone [phone]   CBC WITH AUTO DIFFERENTIAL    Narrative:     Performed at:  [place] [address] [facility]   Phone [phone] symptoms. The patient expresses understanding agreement with this plan. Procedures    FINAL IMPRESSION      1. Right flank pain    2. Cough    3. Bacteria in urine          DISPOSITION/PLAN   DISPOSITION Eloped - Left Before Treatment Complete 05/28/2020 01:24:56 PM      (Please note that portions of this note were completed with a voice recognition program.  Efforts were made to edit the dictations but occasionally words aremis-transcribed. )    Franny Fernandez MD (electronically signed)  Attending Emergency Physician           Farnny Fernandez MD  05/28/20 5423

## 2020-05-29 ENCOUNTER — CARE COORDINATION (OUTPATIENT)
Dept: CARE COORDINATION | Age: 40
End: 2020-05-29

## 2020-05-29 LAB — SARS-COV-2, PCR: NOT DETECTED

## 2020-06-01 ENCOUNTER — VIRTUAL VISIT (OUTPATIENT)
Dept: FAMILY MEDICINE CLINIC | Age: 40
End: 2020-06-01
Payer: COMMERCIAL

## 2020-06-01 PROCEDURE — 99213 OFFICE O/P EST LOW 20 MIN: CPT | Performed by: PHYSICIAN ASSISTANT

## 2020-06-01 RX ORDER — ALBUTEROL SULFATE 90 UG/1
2 AEROSOL, METERED RESPIRATORY (INHALATION) EVERY 4 HOURS PRN
Qty: 1 INHALER | Refills: 5 | Status: SHIPPED | OUTPATIENT
Start: 2020-06-01 | End: 2020-09-02 | Stop reason: SDUPTHER

## 2020-06-01 NOTE — PROGRESS NOTES
Inocencio Gandara is a 41 y.o. female evaluated via telephone on 6/1/2020. Attempted video visit, unable to connect. Conducted appt through telephone encounter. Consent:  She and/or health care decision maker is aware that that she may receive a bill for this telephone service, depending on her insurance coverage, and has provided verbal consent to proceed: Yes      Documentation:  I communicated with the patient and/or health care decision maker about ER visit. Details of this discussion including any medical advice provided:     Pt to ER with right flank pain, had been diagnosed with kidney stones on 5/18 and had follow up with urology but apt was cancelled 2/2 to fever. Patient returned to ED on 5/28 with ongoing right flank pain, CT abd/pelvis showed nephrolithiasis but no acute obstructing stones; did show bilateral lower lung opacities concerning for COVID. Patient was swabbed for covid on 5/28 which resulted negative. She maintained O2 sat of 94% while ambulating. She was offered admission but daughter was involved in MVA and patient left. CBC and BMP normal. UA with large blood and small leuk esterase. Culture not collected. She was started on ceftin at discharge. Patient reports that since discharge on day three/10 of ceftin. Fevers have resolved. Patient also notes improvement in right flank pain. Patient does continue to endorse intermittent soa which is not new for her. Recommend repeat cxr in 6 weeks. Continue self isolation until 6/8 in the case of a possible false negative covid screen. Then follow up with urology. Inhaler refilled per pt request. To er with worsening soa, chest tightness. I affirm this is a Patient Initiated Episode with a Patient who has not had a related appointment within my department in the past 7 days or scheduled within the next 24 hours.     Patient identification was verified at the start of the visit: Yes    Total Time: minutes: 5-10 minutes    Note:

## 2020-06-11 ENCOUNTER — CARE COORDINATION (OUTPATIENT)
Dept: CARE COORDINATION | Age: 40
End: 2020-06-11

## 2020-08-12 ENCOUNTER — HOSPITAL ENCOUNTER (OUTPATIENT)
Age: 40
Setting detail: OBSERVATION
Discharge: HOME | End: 2020-08-13
Attending: EMERGENCY MEDICINE | Admitting: INTERNAL MEDICINE
Payer: COMMERCIAL

## 2020-08-12 ENCOUNTER — APPOINTMENT (OUTPATIENT)
Dept: CT IMAGING | Age: 40
End: 2020-08-12
Payer: COMMERCIAL

## 2020-08-12 PROBLEM — N20.0 NEPHROLITHIASIS: Status: ACTIVE | Noted: 2020-08-12

## 2020-08-12 LAB
A/G RATIO: 1.6 (ref 1.1–2.2)
ALBUMIN SERPL-MCNC: 4.2 G/DL (ref 3.4–5)
ALP BLD-CCNC: 92 U/L (ref 40–129)
ALT SERPL-CCNC: 9 U/L (ref 10–40)
AMORPHOUS: ABNORMAL /HPF
ANION GAP SERPL CALCULATED.3IONS-SCNC: 9 MMOL/L (ref 3–16)
AST SERPL-CCNC: 15 U/L (ref 15–37)
BACTERIA: ABNORMAL /HPF
BASOPHILS ABSOLUTE: 0.1 K/UL (ref 0–0.2)
BASOPHILS RELATIVE PERCENT: 1.2 %
BILIRUB SERPL-MCNC: <0.2 MG/DL (ref 0–1)
BILIRUBIN URINE: NEGATIVE
BLOOD, URINE: ABNORMAL
BUN BLDV-MCNC: 13 MG/DL (ref 7–20)
CALCIUM SERPL-MCNC: 9.4 MG/DL (ref 8.3–10.6)
CHLORIDE BLD-SCNC: 102 MMOL/L (ref 99–110)
CLARITY: ABNORMAL
CO2: 28 MMOL/L (ref 21–32)
COLOR: YELLOW
CREAT SERPL-MCNC: 0.9 MG/DL (ref 0.6–1.1)
CRYSTALS, UA: ABNORMAL /HPF
EOSINOPHILS ABSOLUTE: 0.3 K/UL (ref 0–0.6)
EOSINOPHILS RELATIVE PERCENT: 5 %
EPITHELIAL CELLS, UA: ABNORMAL /HPF (ref 0–5)
GFR AFRICAN AMERICAN: >60
GFR NON-AFRICAN AMERICAN: >60
GLOBULIN: 2.6 G/DL
GLUCOSE BLD-MCNC: 90 MG/DL (ref 70–99)
GLUCOSE URINE: NEGATIVE MG/DL
HCT VFR BLD CALC: 43.7 % (ref 36–48)
HEMOGLOBIN: 14.8 G/DL (ref 12–16)
KETONES, URINE: NEGATIVE MG/DL
LEUKOCYTE ESTERASE, URINE: ABNORMAL
LIPASE: 42 U/L (ref 13–60)
LYMPHOCYTES ABSOLUTE: 3.3 K/UL (ref 1–5.1)
LYMPHOCYTES RELATIVE PERCENT: 48.1 %
MCH RBC QN AUTO: 33.8 PG (ref 26–34)
MCHC RBC AUTO-ENTMCNC: 33.9 G/DL (ref 31–36)
MCV RBC AUTO: 99.8 FL (ref 80–100)
MICROSCOPIC EXAMINATION: YES
MONOCYTES ABSOLUTE: 0.4 K/UL (ref 0–1.3)
MONOCYTES RELATIVE PERCENT: 5.3 %
MUCUS: ABNORMAL /LPF
NEUTROPHILS ABSOLUTE: 2.8 K/UL (ref 1.7–7.7)
NEUTROPHILS RELATIVE PERCENT: 40.4 %
NITRITE, URINE: NEGATIVE
PDW BLD-RTO: 13 % (ref 12.4–15.4)
PH UA: 7 (ref 5–8)
PLATELET # BLD: 189 K/UL (ref 135–450)
PMV BLD AUTO: 9.3 FL (ref 5–10.5)
POTASSIUM SERPL-SCNC: 3.9 MMOL/L (ref 3.5–5.1)
PROTEIN UA: NEGATIVE MG/DL
RBC # BLD: 4.38 M/UL (ref 4–5.2)
RBC UA: ABNORMAL /HPF (ref 0–4)
SODIUM BLD-SCNC: 139 MMOL/L (ref 136–145)
SPECIFIC GRAVITY UA: 1.01 (ref 1–1.03)
TOTAL PROTEIN: 6.8 G/DL (ref 6.4–8.2)
URINE TYPE: ABNORMAL
UROBILINOGEN, URINE: 0.2 E.U./DL
WBC # BLD: 6.9 K/UL (ref 4–11)
WBC UA: ABNORMAL /HPF (ref 0–5)

## 2020-08-12 PROCEDURE — 99285 EMERGENCY DEPT VISIT HI MDM: CPT

## 2020-08-12 PROCEDURE — G0378 HOSPITAL OBSERVATION PER HR: HCPCS

## 2020-08-12 PROCEDURE — 2580000003 HC RX 258: Performed by: INTERNAL MEDICINE

## 2020-08-12 PROCEDURE — 85025 COMPLETE CBC W/AUTO DIFF WBC: CPT

## 2020-08-12 PROCEDURE — 74176 CT ABD & PELVIS W/O CONTRAST: CPT

## 2020-08-12 PROCEDURE — 36415 COLL VENOUS BLD VENIPUNCTURE: CPT

## 2020-08-12 PROCEDURE — 2580000003 HC RX 258: Performed by: EMERGENCY MEDICINE

## 2020-08-12 PROCEDURE — 96365 THER/PROPH/DIAG IV INF INIT: CPT

## 2020-08-12 PROCEDURE — 80053 COMPREHEN METABOLIC PANEL: CPT

## 2020-08-12 PROCEDURE — 81001 URINALYSIS AUTO W/SCOPE: CPT

## 2020-08-12 PROCEDURE — 87086 URINE CULTURE/COLONY COUNT: CPT

## 2020-08-12 PROCEDURE — 6370000000 HC RX 637 (ALT 250 FOR IP): Performed by: INTERNAL MEDICINE

## 2020-08-12 PROCEDURE — 96375 TX/PRO/DX INJ NEW DRUG ADDON: CPT

## 2020-08-12 PROCEDURE — 83690 ASSAY OF LIPASE: CPT

## 2020-08-12 PROCEDURE — 6360000002 HC RX W HCPCS: Performed by: EMERGENCY MEDICINE

## 2020-08-12 PROCEDURE — 84703 CHORIONIC GONADOTROPIN ASSAY: CPT

## 2020-08-12 RX ORDER — BUPRENORPHINE HYDROCHLORIDE AND NALOXONE HYDROCHLORIDE DIHYDRATE 8; 2 MG/1; MG/1
1 TABLET SUBLINGUAL DAILY
COMMUNITY

## 2020-08-12 RX ORDER — BUPRENORPHINE AND NALOXONE 8; 2 MG/1; MG/1
1 FILM, SOLUBLE BUCCAL; SUBLINGUAL DAILY
Status: DISCONTINUED | OUTPATIENT
Start: 2020-08-13 | End: 2020-08-12

## 2020-08-12 RX ORDER — ACETAMINOPHEN 650 MG/1
650 SUPPOSITORY RECTAL EVERY 6 HOURS PRN
Status: DISCONTINUED | OUTPATIENT
Start: 2020-08-12 | End: 2020-08-13 | Stop reason: HOSPADM

## 2020-08-12 RX ORDER — SODIUM CHLORIDE 0.9 % (FLUSH) 0.9 %
10 SYRINGE (ML) INJECTION EVERY 12 HOURS SCHEDULED
Status: DISCONTINUED | OUTPATIENT
Start: 2020-08-12 | End: 2020-08-13 | Stop reason: HOSPADM

## 2020-08-12 RX ORDER — SODIUM CHLORIDE 0.9 % (FLUSH) 0.9 %
10 SYRINGE (ML) INJECTION PRN
Status: DISCONTINUED | OUTPATIENT
Start: 2020-08-12 | End: 2020-08-13 | Stop reason: HOSPADM

## 2020-08-12 RX ORDER — ONDANSETRON 2 MG/ML
4 INJECTION INTRAMUSCULAR; INTRAVENOUS EVERY 6 HOURS PRN
Status: DISCONTINUED | OUTPATIENT
Start: 2020-08-12 | End: 2020-08-12

## 2020-08-12 RX ORDER — SODIUM CHLORIDE 9 MG/ML
INJECTION, SOLUTION INTRAVENOUS CONTINUOUS
Status: DISCONTINUED | OUTPATIENT
Start: 2020-08-12 | End: 2020-08-13 | Stop reason: HOSPADM

## 2020-08-12 RX ORDER — RISPERIDONE 2 MG/1
2 TABLET, FILM COATED ORAL NIGHTLY
Status: DISCONTINUED | OUTPATIENT
Start: 2020-08-12 | End: 2020-08-12

## 2020-08-12 RX ORDER — KETOROLAC TROMETHAMINE 30 MG/ML
15 INJECTION, SOLUTION INTRAMUSCULAR; INTRAVENOUS ONCE
Status: COMPLETED | OUTPATIENT
Start: 2020-08-12 | End: 2020-08-12

## 2020-08-12 RX ORDER — KETOROLAC TROMETHAMINE 30 MG/ML
30 INJECTION, SOLUTION INTRAMUSCULAR; INTRAVENOUS EVERY 6 HOURS PRN
Status: DISCONTINUED | OUTPATIENT
Start: 2020-08-12 | End: 2020-08-13 | Stop reason: HOSPADM

## 2020-08-12 RX ORDER — ONDANSETRON 2 MG/ML
4 INJECTION INTRAMUSCULAR; INTRAVENOUS EVERY 6 HOURS PRN
Status: DISCONTINUED | OUTPATIENT
Start: 2020-08-12 | End: 2020-08-13 | Stop reason: HOSPADM

## 2020-08-12 RX ORDER — DIPHENHYDRAMINE HCL 25 MG
25 TABLET ORAL EVERY 6 HOURS PRN
Status: DISCONTINUED | OUTPATIENT
Start: 2020-08-12 | End: 2020-08-13 | Stop reason: HOSPADM

## 2020-08-12 RX ORDER — TAMSULOSIN HYDROCHLORIDE 0.4 MG/1
0.4 CAPSULE ORAL DAILY
Status: DISCONTINUED | OUTPATIENT
Start: 2020-08-13 | End: 2020-08-13 | Stop reason: HOSPADM

## 2020-08-12 RX ORDER — CEFDINIR 300 MG/1
300 CAPSULE ORAL EVERY 12 HOURS SCHEDULED
Status: DISCONTINUED | OUTPATIENT
Start: 2020-08-13 | End: 2020-08-13 | Stop reason: HOSPADM

## 2020-08-12 RX ORDER — PROMETHAZINE HYDROCHLORIDE 25 MG/1
12.5 TABLET ORAL EVERY 6 HOURS PRN
Status: DISCONTINUED | OUTPATIENT
Start: 2020-08-12 | End: 2020-08-13 | Stop reason: HOSPADM

## 2020-08-12 RX ORDER — POLYETHYLENE GLYCOL 3350 17 G/17G
17 POWDER, FOR SOLUTION ORAL DAILY PRN
Status: DISCONTINUED | OUTPATIENT
Start: 2020-08-12 | End: 2020-08-13 | Stop reason: HOSPADM

## 2020-08-12 RX ORDER — ACETAMINOPHEN 325 MG/1
650 TABLET ORAL EVERY 6 HOURS PRN
Status: DISCONTINUED | OUTPATIENT
Start: 2020-08-12 | End: 2020-08-13 | Stop reason: HOSPADM

## 2020-08-12 RX ORDER — OXYCODONE HYDROCHLORIDE 5 MG/1
5 TABLET ORAL ONCE
Status: DISCONTINUED | OUTPATIENT
Start: 2020-08-12 | End: 2020-08-12

## 2020-08-12 RX ORDER — FLUOXETINE HYDROCHLORIDE 20 MG/1
40 CAPSULE ORAL DAILY
Status: DISCONTINUED | OUTPATIENT
Start: 2020-08-13 | End: 2020-08-12

## 2020-08-12 RX ORDER — QUETIAPINE 150 MG/1
150 TABLET, FILM COATED, EXTENDED RELEASE ORAL NIGHTLY
COMMUNITY

## 2020-08-12 RX ORDER — FLUTICASONE PROPIONATE 44 UG/1
2 AEROSOL, METERED RESPIRATORY (INHALATION) 2 TIMES DAILY
Status: DISCONTINUED | OUTPATIENT
Start: 2020-08-12 | End: 2020-08-13 | Stop reason: HOSPADM

## 2020-08-12 RX ADMIN — KETOROLAC TROMETHAMINE 15 MG: 30 INJECTION, SOLUTION INTRAMUSCULAR at 21:35

## 2020-08-12 RX ADMIN — ONDANSETRON 4 MG: 2 INJECTION INTRAMUSCULAR; INTRAVENOUS at 21:35

## 2020-08-12 RX ADMIN — DIPHENHYDRAMINE HCL 25 MG: 25 TABLET ORAL at 23:16

## 2020-08-12 RX ADMIN — QUETIAPINE FUMARATE 150 MG: 25 TABLET ORAL at 23:22

## 2020-08-12 RX ADMIN — PROMETHAZINE HYDROCHLORIDE 12.5 MG: 25 TABLET ORAL at 23:16

## 2020-08-12 RX ADMIN — SODIUM CHLORIDE: 9 INJECTION, SOLUTION INTRAVENOUS at 23:16

## 2020-08-12 RX ADMIN — Medication 10 ML: at 23:16

## 2020-08-12 RX ADMIN — CEFTRIAXONE SODIUM 1 G: 1 INJECTION, POWDER, FOR SOLUTION INTRAMUSCULAR; INTRAVENOUS at 21:35

## 2020-08-12 ASSESSMENT — PAIN DESCRIPTION - PAIN TYPE
TYPE: ACUTE PAIN
TYPE: ACUTE PAIN

## 2020-08-12 ASSESSMENT — PAIN SCALES - GENERAL
PAINLEVEL_OUTOF10: 6
PAINLEVEL_OUTOF10: 6

## 2020-08-12 ASSESSMENT — PAIN DESCRIPTION - DESCRIPTORS: DESCRIPTORS: STABBING;RADIATING

## 2020-08-12 ASSESSMENT — PAIN DESCRIPTION - ORIENTATION: ORIENTATION: LEFT

## 2020-08-12 ASSESSMENT — PAIN DESCRIPTION - LOCATION
LOCATION: FLANK
LOCATION: FLANK

## 2020-08-12 NOTE — ED PROVIDER NOTES
sodium chloride flush 0.9 % injection 10 mL  10 mL Intravenous 2 times per day Lavern Cornejo MD   10 mL at 08/12/20 2316    sodium chloride flush 0.9 % injection 10 mL  10 mL Intravenous PRN Lavern Cornejo MD        acetaminophen (TYLENOL) tablet 650 mg  650 mg Oral Q6H PRN Lavern Cornejo MD        Or    acetaminophen (TYLENOL) suppository 650 mg  650 mg Rectal Q6H PRN Lavern Cornejo MD        polyethylene glycol (GLYCOLAX) packet 17 g  17 g Oral Daily PRN Lavern Cornejo MD        promethazine (PHENERGAN) tablet 12.5 mg  12.5 mg Oral Q6H PRN Lavern Cornejo MD   12.5 mg at 08/12/20 2316    Or    ondansetron (ZOFRAN) injection 4 mg  4 mg Intravenous Q6H PRN Lavern Cornejo MD        enoxaparin (LOVENOX) injection 40 mg  40 mg Subcutaneous Daily Lavern Cornejo MD        0.9 % sodium chloride infusion   Intravenous Continuous Lavern Cornejo  mL/hr at 08/13/20 0836      ketorolac (TORADOL) injection 30 mg  30 mg Intravenous Q6H PRN Lavern Cornejo MD   30 mg at 08/13/20 0356    cefdinir (OMNICEF) capsule 300 mg  300 mg Oral 2 times per day Lavern Cornejo MD        fluticasone (FLOVENT HFA) 44 MCG/ACT inhaler 2 puff  2 puff Inhalation BID Lavern Cornejo MD        tamsulosin (FLOMAX) capsule 0.4 mg  0.4 mg Oral Daily Lavern Cornejo MD        QUEtiapine (SEROQUEL) tablet 150 mg  150 mg Oral Nightly Lavern Cornejo MD   150 mg at 08/12/20 2322    diphenhydrAMINE (BENADRYL) tablet 25 mg  25 mg Oral Q6H PRN Lavern Cornejo MD   25 mg at 08/12/20 2316     No Known Allergies    REVIEW OF SYSTEMS  10 systems reviewed, pertinent positives per HPI otherwise noted to be negative. PHYSICAL EXAM  /65   Pulse 76   Temp 97.8 °F (36.6 °C) (Temporal)   Resp 14   Ht 5' 1\" (1.549 m)   Wt 189 lb 11.2 oz (86 kg)   SpO2 94%   BMI 35.84 kg/m²    GENERAL APPEARANCE: Awake and alert. Cooperative. Uncomfortable  HENT: Normocephalic. Atraumatic. CN  2-12 grossly intact.   HEART/CHEST: RRR. No murmurs appreciated   LUNGS: Respirations unlabored. Speaking comfortably in full sentences. CTAB. ABDOMEN: Soft, non-distended abdomen. Left flank pain. No guarding. No rebound. EXTREMITIES: no gross deformities. Moving all extremities. All extremities neurovascularly intact. SKIN: Warm and dry. No acute rashes. NEUROLOGICAL: Alert and oriented. CN's 2-12 intact. No gross facial drooping. Strength 5/5, sensation intact. PSYCHIATRIC: Normal mood and affect. LABS  Results for orders placed or performed during the hospital encounter of 08/12/20   Urinalysis, reflex to microscopic   Result Value Ref Range    Color, UA Yellow Straw/Yellow    Clarity, UA SL CLOUDY (A) Clear    Glucose, Ur Negative Negative mg/dL    Bilirubin Urine Negative Negative    Ketones, Urine Negative Negative mg/dL    Specific Gravity, UA 1.010 1.005 - 1.030    Blood, Urine LARGE (A) Negative    pH, UA 7.0 5.0 - 8.0    Protein, UA Negative Negative mg/dL    Urobilinogen, Urine 0.2 <2.0 E.U./dL    Nitrite, Urine Negative Negative    Leukocyte Esterase, Urine SMALL (A) Negative    Microscopic Examination YES     Urine Type NotGiven    Microscopic Urinalysis   Result Value Ref Range    Mucus, UA 1+ (A) None Seen /LPF    WBC, UA 6-9 (A) 0 - 5 /HPF    RBC, UA  (A) 0 - 4 /HPF    Epithelial Cells, UA 6-10 (A) 0 - 5 /HPF    Bacteria, UA 1+ (A) None Seen /HPF    Amorphous, UA Rare /HPF    Crystals, UA Few Ca.  Oxalate (A) None Seen /HPF   CBC Auto Differential   Result Value Ref Range    WBC 6.9 4.0 - 11.0 K/uL    RBC 4.38 4.00 - 5.20 M/uL    Hemoglobin 14.8 12.0 - 16.0 g/dL    Hematocrit 43.7 36.0 - 48.0 %    MCV 99.8 80.0 - 100.0 fL    MCH 33.8 26.0 - 34.0 pg    MCHC 33.9 31.0 - 36.0 g/dL    RDW 13.0 12.4 - 15.4 %    Platelets 834 797 - 816 K/uL    MPV 9.3 5.0 - 10.5 fL    Neutrophils % 40.4 %    Lymphocytes % 48.1 %    Monocytes % 5.3 %    Eosinophils % 5.0 %    Basophils % 1.2 %    Neutrophils Absolute 2.8 1.7 - 7.7 K/uL Lymphocytes Absolute 3.3 1.0 - 5.1 K/uL    Monocytes Absolute 0.4 0.0 - 1.3 K/uL    Eosinophils Absolute 0.3 0.0 - 0.6 K/uL    Basophils Absolute 0.1 0.0 - 0.2 K/uL   Comprehensive Metabolic Panel   Result Value Ref Range    Sodium 139 136 - 145 mmol/L    Potassium 3.9 3.5 - 5.1 mmol/L    Chloride 102 99 - 110 mmol/L    CO2 28 21 - 32 mmol/L    Anion Gap 9 3 - 16    Glucose 90 70 - 99 mg/dL    BUN 13 7 - 20 mg/dL    CREATININE 0.9 0.6 - 1.1 mg/dL    GFR Non-African American >60 >60    GFR African American >60 >60    Calcium 9.4 8.3 - 10.6 mg/dL    Total Protein 6.8 6.4 - 8.2 g/dL    Alb 4.2 3.4 - 5.0 g/dL    Albumin/Globulin Ratio 1.6 1.1 - 2.2    Total Bilirubin <0.2 0.0 - 1.0 mg/dL    Alkaline Phosphatase 92 40 - 129 U/L    ALT 9 (L) 10 - 40 U/L    AST 15 15 - 37 U/L    Globulin 2.6 g/dL   Lipase   Result Value Ref Range    Lipase 42.0 13.0 - 60.0 U/L   Basic Metabolic Panel w/ Reflex to MG   Result Value Ref Range    Sodium 141 136 - 145 mmol/L    Potassium reflex Magnesium 4.4 3.5 - 5.1 mmol/L    Chloride 105 99 - 110 mmol/L    CO2 28 21 - 32 mmol/L    Anion Gap 8 3 - 16    Glucose 89 70 - 99 mg/dL    BUN 15 7 - 20 mg/dL    CREATININE 1.0 0.6 - 1.1 mg/dL    GFR Non-African American >60 >60    GFR African American >60 >60    Calcium 9.4 8.3 - 10.6 mg/dL   CBC auto differential   Result Value Ref Range    WBC 6.4 4.0 - 11.0 K/uL    RBC 4.14 4.00 - 5.20 M/uL    Hemoglobin 13.9 12.0 - 16.0 g/dL    Hematocrit 41.5 36.0 - 48.0 %    .3 (H) 80.0 - 100.0 fL    MCH 33.6 26.0 - 34.0 pg    MCHC 33.5 31.0 - 36.0 g/dL    RDW 13.1 12.4 - 15.4 %    Platelets 909 289 - 294 K/uL    MPV 9.7 5.0 - 10.5 fL    Neutrophils % 37.8 %    Lymphocytes % 48.1 %    Monocytes % 6.7 %    Eosinophils % 6.5 %    Basophils % 0.9 %    Neutrophils Absolute 2.4 1.7 - 7.7 K/uL    Lymphocytes Absolute 3.1 1.0 - 5.1 K/uL    Monocytes Absolute 0.4 0.0 - 1.3 K/uL    Eosinophils Absolute 0.4 0.0 - 0.6 K/uL    Basophils Absolute 0.1 0.0 - 0.2 K/uL process. 5 mm proximal left ureteral stone causing mild hydronephrosis. ED COURSE/MDM  Patient seen and evaluated. At presentation, patient was awake, alert, afebrile, hemodynamically stable, and satting well on room air. Patient appeared very uncomfortable. Patient given Toradol and oxycodone for pain and Zofran for nausea. Differential diagnosis includes UTI, pyelonephritis, infected kidney stone, ureterolithiasis, among others. Given this, CBC, CMP, urinalysis, urine culture, and CT stone protocol obtained. CT showed 5 mm left ureteral stone with hydronephrosis. These were discussed with patient. Patient reported having history of ureteral stenosis on left and has never been able to pass any stones on the left. Given this, urology consulted. Urology recommended admission and IV antibiotic and they will see the patient in the morning. N.p.o. order for tonight placed. Hospitalist consulted for admission. Admit.      During the patient's ED course, the patient was given:  Medications   sodium chloride flush 0.9 % injection 10 mL (10 mLs Intravenous Given 8/12/20 2316)   sodium chloride flush 0.9 % injection 10 mL (has no administration in time range)   acetaminophen (TYLENOL) tablet 650 mg (has no administration in time range)     Or   acetaminophen (TYLENOL) suppository 650 mg (has no administration in time range)   polyethylene glycol (GLYCOLAX) packet 17 g (has no administration in time range)   promethazine (PHENERGAN) tablet 12.5 mg (12.5 mg Oral Given 8/12/20 2316)     Or   ondansetron (ZOFRAN) injection 4 mg ( Intravenous See Alternative 8/12/20 2316)   enoxaparin (LOVENOX) injection 40 mg (has no administration in time range)   0.9 % sodium chloride infusion ( Intravenous New Bag 8/13/20 2957)   ketorolac (TORADOL) injection 30 mg (30 mg Intravenous Given 8/13/20 0116)   cefdinir (OMNICEF) capsule 300 mg (has no administration in time range)   fluticasone (FLOVENT HFA) 44 MCG/ACT inhaler 2 puff ( Inhalation Canceled Entry 8/13/20 0722)   tamsulosin (FLOMAX) capsule 0.4 mg (has no administration in time range)   QUEtiapine (SEROQUEL) tablet 150 mg (150 mg Oral Given 8/12/20 2322)   diphenhydrAMINE (BENADRYL) tablet 25 mg (25 mg Oral Given 8/12/20 2316)   HYDROmorphone (DILAUDID) injection 0.5 mg (0.5 mg Intravenous Given 8/13/20 0512)   ketorolac (TORADOL) injection 15 mg (15 mg Intravenous Given 8/12/20 2135)   promethazine (PHENERGAN) injection 6.25 mg (6.25 mg Intravenous Given 8/13/20 1142)        CLINICAL IMPRESSION  1. Nephrolithiasis    2. Ureterolithiasis    3. Hydronephrosis concurrent with and due to calculi of kidney and ureter        Blood pressure 112/65, pulse 76, temperature 97.8 °F (36.6 °C), temperature source Temporal, resp. rate 14, height 5' 1\" (1.549 m), weight 189 lb 11.2 oz (86 kg), SpO2 94 %, not currently breastfeeding. DISPOSITION  Carmen Schulz was admitted in stable condition. Patient was given scripts for the following medications. I counseled patient how to take these medications. Current Discharge Medication List      START taking these medications    Details   ciprofloxacin (CIPRO) 250 MG tablet Take 1 tablet by mouth 2 times daily for 5 days  Qty: 10 tablet, Refills: 0      phenazopyridine (PYRIDIUM) 200 MG tablet Take 1 tablet by mouth 3 times daily as needed for Pain  Qty: 15 tablet, Refills: 0             Follow-up with:  Hussain Da Silva, [address] [place] [id number]  [phone]            DISCLAIMER: This chart was created using Dragon dictation software. Efforts were made by me to ensure accuracy, however some errors may be present due to limitations of this technology and occasionally words are not transcribed correctly.         Kj Hernandez MD  08/13/20 4141

## 2020-08-13 ENCOUNTER — ANESTHESIA (OUTPATIENT)
Dept: OPERATING ROOM | Age: 40
End: 2020-08-13
Payer: COMMERCIAL

## 2020-08-13 ENCOUNTER — ANESTHESIA EVENT (OUTPATIENT)
Dept: OPERATING ROOM | Age: 40
End: 2020-08-13
Payer: COMMERCIAL

## 2020-08-13 ENCOUNTER — APPOINTMENT (OUTPATIENT)
Dept: GENERAL RADIOLOGY | Age: 40
End: 2020-08-13
Payer: COMMERCIAL

## 2020-08-13 VITALS
OXYGEN SATURATION: 100 % | RESPIRATION RATE: 9 BRPM | SYSTOLIC BLOOD PRESSURE: 96 MMHG | DIASTOLIC BLOOD PRESSURE: 51 MMHG

## 2020-08-13 VITALS
TEMPERATURE: 97 F | RESPIRATION RATE: 16 BRPM | WEIGHT: 189.7 LBS | SYSTOLIC BLOOD PRESSURE: 108 MMHG | BODY MASS INDEX: 35.82 KG/M2 | HEIGHT: 61 IN | HEART RATE: 66 BPM | OXYGEN SATURATION: 95 % | DIASTOLIC BLOOD PRESSURE: 52 MMHG

## 2020-08-13 LAB
ANION GAP SERPL CALCULATED.3IONS-SCNC: 8 MMOL/L (ref 3–16)
BASOPHILS ABSOLUTE: 0.1 K/UL (ref 0–0.2)
BASOPHILS RELATIVE PERCENT: 0.9 %
BUN BLDV-MCNC: 15 MG/DL (ref 7–20)
CALCIUM SERPL-MCNC: 9.4 MG/DL (ref 8.3–10.6)
CHLORIDE BLD-SCNC: 105 MMOL/L (ref 99–110)
CO2: 28 MMOL/L (ref 21–32)
CREAT SERPL-MCNC: 1 MG/DL (ref 0.6–1.1)
EOSINOPHILS ABSOLUTE: 0.4 K/UL (ref 0–0.6)
EOSINOPHILS RELATIVE PERCENT: 6.5 %
GFR AFRICAN AMERICAN: >60
GFR NON-AFRICAN AMERICAN: >60
GLUCOSE BLD-MCNC: 89 MG/DL (ref 70–99)
HCG QUALITATIVE: NEGATIVE
HCT VFR BLD CALC: 41.5 % (ref 36–48)
HEMOGLOBIN: 13.9 G/DL (ref 12–16)
LYMPHOCYTES ABSOLUTE: 3.1 K/UL (ref 1–5.1)
LYMPHOCYTES RELATIVE PERCENT: 48.1 %
MCH RBC QN AUTO: 33.6 PG (ref 26–34)
MCHC RBC AUTO-ENTMCNC: 33.5 G/DL (ref 31–36)
MCV RBC AUTO: 100.3 FL (ref 80–100)
MONOCYTES ABSOLUTE: 0.4 K/UL (ref 0–1.3)
MONOCYTES RELATIVE PERCENT: 6.7 %
NEUTROPHILS ABSOLUTE: 2.4 K/UL (ref 1.7–7.7)
NEUTROPHILS RELATIVE PERCENT: 37.8 %
PDW BLD-RTO: 13.1 % (ref 12.4–15.4)
PLATELET # BLD: 170 K/UL (ref 135–450)
PMV BLD AUTO: 9.7 FL (ref 5–10.5)
POTASSIUM REFLEX MAGNESIUM: 4.4 MMOL/L (ref 3.5–5.1)
RBC # BLD: 4.14 M/UL (ref 4–5.2)
SODIUM BLD-SCNC: 141 MMOL/L (ref 136–145)
URINE CULTURE, ROUTINE: NORMAL
WBC # BLD: 6.4 K/UL (ref 4–11)

## 2020-08-13 PROCEDURE — 6360000002 HC RX W HCPCS

## 2020-08-13 PROCEDURE — 6370000000 HC RX 637 (ALT 250 FOR IP): Performed by: NURSE ANESTHETIST, CERTIFIED REGISTERED

## 2020-08-13 PROCEDURE — 6360000002 HC RX W HCPCS: Performed by: NURSE ANESTHETIST, CERTIFIED REGISTERED

## 2020-08-13 PROCEDURE — 6360000002 HC RX W HCPCS: Performed by: ANESTHESIOLOGY

## 2020-08-13 PROCEDURE — 36415 COLL VENOUS BLD VENIPUNCTURE: CPT

## 2020-08-13 PROCEDURE — 2709999900 HC NON-CHARGEABLE SUPPLY: Performed by: UROLOGY

## 2020-08-13 PROCEDURE — 3700000001 HC ADD 15 MINUTES (ANESTHESIA): Performed by: UROLOGY

## 2020-08-13 PROCEDURE — 2580000003 HC RX 258: Performed by: INTERNAL MEDICINE

## 2020-08-13 PROCEDURE — 2500000003 HC RX 250 WO HCPCS: Performed by: INTERNAL MEDICINE

## 2020-08-13 PROCEDURE — 88300 SURGICAL PATH GROSS: CPT

## 2020-08-13 PROCEDURE — 96376 TX/PRO/DX INJ SAME DRUG ADON: CPT

## 2020-08-13 PROCEDURE — 2500000003 HC RX 250 WO HCPCS: Performed by: NURSE ANESTHETIST, CERTIFIED REGISTERED

## 2020-08-13 PROCEDURE — 6360000004 HC RX CONTRAST MEDICATION: Performed by: UROLOGY

## 2020-08-13 PROCEDURE — C1758 CATHETER, URETERAL: HCPCS | Performed by: UROLOGY

## 2020-08-13 PROCEDURE — 99217 PR OBSERVATION CARE DISCHARGE MANAGEMENT: CPT | Performed by: INTERNAL MEDICINE

## 2020-08-13 PROCEDURE — 2580000003 HC RX 258: Performed by: UROLOGY

## 2020-08-13 PROCEDURE — 7100000001 HC PACU RECOVERY - ADDTL 15 MIN: Performed by: UROLOGY

## 2020-08-13 PROCEDURE — 7100000000 HC PACU RECOVERY - FIRST 15 MIN: Performed by: UROLOGY

## 2020-08-13 PROCEDURE — 6370000000 HC RX 637 (ALT 250 FOR IP): Performed by: UROLOGY

## 2020-08-13 PROCEDURE — 85025 COMPLETE CBC W/AUTO DIFF WBC: CPT

## 2020-08-13 PROCEDURE — 3600000004 HC SURGERY LEVEL 4 BASE: Performed by: UROLOGY

## 2020-08-13 PROCEDURE — G0378 HOSPITAL OBSERVATION PER HR: HCPCS

## 2020-08-13 PROCEDURE — 3600000014 HC SURGERY LEVEL 4 ADDTL 15MIN: Performed by: UROLOGY

## 2020-08-13 PROCEDURE — 82365 CALCULUS SPECTROSCOPY: CPT

## 2020-08-13 PROCEDURE — 80048 BASIC METABOLIC PNL TOTAL CA: CPT

## 2020-08-13 PROCEDURE — 2720000010 HC SURG SUPPLY STERILE: Performed by: UROLOGY

## 2020-08-13 PROCEDURE — 76000 FLUOROSCOPY <1 HR PHYS/QHP: CPT

## 2020-08-13 PROCEDURE — C1769 GUIDE WIRE: HCPCS | Performed by: UROLOGY

## 2020-08-13 PROCEDURE — 96375 TX/PRO/DX INJ NEW DRUG ADDON: CPT

## 2020-08-13 PROCEDURE — 3700000000 HC ANESTHESIA ATTENDED CARE: Performed by: UROLOGY

## 2020-08-13 PROCEDURE — 2580000003 HC RX 258: Performed by: NURSE ANESTHETIST, CERTIFIED REGISTERED

## 2020-08-13 PROCEDURE — 6360000002 HC RX W HCPCS: Performed by: INTERNAL MEDICINE

## 2020-08-13 RX ORDER — SODIUM CHLORIDE 9 MG/ML
INJECTION, SOLUTION INTRAVENOUS CONTINUOUS PRN
Status: DISCONTINUED | OUTPATIENT
Start: 2020-08-13 | End: 2020-08-13 | Stop reason: SDUPTHER

## 2020-08-13 RX ORDER — ROCURONIUM BROMIDE 10 MG/ML
INJECTION, SOLUTION INTRAVENOUS PRN
Status: DISCONTINUED | OUTPATIENT
Start: 2020-08-13 | End: 2020-08-13 | Stop reason: SDUPTHER

## 2020-08-13 RX ORDER — LIDOCAINE HYDROCHLORIDE 20 MG/ML
JELLY TOPICAL PRN
Status: DISCONTINUED | OUTPATIENT
Start: 2020-08-13 | End: 2020-08-13 | Stop reason: ALTCHOICE

## 2020-08-13 RX ORDER — PHENAZOPYRIDINE HYDROCHLORIDE 200 MG/1
200 TABLET, FILM COATED ORAL 3 TIMES DAILY PRN
Qty: 15 TABLET | Refills: 0 | Status: SHIPPED | OUTPATIENT
Start: 2020-08-13 | End: 2020-08-18

## 2020-08-13 RX ORDER — LIDOCAINE HYDROCHLORIDE 20 MG/ML
INJECTION, SOLUTION INFILTRATION; PERINEURAL PRN
Status: DISCONTINUED | OUTPATIENT
Start: 2020-08-13 | End: 2020-08-13 | Stop reason: SDUPTHER

## 2020-08-13 RX ORDER — SODIUM CHLORIDE, SODIUM LACTATE, POTASSIUM CHLORIDE, CALCIUM CHLORIDE 600; 310; 30; 20 MG/100ML; MG/100ML; MG/100ML; MG/100ML
INJECTION, SOLUTION INTRAVENOUS CONTINUOUS
Status: CANCELLED | OUTPATIENT
Start: 2020-08-13

## 2020-08-13 RX ORDER — ONDANSETRON 2 MG/ML
INJECTION INTRAMUSCULAR; INTRAVENOUS PRN
Status: DISCONTINUED | OUTPATIENT
Start: 2020-08-13 | End: 2020-08-13 | Stop reason: SDUPTHER

## 2020-08-13 RX ORDER — MORPHINE SULFATE 10 MG/ML
1 INJECTION, SOLUTION INTRAMUSCULAR; INTRAVENOUS EVERY 5 MIN PRN
Status: DISCONTINUED | OUTPATIENT
Start: 2020-08-13 | End: 2020-08-13 | Stop reason: HOSPADM

## 2020-08-13 RX ORDER — PROMETHAZINE HYDROCHLORIDE 25 MG/ML
INJECTION, SOLUTION INTRAMUSCULAR; INTRAVENOUS
Status: COMPLETED
Start: 2020-08-13 | End: 2020-08-13

## 2020-08-13 RX ORDER — PROMETHAZINE HYDROCHLORIDE 25 MG/ML
6.25 INJECTION, SOLUTION INTRAMUSCULAR; INTRAVENOUS ONCE
Status: COMPLETED | OUTPATIENT
Start: 2020-08-13 | End: 2020-08-13

## 2020-08-13 RX ORDER — MORPHINE SULFATE 10 MG/ML
2 INJECTION, SOLUTION INTRAMUSCULAR; INTRAVENOUS EVERY 5 MIN PRN
Status: DISCONTINUED | OUTPATIENT
Start: 2020-08-13 | End: 2020-08-13 | Stop reason: HOSPADM

## 2020-08-13 RX ORDER — FENTANYL CITRATE 50 UG/ML
INJECTION, SOLUTION INTRAMUSCULAR; INTRAVENOUS PRN
Status: DISCONTINUED | OUTPATIENT
Start: 2020-08-13 | End: 2020-08-13 | Stop reason: SDUPTHER

## 2020-08-13 RX ORDER — ONDANSETRON 2 MG/ML
4 INJECTION INTRAMUSCULAR; INTRAVENOUS
Status: DISCONTINUED | OUTPATIENT
Start: 2020-08-13 | End: 2020-08-13 | Stop reason: HOSPADM

## 2020-08-13 RX ORDER — SODIUM CHLORIDE 0.9 % (FLUSH) 0.9 %
10 SYRINGE (ML) INJECTION PRN
Status: CANCELLED | OUTPATIENT
Start: 2020-08-13

## 2020-08-13 RX ORDER — MAGNESIUM HYDROXIDE 1200 MG/15ML
LIQUID ORAL PRN
Status: DISCONTINUED | OUTPATIENT
Start: 2020-08-13 | End: 2020-08-13 | Stop reason: ALTCHOICE

## 2020-08-13 RX ORDER — DEXAMETHASONE SODIUM PHOSPHATE 4 MG/ML
INJECTION, SOLUTION INTRA-ARTICULAR; INTRALESIONAL; INTRAMUSCULAR; INTRAVENOUS; SOFT TISSUE PRN
Status: DISCONTINUED | OUTPATIENT
Start: 2020-08-13 | End: 2020-08-13 | Stop reason: SDUPTHER

## 2020-08-13 RX ORDER — SUCCINYLCHOLINE CHLORIDE 20 MG/ML
INJECTION INTRAMUSCULAR; INTRAVENOUS PRN
Status: DISCONTINUED | OUTPATIENT
Start: 2020-08-13 | End: 2020-08-13 | Stop reason: SDUPTHER

## 2020-08-13 RX ORDER — LIDOCAINE HYDROCHLORIDE 40 MG/ML
SOLUTION TOPICAL PRN
Status: DISCONTINUED | OUTPATIENT
Start: 2020-08-13 | End: 2020-08-13 | Stop reason: SDUPTHER

## 2020-08-13 RX ORDER — CIPROFLOXACIN 250 MG/1
250 TABLET, FILM COATED ORAL 2 TIMES DAILY
Qty: 10 TABLET | Refills: 0 | Status: SHIPPED | OUTPATIENT
Start: 2020-08-13 | End: 2020-08-18

## 2020-08-13 RX ORDER — OXYCODONE HYDROCHLORIDE AND ACETAMINOPHEN 5; 325 MG/1; MG/1
2 TABLET ORAL PRN
Status: DISCONTINUED | OUTPATIENT
Start: 2020-08-13 | End: 2020-08-13 | Stop reason: HOSPADM

## 2020-08-13 RX ORDER — PROPOFOL 10 MG/ML
INJECTION, EMULSION INTRAVENOUS PRN
Status: DISCONTINUED | OUTPATIENT
Start: 2020-08-13 | End: 2020-08-13 | Stop reason: SDUPTHER

## 2020-08-13 RX ORDER — MEPERIDINE HYDROCHLORIDE 25 MG/ML
12.5 INJECTION INTRAMUSCULAR; INTRAVENOUS; SUBCUTANEOUS EVERY 5 MIN PRN
Status: DISCONTINUED | OUTPATIENT
Start: 2020-08-13 | End: 2020-08-13 | Stop reason: HOSPADM

## 2020-08-13 RX ORDER — OXYCODONE HYDROCHLORIDE AND ACETAMINOPHEN 5; 325 MG/1; MG/1
1 TABLET ORAL PRN
Status: DISCONTINUED | OUTPATIENT
Start: 2020-08-13 | End: 2020-08-13 | Stop reason: HOSPADM

## 2020-08-13 RX ORDER — SODIUM CHLORIDE 0.9 % (FLUSH) 0.9 %
10 SYRINGE (ML) INJECTION EVERY 12 HOURS SCHEDULED
Status: CANCELLED | OUTPATIENT
Start: 2020-08-13

## 2020-08-13 RX ADMIN — LIDOCAINE HYDROCHLORIDE 4 ML: 40 SPRAY LARYNGEAL; TRANSTRACHEAL at 10:40

## 2020-08-13 RX ADMIN — PROPOFOL 200 MG: 10 INJECTION, EMULSION INTRAVENOUS at 10:39

## 2020-08-13 RX ADMIN — SUCCINYLCHOLINE CHLORIDE 120 MG: 20 INJECTION, SOLUTION INTRAMUSCULAR; INTRAVENOUS at 10:39

## 2020-08-13 RX ADMIN — DEXAMETHASONE SODIUM PHOSPHATE 8 MG: 4 INJECTION, SOLUTION INTRAMUSCULAR; INTRAVENOUS at 10:45

## 2020-08-13 RX ADMIN — SODIUM CHLORIDE: 9 INJECTION, SOLUTION INTRAVENOUS at 03:56

## 2020-08-13 RX ADMIN — ROCURONIUM BROMIDE 10 MG: 10 INJECTION, SOLUTION INTRAVENOUS at 10:39

## 2020-08-13 RX ADMIN — KETOROLAC TROMETHAMINE 30 MG: 30 INJECTION, SOLUTION INTRAMUSCULAR at 03:56

## 2020-08-13 RX ADMIN — SUGAMMADEX 200 MG: 100 INJECTION, SOLUTION INTRAVENOUS at 10:56

## 2020-08-13 RX ADMIN — ROCURONIUM BROMIDE 30 MG: 10 INJECTION, SOLUTION INTRAVENOUS at 10:44

## 2020-08-13 RX ADMIN — SODIUM CHLORIDE: 9 INJECTION, SOLUTION INTRAVENOUS at 10:34

## 2020-08-13 RX ADMIN — LIDOCAINE HYDROCHLORIDE 50 MG: 20 INJECTION, SOLUTION INFILTRATION; PERINEURAL at 10:39

## 2020-08-13 RX ADMIN — SODIUM CHLORIDE: 9 INJECTION, SOLUTION INTRAVENOUS at 10:54

## 2020-08-13 RX ADMIN — HYDROMORPHONE HYDROCHLORIDE 0.5 MG: 1 INJECTION, SOLUTION INTRAMUSCULAR; INTRAVENOUS; SUBCUTANEOUS at 05:12

## 2020-08-13 RX ADMIN — ONDANSETRON 4 MG: 2 INJECTION, SOLUTION INTRAMUSCULAR; INTRAVENOUS at 10:45

## 2020-08-13 RX ADMIN — SODIUM CHLORIDE: 9 INJECTION, SOLUTION INTRAVENOUS at 08:36

## 2020-08-13 RX ADMIN — PROMETHAZINE HYDROCHLORIDE 6.25 MG: 25 INJECTION, SOLUTION INTRAMUSCULAR; INTRAVENOUS at 11:42

## 2020-08-13 RX ADMIN — PROMETHAZINE HYDROCHLORIDE 6.25 MG: 25 INJECTION INTRAMUSCULAR; INTRAVENOUS at 11:42

## 2020-08-13 RX ADMIN — HYDROMORPHONE HYDROCHLORIDE 0.5 MG: 1 INJECTION, SOLUTION INTRAMUSCULAR; INTRAVENOUS; SUBCUTANEOUS at 11:16

## 2020-08-13 RX ADMIN — FENTANYL CITRATE 100 MCG: 50 INJECTION INTRAMUSCULAR; INTRAVENOUS at 10:34

## 2020-08-13 ASSESSMENT — PAIN SCALES - GENERAL
PAINLEVEL_OUTOF10: 7
PAINLEVEL_OUTOF10: 0
PAINLEVEL_OUTOF10: 6
PAINLEVEL_OUTOF10: 0
PAINLEVEL_OUTOF10: 8

## 2020-08-13 ASSESSMENT — PAIN DESCRIPTION - ORIENTATION
ORIENTATION: LEFT

## 2020-08-13 ASSESSMENT — PULMONARY FUNCTION TESTS
PIF_VALUE: 28
PIF_VALUE: 1
PIF_VALUE: 25
PIF_VALUE: 25
PIF_VALUE: 4
PIF_VALUE: 24
PIF_VALUE: 3
PIF_VALUE: 24
PIF_VALUE: 24
PIF_VALUE: 25
PIF_VALUE: 24
PIF_VALUE: 23
PIF_VALUE: 16
PIF_VALUE: 24
PIF_VALUE: 19
PIF_VALUE: 25
PIF_VALUE: 24
PIF_VALUE: 23
PIF_VALUE: 4
PIF_VALUE: 25
PIF_VALUE: 1
PIF_VALUE: 24
PIF_VALUE: 24
PIF_VALUE: 0
PIF_VALUE: 24
PIF_VALUE: 24
PIF_VALUE: 0

## 2020-08-13 ASSESSMENT — PAIN DESCRIPTION - PAIN TYPE
TYPE: ACUTE PAIN
TYPE: ACUTE PAIN
TYPE: SURGICAL PAIN

## 2020-08-13 ASSESSMENT — LIFESTYLE VARIABLES: SMOKING_STATUS: 1

## 2020-08-13 ASSESSMENT — ENCOUNTER SYMPTOMS: SHORTNESS OF BREATH: 0

## 2020-08-13 ASSESSMENT — PAIN DESCRIPTION - LOCATION
LOCATION: FLANK

## 2020-08-13 NOTE — PROGRESS NOTES
Bedside report received from [address] and Swati STRONG, pt sleepy easy to arouse vitals WNL to pre-op baseline. [address]

## 2020-08-13 NOTE — FLOWSHEET NOTE
08/13/20 0830   Vital Signs   Temp 97.8 °F (36.6 °C)   Temp Source Oral   Pulse 68   Heart Rate Source Monitor   Resp 14   BP (!) 90/55   BP Location Left upper arm   BP Upper/Lower Upper   Patient Position Lying right side   Level of Consciousness 0   MEWS Score 1   Patient Currently in Pain Denies   Oxygen Therapy   SpO2 97 %   O2 Device None (Room air)     Spoke with dr. Ino Zhou he plans on doing surgery later this morning early/early afternoon. Consent for surgery has been signed and is in patient chart. Patient also needs a pregnancy test. Orders placed for a urine pregnancy test but patient has recently had her blood drawn. Called lab to see if they are able to do a pregnancy test with the blood draws. They are able and will be doing a blood HCG test. Patient remains with fluids running. No pain currently per pt. Denied any other needs. Call light in reach.

## 2020-08-13 NOTE — PROGRESS NOTES
Pt A/O x 4 assessment completed. IVF infusing. Meds given per MAR. Pt reminded to call out when she urinates so we can strain her urine. Pt requesting door closed at this time d/t screaming pt. Pt denies any other needs.  Call light within reach

## 2020-08-13 NOTE — PROGRESS NOTES
Patient is discharging back to the home today. Tolerated her lunch and vitals are stable patient has also been ambulating in room with a steady gait. Patients  is here to get patient. Iv was removed and dressing applied. Paper prescriptions were given to patient. Ensured patient had all of her jewelry, glasses and phone back. Ensured alll of patients other belongings were collected and with patient. Educated patient on her medications. When to take them and the potential side effects of her new medications. Patient denied any pain. Patient was able to urinate already after the surgery. Educated patient and  on need to follow up with dr. Elmer Greenfield in 3-4 weeks for stent removal. Pt acknowledged. Pt denied need for wheelchair. Patient walked down to car with . Care complete.

## 2020-08-13 NOTE — PROGRESS NOTES
Bedside report given to [address] [facility] [address] RN  pt in stable condition no needs at this time.  Call light within reach

## 2020-08-13 NOTE — CONSULTS
Urology Consult Note      Reason for Admission:   Left ureteral stone    History:   Pt is a 42 yo WF with a h/o stones (she has had multiple surgeries, including PCNL and stents) who presented to the ED with left flank pain. CT showed a 5mm proximal left ureteral stone with hydronephrois. WBC, Cr WNL. UA not suggestive of infeciton.  AVSS. Pt continues to have flank discomfort. Meds: see med rec  Family History, Social History, Review of Systems:  Reviewed and agreed to as per chart    Exam:    Vitals:  BP (!) 90/55   Pulse 68   Temp 97.8 °F (36.6 °C) (Oral)   Resp 14   Ht 5' 1\" (1.549 m)   Wt 189 lb 11.2 oz (86 kg)   SpO2 97%   BMI 35.84 kg/m²   Temp  Av.9 °F (36.6 °C)  Min: 97.4 °F (36.3 °C)  Max: 98.2 °F (36.8 °C)    Intake/Output Summary (Last 24 hours) at 2020 0946  Last data filed at 2020 0358  Gross per 24 hour   Intake 747.8 ml   Output 400 ml   Net 347.8 ml        Physical:   Well developed, well nourished in no acute distress   Mood indicates no abnormalities. Pt doesnt appear depressed   Orientated to time and place   Neck is supple, trachea is midline   Respiratory effort is normal   Cardiovascular show no extremity swelling   Abdomen no masses or hernias are palpated, there is no tenderness. Liver and Spleen appear normal.   Skin show no abnormal lesions   Lymph nodes are not palpated in the inguinal, neck, or axillary area.       Labs:  WBC:    Lab Results   Component Value Date    WBC 6.4 2020     Hemoglobin/Hematocrit:    Lab Results   Component Value Date    HGB 13.9 2020    HCT 41.5 2020     BMP:    Lab Results   Component Value Date     2020    K 4.4 2020     2020    CO2 28 2020    BUN 15 2020    LABALBU 4.2 2020    CREATININE 1.0 2020    CALCIUM 9.4 2020    GFRAA >60 2020    GFRAA >60 2013    LABGLOM >60 2020     PT/INR:    Lab Results   Component Value Date PROTIME 10.8 01/20/2018    INR 0.96 01/20/2018     PTT:    Lab Results   Component Value Date    APTT 28.5 01/20/2018   [APTT    Urinalysis:     Imaging:    EXAMINATION:    CT OF THE ABDOMEN AND PELVIS WITHOUT CONTRAST 8/12/2020 8:15 pm         TECHNIQUE:    CT of the abdomen and pelvis was performed without the administration of    intravenous contrast. Multiplanar reformatted images are provided for review. Dose modulation, iterative reconstruction, and/or weight based adjustment of    the mA/kV was utilized to reduce the radiation dose to as low as reasonably    achievable.         COMPARISON:    May 28, 2020         HISTORY:    ORDERING SYSTEM PROVIDED HISTORY: left sided pain with nausea    TECHNOLOGIST PROVIDED HISTORY:    Reason for exam:->left sided pain with nausea    Additional Contrast?->None    Is the patient pregnant?->No    Reason for Exam: left sided flank pain with nausea, pt. states she feels like    she just passed a kidney stone right before we did the CT. Pt. has a hx of    kidney stones    Acuity: Acute    Type of Exam: Initial         FINDINGS:    Lower Chest: No evidence of pneumonia or other acute findings.         Organs: There is a 5 mm proximal left ureteral stone causing mild    hydronephrosis.  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  Several tiny stones elsewhere in the left renal    collecting system measuring 2-3 mm.  Right adrenal adenoma again incidentally    noted measuring 8 mm.         GI/Bowel: No bowel obstruction or other non contrast acute process    demonstrated.  No evidence of colitis, diverticulitis or appendicitis.         Pelvis: Normal appearance of the bladder.         Peritoneum/Retroperitoneum: No free air, free fluid or abscess.  Fat    containing midline ventral hernia above the umbilicus is again noted    measuring approximately 3 cm.      Bones/Soft Tissues: No fracture or other acute osseous process.              Impression    5 mm proximal left ureteral stone causing mild hydronephrosis.               Impression/Plan:   NPO  Cystoscopy, left ureteroscopy, stone manipulation with laser, and stent today with Dr. Eden Mckinney  Pt is aware if unable to reach stone she will be stented and set up for additional procedures    Tung Moe PA-C

## 2020-08-13 NOTE — ANESTHESIA POSTPROCEDURE EVALUATION
Department of Anesthesiology  Postprocedure Note    Patient: Dimple Gabriel  MRN: [id number]  YOB: 1980  Date of evaluation: 8/13/2020  Time:  11:43 AM     Procedure Summary     Date:  08/13/20 Room / Location:  [facility]    Anesthesia Start:  1034 Anesthesia Stop:  1106    Procedure:  CYSTOSCOPY, URETEROSCOPY, HOLMIUM LASER LITHOTRIPSY, STONE EXTRACTION (Left Bladder) Diagnosis:  (LEFT URETERAL STONE)    Surgeon:  Guicho Oscar MD Responsible Provider:  Jayesh Covington MD    Anesthesia Type:  general ASA Status:  3          Anesthesia Type: general    Sai Phase I: Sai Score: 10    Sai Phase II:      Last vitals: Reviewed and per EMR flowsheets.    Vitals:    08/13/20 1110 08/13/20 1115 08/13/20 1120 08/13/20 1130   BP: 115/67 (!) 93/51 122/62 122/62   Pulse: 60 69 55 67   Resp: 8 15 12 14   Temp:    97.8 °F (36.6 °C)   TempSrc:    Temporal   SpO2: 99% 100% 100% 98%   Weight:       Height:           Anesthesia Post Evaluation    Patient location during evaluation: bedside  Patient participation: complete - patient participated  Level of consciousness: awake and alert  Airway patency: patent  Nausea & Vomiting: no nausea  Complications: no  Cardiovascular status: hemodynamically stable  Respiratory status: acceptable  Hydration status: euvolemic

## 2020-08-13 NOTE — PLAN OF CARE
Problem: Falls - Risk of:  Goal: Will remain free from falls  Description: Will remain free from falls  Outcome: Ongoing     Problem: Sensory:  Goal: General experience of comfort will improve  Description: General experience of comfort will improve  Outcome: Ongoing     Problem: Urinary Elimination:  Goal: Signs and symptoms of infection will decrease  Description: Signs and symptoms of infection will decrease  Outcome: Ongoing     Problem: Daily Care:  Goal: Daily care needs are met  Description: Daily care needs are met  Outcome: Ongoing     Problem: Pain:  Goal: Patient's pain/discomfort is manageable  Description: Patient's pain/discomfort is manageable  Outcome: Ongoing     Problem: Discharge Planning:  Goal: Patients continuum of care needs are met  Description: Patients continuum of care needs are met  Outcome: Ongoing

## 2020-08-13 NOTE — ANESTHESIA PRE PROCEDURE
Department of Anesthesiology  Preprocedure Note       Name:  Iris Aguiar   Age:  41 y.o.  :  1980                                          MRN:  [id number]         Date:  2020      Surgeon: Krupa Bhatt):  Muna Easton MD    Procedure: Procedure(s):  CYSTOSCOPY URETEROSCOPY POSSIBLE STONE MANIPULATION    Medications prior to admission:   Prior to Admission medications    Medication Sig Start Date End Date Taking? Authorizing Provider   buprenorphine-naloxone (SUBOXONE) 8-2 MG SUBL SL tablet Place 1 tablet under the tongue daily. Pt takes 12 mg daily   Yes Historical Provider, MD   QUEtiapine (SEROQUEL XR) 150 MG TB24 extended release tablet Take 150 mg by mouth nightly   Yes Historical Provider, MD   albuterol sulfate  (90 Base) MCG/ACT inhaler Inhale 2 puffs into the lungs every 4 hours as needed for Wheezing May Sub Pro-Air or Proventil as needed per insurance.  20  Yes SANTIAGO Sheppard   albuterol sulfate HFA (VENTOLIN HFA) 108 (90 Base) MCG/ACT inhaler Inhale 2 puffs into the lungs 4 times daily as needed for Wheezing 20  Yes SANTIAGO Sheppard   ondansetron (ZOFRAN ODT) 4 MG disintegrating tablet Take 1 tablet by mouth every 8 hours as needed for Nausea  Patient not taking: Reported on 2020   NILES Patel CNP   tamsulosin (FLOMAX) 0.4 MG capsule Take 1 capsule by mouth daily for 5 doses  Patient not taking: Reported on 2020  NILES Patel CNP       Current medications:    Current Facility-Administered Medications   Medication Dose Route Frequency Provider Last Rate Last Dose    HYDROmorphone (DILAUDID) injection 0.5 mg  0.5 mg Intravenous Q4H PRN Dawson Patterson MD   0.5 mg at 20 0512    sodium chloride flush 0.9 % injection 10 mL  10 mL Intravenous 2 times per day Dawson Patterson MD   10 mL at 20 2316    sodium chloride flush 0.9 % injection 10 mL  10 mL Intravenous PRN Dawson Patterson MD  acetaminophen (TYLENOL) tablet 650 mg  650 mg Oral Q6H PRN Ced Buchanan MD        Or    acetaminophen (TYLENOL) suppository 650 mg  650 mg Rectal Q6H PRN Ced Buchanan MD        polyethylene glycol (GLYCOLAX) packet 17 g  17 g Oral Daily PRN Ced Buchanan MD        promethazine (PHENERGAN) tablet 12.5 mg  12.5 mg Oral Q6H PRN Ced Buchanan MD   12.5 mg at 08/12/20 2316    Or    ondansetron (ZOFRAN) injection 4 mg  4 mg Intravenous Q6H PRN Ced Buchanan MD        enoxaparin (LOVENOX) injection 40 mg  40 mg Subcutaneous Daily Ced Buchanan MD        0.9 % sodium chloride infusion   Intravenous Continuous Ced Buchanan  mL/hr at 08/13/20 0836      ketorolac (TORADOL) injection 30 mg  30 mg Intravenous Q6H PRN Ced Buchanan MD   30 mg at 08/13/20 0356    cefdinir (OMNICEF) capsule 300 mg  300 mg Oral 2 times per day Ced Buchanan MD        fluticasone (FLOVENT HFA) 44 MCG/ACT inhaler 2 puff  2 puff Inhalation BID Ced Buchanan MD        tamsulosin (FLOMAX) capsule 0.4 mg  0.4 mg Oral Daily Ced Buchanan MD        QUEtiapine (SEROQUEL) tablet 150 mg  150 mg Oral Nightly Ced Buchanan MD   150 mg at 08/12/20 2322    diphenhydrAMINE (BENADRYL) tablet 25 mg  25 mg Oral Q6H PRN Ced Buchanan MD   25 mg at 08/12/20 2316       Allergies:  No Known Allergies    Problem List:    Patient Active Problem List   Diagnosis Code    [facility] F31.9    Rectal bleeding K62.5    Primary insomnia F51.01    Localized edema R60.0    Bloating R14.0    Ischemic colitis ([facility] K55.9    Abnormal uterine bleeding (AUB) N93.9    Bipolar 2 disorder (HCC) F31.81    Colitis K52.9    Alcohol abuse F10.10    MDD (major depressive disorder), recurrent episode, mild (HCC) F33.0    MDD (major depressive disorder), recurrent episode, moderate (HCC) F33.1    Transaminitis R74.0    Alcohol withdrawal syndrome with complication (HCC) R95.183    Intractable migraine without status migrainosus G43.919    Mild intermittent asthma without complication C44.60    Difficulty sleeping G47.9    Bipolar disorder (HCC) F31.9    Polysubstance dependence in early, early partial, sustained full, or sustained partial remission (HCC) F19.21    Tremors of nervous system R25.1    Nephrolithiasis N20.0       Past Medical History:        Diagnosis Date    Acute ischemic colitis ([facility] 10/20/15    Anesthesia complication     wakes from anesthesia with migraine    Asthma     Had real bad as child and then grew out of it and  then got pregnant it started acting up again.  Bipolar 1 disorder ([facility]     Bipolar affective disorder, current episode depressed ([facility] 5/28/2015    Depression     Kidney stones     has had multiple kidney stones    Migraine     Nodule of left lung     pt has non calcified nodules on both lungs    Nodule of right lung     Opiate addiction ([facility]     Past hx       Past Surgical History:        Procedure Laterality Date    COLONOSCOPY  10/20/15    possible ischemic colitis    DILATION AND CURETTAGE OF UTERUS  10/11/2016    Hysteroscopy, Novasure Ablation    LITHOTRIPSY      x 7    PERCUTANEOUS BALLOON VALVULOPLASTY  2007    TUBAL LIGATION  2001    URETER STENT PLACEMENT      X 2 then removed       Social History:    Social History     Tobacco Use    Smoking status: Current Every Day Smoker     Packs/day: 1.00     Years: 21.00     Pack years: 21.00     Types: Cigarettes     Start date: 4/15/1995    Smokeless tobacco: Never Used   Substance Use Topics    Alcohol use: Not Currently     Comment: quit 2 months ago.                                  Ready to quit: Not Answered  Counseling given: Not Answered      Vital Signs (Current):   Vitals:    08/12/20 2225 08/12/20 2235 08/13/20 0345 08/13/20 0830   BP: 110/80 114/75 105/72 (!) 90/55   Pulse: 77 77 74 68   Resp: 14 16 16 14   Temp:  98.1 °F (36.7 °C) 98.2 °F (36.8 °C) 97.8 °F (36.6 °C)   TempSrc:  Oral Oral Oral   SpO2: 100% 96% 98% 97%   Weight:  189 lb 11.2 oz (86 kg)     Height:  5' 1\" (1.549 m)                                                BP Readings from Last 3 Encounters:   08/13/20 (!) 90/55   05/28/20 113/69   05/18/20 123/86       NPO Status:  mn+, see mar                                                                               BMI:   Wt Readings from Last 3 Encounters:   08/12/20 189 lb 11.2 oz (86 kg)   05/28/20 175 lb (79.4 kg)   05/18/20 175 lb (79.4 kg)     Body mass index is 35.84 kg/m². CBC:   Lab Results   Component Value Date    WBC 6.4 08/13/2020    RBC 4.14 08/13/2020    HGB 13.9 08/13/2020    HCT 41.5 08/13/2020    .3 08/13/2020    RDW 13.1 08/13/2020     08/13/2020       CMP:   Lab Results   Component Value Date     08/13/2020    K 4.4 08/13/2020     08/13/2020    CO2 28 08/13/2020    BUN 15 08/13/2020    CREATININE 1.0 08/13/2020    GFRAA >60 08/13/2020    GFRAA >60 04/14/2013    AGRATIO 1.6 08/12/2020    LABGLOM >60 08/13/2020    GLUCOSE 89 08/13/2020    PROT 6.8 08/12/2020    PROT 6.8 04/28/2010    CALCIUM 9.4 08/13/2020    BILITOT <0.2 08/12/2020    ALKPHOS 92 08/12/2020    AST 15 08/12/2020    ALT 9 08/12/2020       POC Tests: No results for input(s): POCGLU, POCNA, POCK, POCCL, POCBUN, POCHEMO, POCHCT in the last 72 hours.     Coags:   Lab Results   Component Value Date    PROTIME 10.8 01/20/2018    INR 0.96 01/20/2018    APTT 28.5 01/20/2018       HCG (If Applicable):   Lab Results   Component Value Date    PREGTESTUR Negative 05/28/2020        ABGs: No results found for: PHART, PO2ART, FYS7EKK, NRC9EUA, BEART, L7GCDHFC     Type & Screen (If Applicable):  No results found for: LABABO, LABRH    Drug/Infectious Status (If Applicable):  No results found for: HIV, HEPCAB    COVID-19 Screening (If Applicable):   Lab Results   Component Value Date    COVID19 Not Detected 05/28/2020         Anesthesia Evaluation  Patient summary reviewed and Nursing notes reviewed no history of anesthetic complications:   Airway: Mallampati: II  TM distance: <3 FB   Neck ROM: full  Mouth opening: > = 3 FB Dental:          Pulmonary: breath sounds clear to auscultation  (+) asthma (prn inhaler , no o2 req. , stsable): current smoker (1+ ppd)    (-) COPD, shortness of breath and sleep apnea          Patient did not smoke on day of surgery. Cardiovascular:        (-) pacemaker, hypertension, past MI, CAD, CABG/stent, dysrhythmias,  angina and  CHF    ECG reviewed  Rhythm: regular  Rate: normal           Beta Blocker:  Not on Beta Blocker         Neuro/Psych:   (+) headaches: migraine headaches, psychiatric history (bipolar , hx etoh):   (-) seizures, TIA and CVA           GI/Hepatic/Renal:   (+) renal disease: kidney stones,      (-) GERD, liver disease, bowel prep and no morbid obesity       Endo/Other:    (+) no malignancy/cancer. (-) diabetes mellitus, hypothyroidism, hyperthyroidism, blood dyscrasia, arthritis, no malignancy/cancer               Abdominal:       Abdomen: soft and tender. Vascular: negative vascular ROS. Anesthesia Plan      general     ASA 3       Induction: intravenous. MIPS: Postoperative opioids intended. Anesthetic plan and risks discussed with patient. Plan discussed with CRNA. This pre-anesthesia assessment may be used as a history and physical.    DOS STAFF ADDENDUM:    Pt seen and examined, chart reviewed (including anesthesia, drug and allergy history). No interval changes to history and physical examination. Anesthetic plan, risks, benefits, alternatives, and personnel involved discussed with patient. Patient verbalized an understanding and agrees to proceed.       Jerry Verdin MD  August 13, 2020  10:19 AM          Jerry Verdin MD   8/13/2020

## 2020-08-13 NOTE — BRIEF OP NOTE
Brief Postoperative Note      Patient: Lorna Martinez  YOB: 1980  MRN: [id number]    Date of Procedure: 8/13/2020    Pre-Op Diagnosis: LEFT URETERAL STONE    Post-Op Diagnosis: Same       Procedure(s):  CYSTOSCOPY, URETEROSCOPY, HOLMIUM LASER LITHOTRIPSY, STONE EXTRACTION    Surgeon(s):  Julianna Hamilton MD    Assistant:  * No surgical staff found *    Anesthesia: General    Estimated Blood Loss (mL): Minimal    Complications: None    Specimens:   * No specimens in log *    Implants:  * No implants in log *      Drains: * No LDAs found *    Findings: Upper ureteral stone. PLAN:  Patient can be discharged when stable. F/U in office in 3-4 weeks.     Electronically signed by Adelita Bella MD on 8/13/2020 at 11:46 AM

## 2020-08-13 NOTE — DISCHARGE SUMMARY
Name:  Francisco Bhagat  Room:  0216/0216-01  MRN:    [id number]    Discharge Summary      This discharge summary is in conjunction with a complete physical exam done on the day of discharge. Discharging Physician: Dr. Phillips Bolds: 8/12/2020  Discharge: 8/13/2020     HPI taken from admission H&P:    41 y.o. female who presented to the hospital with a chief complaint of left flank pain, nausea. The patient has a history of kidney stones and actually follows outpatient with urology. She actually has a procedure scheduled on August 27 for percutaneous lithotripsy. Today she came in with intractable left flank pain. She was found to have a 5 mm stone but due to intractable pain she will be admitted for urology evaluation. 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 Diagnoses this Admission and Hospital Course   Obstructive Uropathy   - 5 mm proximal L ureteral stone with mild hydro noted on CT A/P   - Diet; NPO   - IVF, Flomax   - PRN symptom management   - Urology consult: s/p cystoscopy/ureteroscopy, laser lithotripsy, stone extraction 8/13  - No associated infection or DONATO   - D/c home on Cipro and pyridium   - OP follow up with urology     Asthma   - No AE   - Continue home medications      Bipolar DO   Depression   - Continue home medications      Opiate addiction   - Continue home Suboxone     Procedures (Please Review Full Report for Details)  S/p CYSTOSCOPY, URETEROSCOPY, HOLMIUM LASER LITHOTRIPSY, STONE EXTRACTION 8/13    Consults    Urology    Physical Exam at Discharge:    BP (!) 108/52   Pulse 66   Temp 97 °F (36.1 °C) (Oral)   Resp 16   Ht 5' 1\" (1.549 m)   Wt 189 lb 11.2 oz (86 kg)   SpO2 95%   BMI 35.84 kg/m²     Gen: No distress. Alert. Eyes: No conjunctival injection. ENT: No discharge. Pharynx clear. Dry mucous membranes   Neck: Trachea midline.   Resp: No accessory muscle use. No crackles. No wheezes. No rhonchi. CV: Regular rate. Regular rhythm. No murmur. No rub. No edema. Capillary Refill: Brisk,< 3 seconds   Peripheral Pulses: +2 palpable, equal bilaterally   GI: Non-tender. Non-distended. Normal bowel sounds. Skin: Warm and dry. M/S: No cyanosis. No joint deformity. No clubbing. Neuro: Awake. Grossly nonfocal    Psych: Oriented x 3. No anxiety or agitation. CBC:   Recent Labs     08/12/20 2129 08/13/20 0523   WBC 6.9 6.4   HGB 14.8 13.9   HCT 43.7 41.5   MCV 99.8 100.3*    170     BMP:   Recent Labs     08/12/20 2129 08/13/20 0523    141   K 3.9 4.4    105   CO2 28 28   BUN 13 15   CREATININE 0.9 1.0     LIVER PROFILE:   Recent Labs     08/12/20 2129   AST 15   ALT 9*   LIPASE 42.0   BILITOT <0.2   ALKPHOS 92     UA:  Recent Labs     08/12/20 1907   COLORU Yellow   PHUR 7.0   WBCUA 6-9*   RBCUA *   MUCUS 1+*   BACTERIA 1+*   CLARITYU SL CLOUDY*   SPECGRAV 1.010   LEUKOCYTESUR SMALL*   UROBILINOGEN 0.2   BILIRUBINUR Negative   BLOODU LARGE*   GLUCOSEU Negative   AMORPHOUS Rare     CULTURES  Urine Cx: pending     RADIOLOGY  FL LESS THAN 1 HOUR   Final Result   Intraprocedural fluoroscopic spot images as above. See separate procedure   report for more information. CT ABDOMEN PELVIS WO CONTRAST Additional Contrast? None   Final Result   5 mm proximal left ureteral stone causing mild hydronephrosis.              Discharge Medications     Medication List      START taking these medications    ciprofloxacin 250 MG tablet  Commonly known as:  CIPRO  Take 1 tablet by mouth 2 times daily for 5 days     phenazopyridine 200 MG tablet  Commonly known as:  Pyridium  Take 1 tablet by mouth 3 times daily as needed for Pain        CONTINUE taking these medications    * albuterol sulfate  (90 Base) MCG/ACT inhaler  Commonly known as:  Ventolin HFA  Inhale 2 puffs into the lungs 4 times daily as needed for Wheezing     * albuterol sulfate  (90 Base) MCG/ACT inhaler  Inhale 2 puffs into the lungs every 4 hours as needed for Wheezing May Sub Pro-Air or Proventil as needed per insurance. buprenorphine-naloxone 8-2 MG Subl SL tablet  Commonly known as:  SUBOXONE     ondansetron 4 MG disintegrating tablet  Commonly known as:  Zofran ODT  Take 1 tablet by mouth every 8 hours as needed for Nausea     QUEtiapine 150 MG Tb24 extended release tablet  Commonly known as:  SEROQUEL XR     tamsulosin 0.4 MG capsule  Commonly known as:  Flomax  Take 1 capsule by mouth daily for 5 doses         * This list has 2 medication(s) that are the same as other medications prescribed for you. Read the directions carefully, and ask your doctor or other care provider to review them with you. Where to Get Your Medications      You can get these medications from any pharmacy    Bring a paper prescription for each of these medications  · ciprofloxacin 250 MG tablet  · phenazopyridine 200 MG tablet       Discharged in stable condition to home    Follow Up:   Follow up with PCP, urology OP      Stephanie Newman 8/13/2020 1:06 PM

## 2020-08-13 NOTE — PROGRESS NOTES
4 Eyes Skin Assessment     The patient is being assess for   Admission    I agree that 2 RN's have performed a thorough Head to Toe Skin Assessment on the patient. ALL assessment sites listed below have been assessed. Areas assessed by both nurses:   [x]   Head, Face, and Ears   [x]   Shoulders, Back, and Chest, Abdomen  [x]   Arms, Elbows, and Hands   [x]   Coccyx, Sacrum, and Ischium  [x]   Legs, Feet, and Heels        No skin issues noted     **SHARE this note so that the co-signing nurse is able to place an eSignature**    Co-signer eSignature: Electronically signed by Ernestine Astorga RN on 8/12/20 at 11:52 PM EDT    Does the Patient have Skin Breakdown?   No          Kennedy Prevention initiated:  No   Wound Care Orders initiated:  No      [facility] nurse consulted for Pressure Injury (Stage 3,4, Unstageable, DTI, NWPT, Complex wounds)and New or Established Ostomies:  No      Primary Nurse eSignature: Electronically signed by Luis Myers RN on 8/12/20 at 11:43 PM EDT

## 2020-08-13 NOTE — PROGRESS NOTES
Spoke to pt regarding her dose of Suboxone  12 mg daily, if pt stays past tomorrow pts  will bring in her supply.

## 2020-08-13 NOTE — PROGRESS NOTES
Bedside report given to [facility], pt resting in bed no distress noted. Martita Dooley

## 2020-08-13 NOTE — ED NOTES
Report given to Shashank Yin RN. Patient transported to floor via wheelchair, with nurse, belongings with patient.      Vanessa Chandler RN  08/12/20 7801

## 2020-08-13 NOTE — H&P
Hospital Medicine History & Physical      PCP: SANTIAGO Martines    Date of Admission: 8/12/2020    Date of Service: Pt seen/examined on 8/12/2020    Chief Complaint: Left flank pain    History Of Present Illness:    41 y.o. female who presented to the hospital with a chief complaint of left flank pain, nausea. The patient has a history of kidney stones and actually follows outpatient with urology. She actually has a procedure scheduled on August 27 for percutaneous lithotripsy. Today she came in with intractable left flank pain. She was found to have a 5 mm stone but due to intractable pain she will be admitted for urology evaluation. 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 Past Medical History:        Diagnosis Date    Acute ischemic colitis ([address] 10/20/15    Anesthesia complication     wakes from anesthesia with migraine    Asthma     Had real bad as child and then grew out of it and  then got pregnant it started acting up again.  Bipolar 1 disorder ([address]     Bipolar affective disorder, current episode depressed ([address] 5/28/2015    Depression     Kidney stones     has had multiple kidney stones    Migraine     Nodule of left lung     pt has non calcified nodules on both lungs    Nodule of right lung     Opiate addiction ([address]     Past hx       Past Surgical History:          Procedure Laterality Date    COLONOSCOPY  10/20/15    possible ischemic colitis    DILATION AND CURETTAGE OF UTERUS  10/11/2016    Hysteroscopy, Novasure Ablation    LITHOTRIPSY      x 7    PERCUTANEOUS BALLOON VALVULOPLASTY  2007    TUBAL LIGATION  2001    URETER STENT PLACEMENT      X 2 then removed       Medications Prior to Admission:      Prior to Admission medications    Medication Sig Start Date End Date Taking?  Authorizing Provider   buprenorphine-naloxone (SUBOXONE) 8-2 MG SUBL SL tablet Place 1 tablet under the tongue daily. Pt takes 12 mg daily   Yes Historical Provider, MD   QUEtiapine (SEROQUEL XR) 150 MG TB24 extended release tablet Take 150 mg by mouth nightly   Yes Historical Provider, MD   albuterol sulfate  (90 Base) MCG/ACT inhaler Inhale 2 puffs into the lungs every 4 hours as needed for Wheezing May Sub Pro-Air or Proventil as needed per insurance. 6/1/20  Yes SANTIAGO Arango   albuterol sulfate HFA (VENTOLIN HFA) 108 (90 Base) MCG/ACT inhaler Inhale 2 puffs into the lungs 4 times daily as needed for Wheezing 4/24/20  Yes SANTIAGO Arango   ondansetron (ZOFRAN ODT) 4 MG disintegrating tablet Take 1 tablet by mouth every 8 hours as needed for Nausea  Patient not taking: Reported on 6/1/2020 5/18/20   NILES Patel CNP   tamsulosin (FLOMAX) 0.4 MG capsule Take 1 capsule by mouth daily for 5 doses  Patient not taking: Reported on 6/1/2020 5/18/20 5/23/20  NILES Patel CNP       Allergies:  Patient has no known allergies. Social History:      TOBACCO:   reports that she has been smoking cigarettes. She started smoking about 25 years ago. She has a 21.00 pack-year smoking history. She has never used smokeless tobacco.  ETOH:   reports previous alcohol use. Family History:          Problem Relation Age of Onset    High Blood Pressure Father     Kidney Disease Father     Cancer Maternal Grandmother     Cancer Maternal Grandfather     Kidney Disease Maternal Grandfather     Cancer Paternal Grandmother     Cancer Paternal Grandfather     Cancer Other 3        ALL    Diabetes Maternal Uncle     COPD Mother     Depression Sister     Ovarian Cancer Sister        REVIEW OF SYSTEMS:   Constitutional:  Negative for fever,chills or night sweats  ENT:  Negative for rhinorrhea, epistaxis, hoarseness, sore throat.   Respiratory: Negative for SOB or wheezing   Cardiovascular:   Negative for  chest pain, palpitations   Gastrointestinal:   Left flank pain, positive for nausea  Genitourinary:  Negative for polyuria, dysuria   Hematologic/Lymphatic:  Negative for  bleeding tendency, easy bruising  Musculoskeletal:  Negative for myalgias and arthralgias  Neurologic:  Negative for  confusion,dysarthria. Skin:  Negative for itching,rash  Psychiatric:  Negative for depression,anxiety, agitation. Endocrine:  Negative for polydipsia,polyuria,heat /cold intolerance. PHYSICAL EXAM:    /75   Pulse 77   Temp 98.1 °F (36.7 °C) (Oral)   Resp 16   Ht 5' 1\" (1.549 m)   Wt 189 lb 11.2 oz (86 kg)   SpO2 96%   BMI 35.84 kg/m²     General appearance:  No apparent distress, appears stated age and cooperative. HEENT:  Normal cephalic, atraumatic without obvious deformity. Pupils equal, round, and reactive to light. Extra ocular muscles intact. Conjunctivae/corneas clear. Neck: Supple, with full range of motion. No jugular venous distention. Trachea midline. Respiratory:  Normal respiratory effort. Clear to auscultation, bilaterally without Rales/Wheezes/Rhonchi. Cardiovascular:  Regular rate and rhythm with normal S1/S2 without murmurs, rubs or gallops. Abdomen: Soft, non-tender, non-distended with normal bowel sounds. Musculoskeletal:  No clubbing, cyanosis or edema bilaterally. Full range of motion without deformity. Skin: Skin color, texture, turgor normal.  No rashes or lesions. Neurologic:  Neurovascularly intact without any focal sensory/motor deficits.  Cranial nerves: II-XII intact, grossly non-focal.  Psychiatric:  Alert and oriented, thought content appropriate, normal insight  Capillary Refill: Brisk,< 3 seconds   Peripheral Pulses: +2 palpable, equal bilaterally       Labs:   Recent Labs     08/12/20 2129   WBC 6.9   HGB 14.8   HCT 43.7        Recent Labs     08/12/20 2129      K 3.9      CO2 28   BUN 13   CREATININE 0.9   CALCIUM 9.4     Recent Labs     08/12/20 2129   AST 15   ALT 9*   BILITOT <0.2   ALKPHOS 92     No results for input(s): INR in the last 72 hours. No results for input(s): Zachariah Dooley in the last 72 hours. Urinalysis:      Lab Results   Component Value Date    NITRU Negative 08/12/2020    WBCUA 6-9 08/12/2020    BACTERIA 1+ 08/12/2020    RBCUA  08/12/2020    BLOODU LARGE 08/12/2020    SPECGRAV 1.010 08/12/2020    GLUCOSEU Negative 08/12/2020    GLUCOSEU Negative 07/23/2010       Radiology:   CT ABDOMEN PELVIS WO CONTRAST Additional Contrast? None   Final Result   5 mm proximal left ureteral stone causing mild hydronephrosis. ASSESSMENT:  Ureterolithiasis  Hydronephrosis  Asthma  Bipolar disorder  Depression  Opiate addiction    PLAN:  Keep n.p.o., IV fluids, Flomax  Urology consulted by emergency department  Pain control, resume home medications including Suboxone  Reevaluate in a.m. DVT Prophylaxis: Lovenox  Diet: Diet NPO, After Midnight  Diet NPO, After Midnight  DIET GENERAL;  Code Status: Full Code    Dispo -[facility]      Thank you for the opportunity to be involved in this patient's care.       (Please note that portions of this note were completed with a voice recognition program. Efforts were made to edit the dictations but occasionally words are mis-transcribed.)

## 2020-08-13 NOTE — CARE COORDINATION
INTERDISCIPLINARY PLAN OF CARE CONFERENCE    Date/Time: 8/13/2020 10:11 AM  Completed by: Shun Ohara Case Management      Patient Name:  Edwardo Britton  YOB: 1980  Admitting Diagnosis: Nephrolithiasis [N20.0]     Admit Date/Time:  8/12/2020  7:03 PM    Chart reviewed. Interdisciplinary team met to discuss patient progress and discharge plans. Disciplines included Case Management, Nursing, and Dietitian. Current Status: OBS    PT/OT recommendation:n/a at this time    Anticipated Discharge Date: TBD  Expected D/C Disposition:  Home  Confirmed plan with patient and/or family No  Discharge Plan Comments: likely home with no needs      The Plan for Transition of Care is related to the following treatment goals: home, anticipate no needs. Pt is 41years old, with primary and secondary insurance, a PCP and works.  NNNF    Home O2 in place on admit: No  Pt informed of need to bring portable home O2 tank on day of discharge for nursing to connect prior to leaving:  Not Indicated  Verbalized agreement/Understanding:  Not Indicated

## 2020-08-13 NOTE — FLOWSHEET NOTE
08/12/20 2235   Vital Signs   Temp 98.1 °F (36.7 °C)   Temp Source Oral   Pulse 77   Heart Rate Source Monitor   Resp 16   /75   BP Location Left upper arm   BP Upper/Lower Upper   Patient Position Sitting   Level of Consciousness 0   MEWS Score 1   Height and Weight   Height 5' 1\" (1.549 m)   Oxygen Therapy   SpO2 96 %   O2 Device None (Room air)   Pt A/O x 4 admission questions completed. Pts med list not updated in ER meds not ordered correctly. Perfect serve sent to  Pt educated on the need to strain the urine and also NPO at midnight.

## 2020-08-14 ENCOUNTER — TELEPHONE (OUTPATIENT)
Dept: FAMILY MEDICINE CLINIC | Age: 40
End: 2020-08-14

## 2020-08-14 NOTE — OP NOTE
[address]                 [address] [facility] [address]                                OPERATIVE REPORT    PATIENT NAME: Chacha Macias                  :        1980  MED REC NO:   [id number]                          ROOM:       0216  ACCOUNT NO:   [de-identified]                           ADMIT DATE: 2020  PROVIDER:     Param Conde MD    DATE OF PROCEDURE:  2020    PREOPERATIVE DIAGNOSIS:  Left ureteral calculus. POSTOPERATIVE DIAGNOSIS:  Left ureteral calculus. OPERATION PERFORMED:  Cystoscopy with left ureteroscopy, holmium laser  lithotripsy, and stone extraction. PRIMARY SURGEON:  Param Conde MD    ANESTHESIA:  General.    ESTIMATED BLOOD LOSS:  5 mL. OPERATIVE FINDINGS:  Mid upper ureteral stone with hydronephrosis. HISTORY AND INDICATIONS:  This is a 43-year-old white female, who had  presented to the hospital with acute onset of left renal colic. She had  been admitted the day before for pain control and IV fluids. Urologic  consultation was obtained. The patient was found to have a large stone  in the upper ureter with obstruction. Options were discussed, and she  elected to proceed forth with a left ureteroscopy. Risks, benefits, and  expected outcomes of the procedure have been discussed. PROCEDURE IN DETAIL:  After obtaining informed consent, the patient was  taken to the operative suite. She was given a general anesthetic as  well as intravenous antibiotics. Once adequately anesthetized, she was  placed on the operative table in a modified dorsal lithotomy position. All pressure points were well padded. Prepping and draping were done in  sterile fashion. Cystourethroscopy was then performed both 30-and 70-degree lenses. Mild  prolapse of the bladder was noted. Both ureteral orifices were  orthotopic with efflux on both sides, although somewhat less on the  left.   Under fluoroscopy, which is used throughout this patient's  procedure, a Glidewire was then placed into the left ureteral orifice  and beyond the level of her stone, which was found in the midportion of  the ureter. This was followed by passage of ureteroscope up to this  level. The stone was able to be identified and was fractured into  multiple pieces using a holmium laser. These were subsequently  extracted with a Giron basket and extracted from the patient's bladder. The stone fragments were sent for pathologic analysis. Repeat  evaluation of the area, where the stone was, showed that there was not  any significant edema or bleeding. The patient has had stents in the  past and has not tolerated them very well. At this point, I decided not  to place a double-J stent, as there appeared to be no postoperative  obstruction. The patient's bladder was then drained and lidocaine jelly  was applied. She was taken back to the postop recovery room in stable  condition.         John Garrido MD    D: 08/14/2020 7:28:12       T: 08/14/2020 7:37:42     [email or web address]  Job#: [id number]     Doc#: [id number]    CC:

## 2020-08-16 LAB
CALCULI COMPOSITION: NORMAL
MASS: 15 MG
STONE DESCRIPTION: NORMAL
STONE NUMBER: 1
STONE SIZE: NORMAL MM

## 2020-08-18 ENCOUNTER — HOSPITAL ENCOUNTER (OUTPATIENT)
Dept: GENERAL RADIOLOGY | Age: 40
Discharge: HOME | End: 2020-08-18
Payer: COMMERCIAL

## 2020-08-18 ENCOUNTER — HOSPITAL ENCOUNTER (OUTPATIENT)
Age: 40
Discharge: HOME | End: 2020-08-18
Payer: COMMERCIAL

## 2020-08-18 ENCOUNTER — HOSPITAL ENCOUNTER (OUTPATIENT)
Dept: ULTRASOUND IMAGING | Age: 40
Discharge: HOME | End: 2020-08-18
Payer: COMMERCIAL

## 2020-08-18 PROCEDURE — 74018 RADEX ABDOMEN 1 VIEW: CPT

## 2020-08-18 PROCEDURE — 76770 US EXAM ABDO BACK WALL COMP: CPT

## 2020-08-21 ENCOUNTER — OFFICE VISIT (OUTPATIENT)
Dept: PRIMARY CARE CLINIC | Age: 40
End: 2020-08-21

## 2020-08-21 ENCOUNTER — HOSPITAL ENCOUNTER (OUTPATIENT)
Dept: PREADMISSION TESTING | Age: 40
Discharge: HOME | End: 2020-08-25
Payer: COMMERCIAL

## 2020-08-21 LAB
ANION GAP SERPL CALCULATED.3IONS-SCNC: 10 MMOL/L (ref 3–16)
BASOPHILS ABSOLUTE: 0.1 K/UL (ref 0–0.2)
BASOPHILS RELATIVE PERCENT: 0.8 %
BILIRUBIN URINE: NEGATIVE
BLOOD, URINE: NEGATIVE
BUN BLDV-MCNC: 25 MG/DL (ref 7–20)
CALCIUM SERPL-MCNC: 9.8 MG/DL (ref 8.3–10.6)
CHLORIDE BLD-SCNC: 103 MMOL/L (ref 99–110)
CLARITY: CLEAR
CO2: 28 MMOL/L (ref 21–32)
COLOR: YELLOW
CREAT SERPL-MCNC: 1 MG/DL (ref 0.6–1.1)
CRYSTALS, UA: ABNORMAL /HPF
EOSINOPHILS ABSOLUTE: 0.5 K/UL (ref 0–0.6)
EOSINOPHILS RELATIVE PERCENT: 7.5 %
EPITHELIAL CELLS, UA: ABNORMAL /HPF (ref 0–5)
GFR AFRICAN AMERICAN: >60
GFR NON-AFRICAN AMERICAN: >60
GLUCOSE BLD-MCNC: 77 MG/DL (ref 70–99)
GLUCOSE URINE: NEGATIVE MG/DL
HCT VFR BLD CALC: 46.9 % (ref 36–48)
HEMOGLOBIN: 15.5 G/DL (ref 12–16)
HYALINE CASTS: ABNORMAL /LPF (ref 0–2)
KETONES, URINE: NEGATIVE MG/DL
LEUKOCYTE ESTERASE, URINE: ABNORMAL
LYMPHOCYTES ABSOLUTE: 2.4 K/UL (ref 1–5.1)
LYMPHOCYTES RELATIVE PERCENT: 38.9 %
MCH RBC QN AUTO: 33 PG (ref 26–34)
MCHC RBC AUTO-ENTMCNC: 33 G/DL (ref 31–36)
MCV RBC AUTO: 100.1 FL (ref 80–100)
MICROSCOPIC EXAMINATION: YES
MONOCYTES ABSOLUTE: 0.5 K/UL (ref 0–1.3)
MONOCYTES RELATIVE PERCENT: 7.6 %
MUCUS: ABNORMAL /LPF
NEUTROPHILS ABSOLUTE: 2.7 K/UL (ref 1.7–7.7)
NEUTROPHILS RELATIVE PERCENT: 45.2 %
NITRITE, URINE: NEGATIVE
PDW BLD-RTO: 13.3 % (ref 12.4–15.4)
PH UA: 5.5 (ref 5–8)
PLATELET # BLD: 201 K/UL (ref 135–450)
PMV BLD AUTO: 9.9 FL (ref 5–10.5)
POTASSIUM SERPL-SCNC: 4.2 MMOL/L (ref 3.5–5.1)
PROTEIN UA: NEGATIVE MG/DL
RBC # BLD: 4.69 M/UL (ref 4–5.2)
RBC UA: ABNORMAL /HPF (ref 0–4)
SODIUM BLD-SCNC: 141 MMOL/L (ref 136–145)
SPECIFIC GRAVITY UA: >=1.03 (ref 1–1.03)
URINE TYPE: ABNORMAL
UROBILINOGEN, URINE: 0.2 E.U./DL
WBC # BLD: 6.1 K/UL (ref 4–11)
WBC UA: ABNORMAL /HPF (ref 0–5)

## 2020-08-21 PROCEDURE — 80048 BASIC METABOLIC PNL TOTAL CA: CPT

## 2020-08-21 PROCEDURE — 36415 COLL VENOUS BLD VENIPUNCTURE: CPT

## 2020-08-21 PROCEDURE — 85025 COMPLETE CBC W/AUTO DIFF WBC: CPT

## 2020-08-21 PROCEDURE — 87086 URINE CULTURE/COLONY COUNT: CPT

## 2020-08-21 PROCEDURE — 81001 URINALYSIS AUTO W/SCOPE: CPT

## 2020-08-21 NOTE — PATIENT INSTRUCTIONS

## 2020-08-22 LAB — URINE CULTURE, ROUTINE: NORMAL

## 2020-08-24 ENCOUNTER — HOSPITAL ENCOUNTER (OUTPATIENT)
Age: 40
Discharge: HOME | End: 2020-08-24
Payer: COMMERCIAL

## 2020-08-24 ENCOUNTER — HOSPITAL ENCOUNTER (OUTPATIENT)
Dept: INTERVENTIONAL RADIOLOGY/VASCULAR | Age: 40
Discharge: HOME | End: 2020-08-24
Payer: COMMERCIAL

## 2020-08-24 VITALS
OXYGEN SATURATION: 97 % | DIASTOLIC BLOOD PRESSURE: 77 MMHG | HEART RATE: 77 BPM | RESPIRATION RATE: 22 BRPM | SYSTOLIC BLOOD PRESSURE: 105 MMHG

## 2020-08-24 LAB
INR BLD: 0.95 (ref 0.86–1.14)
PROTHROMBIN TIME: 11 SEC (ref 10–13.2)
SARS-COV-2, NAA: NOT DETECTED

## 2020-08-24 PROCEDURE — 36415 COLL VENOUS BLD VENIPUNCTURE: CPT

## 2020-08-24 PROCEDURE — 85610 PROTHROMBIN TIME: CPT

## 2020-08-24 NOTE — PROGRESS NOTES
IR Attending Note    Tima seen and imaging reviewed. Most recent imaging showed no hydronephrosis and therefore I performed a bedside limited US of the left kidney to evaluate for any hydronephrosis. No hydrtonephrosis was visualized. Therefore, at this time a nephrostomy tube would not relieve any of the patients pain and it is higher risk to place due to the lack of hydronephrosis. The patient is scheduled for a nephrolithotomy at [facility] on 8/27 and should get the left percutaneous nephrsotomy access at that time in conjunction with Urology who can place a balloon occlusion catheter. This was discussed and explained to the patient and she agreed.     Abimael Moore MD

## 2020-08-27 ENCOUNTER — HOSPITAL ENCOUNTER (OUTPATIENT)
Dept: GENERAL RADIOLOGY | Age: 40
Setting detail: OUTPATIENT SURGERY
Discharge: HOME | End: 2020-08-27
Attending: UROLOGY
Payer: COMMERCIAL

## 2020-09-02 RX ORDER — ALBUTEROL SULFATE 90 UG/1
2 AEROSOL, METERED RESPIRATORY (INHALATION) EVERY 4 HOURS PRN
Qty: 1 INHALER | Refills: 5 | Status: SHIPPED | OUTPATIENT
Start: 2020-09-02 | End: 2022-07-19

## 2020-09-03 ENCOUNTER — HOSPITAL ENCOUNTER (OUTPATIENT)
Dept: GENERAL RADIOLOGY | Age: 40
Discharge: HOME | End: 2020-09-03
Payer: COMMERCIAL

## 2020-09-16 ENCOUNTER — ANESTHESIA EVENT (OUTPATIENT)
Dept: OPERATING ROOM | Age: 40
End: 2020-09-16
Payer: COMMERCIAL

## 2020-09-17 ENCOUNTER — HOSPITAL ENCOUNTER (OUTPATIENT)
Dept: INTERVENTIONAL RADIOLOGY/VASCULAR | Age: 40
Discharge: HOME | End: 2020-09-17
Payer: COMMERCIAL

## 2020-09-17 ENCOUNTER — HOSPITAL ENCOUNTER (OUTPATIENT)
Dept: GENERAL RADIOLOGY | Age: 40
Discharge: HOME | End: 2020-09-17
Payer: COMMERCIAL

## 2020-09-17 ENCOUNTER — HOSPITAL ENCOUNTER (OUTPATIENT)
Age: 40
Setting detail: OBSERVATION
Discharge: HOME | End: 2020-09-18
Attending: UROLOGY | Admitting: UROLOGY
Payer: COMMERCIAL

## 2020-09-17 ENCOUNTER — APPOINTMENT (OUTPATIENT)
Dept: ULTRASOUND IMAGING | Age: 40
End: 2020-09-17
Attending: UROLOGY
Payer: COMMERCIAL

## 2020-09-17 ENCOUNTER — ANESTHESIA (OUTPATIENT)
Dept: OPERATING ROOM | Age: 40
End: 2020-09-17
Payer: COMMERCIAL

## 2020-09-17 VITALS — OXYGEN SATURATION: 100 % | DIASTOLIC BLOOD PRESSURE: 67 MMHG | SYSTOLIC BLOOD PRESSURE: 103 MMHG

## 2020-09-17 PROBLEM — N20.0 RENAL CALCULI: Status: ACTIVE | Noted: 2020-09-17

## 2020-09-17 LAB — PREGNANCY, URINE: NEGATIVE

## 2020-09-17 PROCEDURE — C1894 INTRO/SHEATH, NON-LASER: HCPCS

## 2020-09-17 PROCEDURE — 3700000001 HC ADD 15 MINUTES (ANESTHESIA): Performed by: UROLOGY

## 2020-09-17 PROCEDURE — 2500000003 HC RX 250 WO HCPCS: Performed by: UROLOGY

## 2020-09-17 PROCEDURE — 7100000000 HC PACU RECOVERY - FIRST 15 MIN: Performed by: UROLOGY

## 2020-09-17 PROCEDURE — 2700000000 HC OXYGEN THERAPY PER DAY

## 2020-09-17 PROCEDURE — 94761 N-INVAS EAR/PLS OXIMETRY MLT: CPT

## 2020-09-17 PROCEDURE — 3600000015 HC SURGERY LEVEL 5 ADDTL 15MIN: Performed by: UROLOGY

## 2020-09-17 PROCEDURE — G0378 HOSPITAL OBSERVATION PER HR: HCPCS

## 2020-09-17 PROCEDURE — 6360000002 HC RX W HCPCS: Performed by: NURSE ANESTHETIST, CERTIFIED REGISTERED

## 2020-09-17 PROCEDURE — 50432 PLMT NEPHROSTOMY CATHETER: CPT

## 2020-09-17 PROCEDURE — 3600000005 HC SURGERY LEVEL 5 BASE: Performed by: UROLOGY

## 2020-09-17 PROCEDURE — 2709999900 HC NON-CHARGEABLE SUPPLY: Performed by: UROLOGY

## 2020-09-17 PROCEDURE — 76775 US EXAM ABDO BACK WALL LIM: CPT

## 2020-09-17 PROCEDURE — C1729 CATH, DRAINAGE: HCPCS | Performed by: UROLOGY

## 2020-09-17 PROCEDURE — 3209999900 FLUORO FOR SURGICAL PROCEDURES

## 2020-09-17 PROCEDURE — 2720000010 HC SURG SUPPLY STERILE: Performed by: UROLOGY

## 2020-09-17 PROCEDURE — 7100000001 HC PACU RECOVERY - ADDTL 15 MIN: Performed by: UROLOGY

## 2020-09-17 PROCEDURE — 2580000003 HC RX 258: Performed by: ANESTHESIOLOGY

## 2020-09-17 PROCEDURE — 2580000003 HC RX 258: Performed by: UROLOGY

## 2020-09-17 PROCEDURE — 3700000000 HC ANESTHESIA ATTENDED CARE: Performed by: UROLOGY

## 2020-09-17 PROCEDURE — 6370000000 HC RX 637 (ALT 250 FOR IP): Performed by: UROLOGY

## 2020-09-17 PROCEDURE — 6360000002 HC RX W HCPCS: Performed by: UROLOGY

## 2020-09-17 PROCEDURE — 6360000002 HC RX W HCPCS: Performed by: ANESTHESIOLOGY

## 2020-09-17 PROCEDURE — 84703 CHORIONIC GONADOTROPIN ASSAY: CPT

## 2020-09-17 PROCEDURE — C1769 GUIDE WIRE: HCPCS | Performed by: UROLOGY

## 2020-09-17 PROCEDURE — C2628 CATHETER, OCCLUSION: HCPCS | Performed by: UROLOGY

## 2020-09-17 PROCEDURE — C1726 CATH, BAL DIL, NON-VASCULAR: HCPCS | Performed by: UROLOGY

## 2020-09-17 PROCEDURE — 6360000004 HC RX CONTRAST MEDICATION: Performed by: NURSE ANESTHETIST, CERTIFIED REGISTERED

## 2020-09-17 PROCEDURE — 6360000004 HC RX CONTRAST MEDICATION: Performed by: UROLOGY

## 2020-09-17 PROCEDURE — 2500000003 HC RX 250 WO HCPCS: Performed by: NURSE ANESTHETIST, CERTIFIED REGISTERED

## 2020-09-17 RX ORDER — BUPIVACAINE HYDROCHLORIDE 5 MG/ML
INJECTION, SOLUTION EPIDURAL; INTRACAUDAL PRN
Status: DISCONTINUED | OUTPATIENT
Start: 2020-09-17 | End: 2020-09-17 | Stop reason: HOSPADM

## 2020-09-17 RX ORDER — DEXAMETHASONE SODIUM PHOSPHATE 4 MG/ML
INJECTION, SOLUTION INTRA-ARTICULAR; INTRALESIONAL; INTRAMUSCULAR; INTRAVENOUS; SOFT TISSUE PRN
Status: DISCONTINUED | OUTPATIENT
Start: 2020-09-17 | End: 2020-09-17 | Stop reason: SDUPTHER

## 2020-09-17 RX ORDER — ACETAMINOPHEN 650 MG/1
650 SUPPOSITORY RECTAL EVERY 6 HOURS PRN
Status: DISCONTINUED | OUTPATIENT
Start: 2020-09-17 | End: 2020-09-17

## 2020-09-17 RX ORDER — ACETAMINOPHEN 325 MG/1
650 TABLET ORAL EVERY 6 HOURS PRN
Status: DISCONTINUED | OUTPATIENT
Start: 2020-09-17 | End: 2020-09-17

## 2020-09-17 RX ORDER — BUPRENORPHINE AND NALOXONE 8; 2 MG/1; MG/1
1 FILM, SOLUBLE BUCCAL; SUBLINGUAL DAILY
Status: DISCONTINUED | OUTPATIENT
Start: 2020-09-17 | End: 2020-09-18 | Stop reason: HOSPADM

## 2020-09-17 RX ORDER — ACETAMINOPHEN 500 MG
1000 TABLET ORAL EVERY 6 HOURS
Status: DISCONTINUED | OUTPATIENT
Start: 2020-09-17 | End: 2020-09-18 | Stop reason: HOSPADM

## 2020-09-17 RX ORDER — ROCURONIUM BROMIDE 10 MG/ML
INJECTION, SOLUTION INTRAVENOUS PRN
Status: DISCONTINUED | OUTPATIENT
Start: 2020-09-17 | End: 2020-09-17 | Stop reason: SDUPTHER

## 2020-09-17 RX ORDER — MEPERIDINE HYDROCHLORIDE 50 MG/ML
12.5 INJECTION INTRAMUSCULAR; INTRAVENOUS; SUBCUTANEOUS EVERY 5 MIN PRN
Status: DISCONTINUED | OUTPATIENT
Start: 2020-09-17 | End: 2020-09-17 | Stop reason: HOSPADM

## 2020-09-17 RX ORDER — MORPHINE SULFATE 2 MG/ML
1 INJECTION, SOLUTION INTRAMUSCULAR; INTRAVENOUS EVERY 5 MIN PRN
Status: DISCONTINUED | OUTPATIENT
Start: 2020-09-17 | End: 2020-09-17 | Stop reason: HOSPADM

## 2020-09-17 RX ORDER — PROPOFOL 10 MG/ML
INJECTION, EMULSION INTRAVENOUS PRN
Status: DISCONTINUED | OUTPATIENT
Start: 2020-09-17 | End: 2020-09-17 | Stop reason: SDUPTHER

## 2020-09-17 RX ORDER — DIPHENHYDRAMINE HCL 25 MG
50 TABLET ORAL NIGHTLY PRN
Status: DISCONTINUED | OUTPATIENT
Start: 2020-09-17 | End: 2020-09-17

## 2020-09-17 RX ORDER — HYDROMORPHONE HCL 110MG/55ML
0.25 PATIENT CONTROLLED ANALGESIA SYRINGE INTRAVENOUS
Status: DISCONTINUED | OUTPATIENT
Start: 2020-09-17 | End: 2020-09-18 | Stop reason: HOSPADM

## 2020-09-17 RX ORDER — POLYETHYLENE GLYCOL 3350 17 G/17G
17 POWDER, FOR SOLUTION ORAL DAILY PRN
Status: DISCONTINUED | OUTPATIENT
Start: 2020-09-17 | End: 2020-09-18 | Stop reason: HOSPADM

## 2020-09-17 RX ORDER — ONDANSETRON 2 MG/ML
INJECTION INTRAMUSCULAR; INTRAVENOUS PRN
Status: DISCONTINUED | OUTPATIENT
Start: 2020-09-17 | End: 2020-09-17 | Stop reason: SDUPTHER

## 2020-09-17 RX ORDER — LABETALOL HYDROCHLORIDE 5 MG/ML
5 INJECTION, SOLUTION INTRAVENOUS EVERY 10 MIN PRN
Status: DISCONTINUED | OUTPATIENT
Start: 2020-09-17 | End: 2020-09-17 | Stop reason: HOSPADM

## 2020-09-17 RX ORDER — LIDOCAINE HYDROCHLORIDE 20 MG/ML
INJECTION, SOLUTION INFILTRATION; PERINEURAL PRN
Status: DISCONTINUED | OUTPATIENT
Start: 2020-09-17 | End: 2020-09-17 | Stop reason: SDUPTHER

## 2020-09-17 RX ORDER — SODIUM CHLORIDE 0.9 % (FLUSH) 0.9 %
10 SYRINGE (ML) INJECTION EVERY 12 HOURS SCHEDULED
Status: DISCONTINUED | OUTPATIENT
Start: 2020-09-17 | End: 2020-09-18 | Stop reason: HOSPADM

## 2020-09-17 RX ORDER — EPHEDRINE SULFATE 50 MG/ML
INJECTION INTRAVENOUS PRN
Status: DISCONTINUED | OUTPATIENT
Start: 2020-09-17 | End: 2020-09-17 | Stop reason: SDUPTHER

## 2020-09-17 RX ORDER — OXYCODONE HYDROCHLORIDE AND ACETAMINOPHEN 5; 325 MG/1; MG/1
2 TABLET ORAL PRN
Status: DISCONTINUED | OUTPATIENT
Start: 2020-09-17 | End: 2020-09-17 | Stop reason: HOSPADM

## 2020-09-17 RX ORDER — CIPROFLOXACIN 2 MG/ML
400 INJECTION, SOLUTION INTRAVENOUS EVERY 12 HOURS
Status: DISCONTINUED | OUTPATIENT
Start: 2020-09-17 | End: 2020-09-18 | Stop reason: HOSPADM

## 2020-09-17 RX ORDER — FENTANYL CITRATE 50 UG/ML
INJECTION, SOLUTION INTRAMUSCULAR; INTRAVENOUS PRN
Status: DISCONTINUED | OUTPATIENT
Start: 2020-09-17 | End: 2020-09-17 | Stop reason: SDUPTHER

## 2020-09-17 RX ORDER — HYDROMORPHONE HCL 110MG/55ML
PATIENT CONTROLLED ANALGESIA SYRINGE INTRAVENOUS PRN
Status: DISCONTINUED | OUTPATIENT
Start: 2020-09-17 | End: 2020-09-17 | Stop reason: SDUPTHER

## 2020-09-17 RX ORDER — IBUPROFEN 600 MG/1
600 TABLET ORAL EVERY 6 HOURS PRN
Status: ON HOLD | COMMUNITY
End: 2020-09-18 | Stop reason: SDUPTHER

## 2020-09-17 RX ORDER — DIPHENHYDRAMINE HYDROCHLORIDE 50 MG/ML
12.5 INJECTION INTRAMUSCULAR; INTRAVENOUS
Status: DISCONTINUED | OUTPATIENT
Start: 2020-09-17 | End: 2020-09-17 | Stop reason: HOSPADM

## 2020-09-17 RX ORDER — QUETIAPINE FUMARATE 50 MG/1
150 TABLET, EXTENDED RELEASE ORAL NIGHTLY
Status: DISCONTINUED | OUTPATIENT
Start: 2020-09-17 | End: 2020-09-18 | Stop reason: HOSPADM

## 2020-09-17 RX ORDER — HYDRALAZINE HYDROCHLORIDE 20 MG/ML
5 INJECTION INTRAMUSCULAR; INTRAVENOUS EVERY 10 MIN PRN
Status: DISCONTINUED | OUTPATIENT
Start: 2020-09-17 | End: 2020-09-17 | Stop reason: HOSPADM

## 2020-09-17 RX ORDER — TRAMADOL HYDROCHLORIDE 50 MG/1
100 TABLET ORAL EVERY 6 HOURS PRN
Status: DISCONTINUED | OUTPATIENT
Start: 2020-09-17 | End: 2020-09-18 | Stop reason: HOSPADM

## 2020-09-17 RX ORDER — ONDANSETRON 2 MG/ML
4 INJECTION INTRAMUSCULAR; INTRAVENOUS PRN
Status: DISCONTINUED | OUTPATIENT
Start: 2020-09-17 | End: 2020-09-17 | Stop reason: HOSPADM

## 2020-09-17 RX ORDER — ALBUTEROL SULFATE 2.5 MG/3ML
2.5 SOLUTION RESPIRATORY (INHALATION) EVERY 6 HOURS PRN
Status: DISCONTINUED | OUTPATIENT
Start: 2020-09-17 | End: 2020-09-18 | Stop reason: HOSPADM

## 2020-09-17 RX ORDER — SODIUM CHLORIDE, SODIUM LACTATE, POTASSIUM CHLORIDE, CALCIUM CHLORIDE 600; 310; 30; 20 MG/100ML; MG/100ML; MG/100ML; MG/100ML
INJECTION, SOLUTION INTRAVENOUS CONTINUOUS
Status: DISCONTINUED | OUTPATIENT
Start: 2020-09-17 | End: 2020-09-17

## 2020-09-17 RX ORDER — TRAMADOL HYDROCHLORIDE 50 MG/1
50 TABLET ORAL EVERY 6 HOURS PRN
Status: DISCONTINUED | OUTPATIENT
Start: 2020-09-17 | End: 2020-09-18 | Stop reason: HOSPADM

## 2020-09-17 RX ORDER — MORPHINE SULFATE 2 MG/ML
2 INJECTION, SOLUTION INTRAMUSCULAR; INTRAVENOUS EVERY 5 MIN PRN
Status: DISCONTINUED | OUTPATIENT
Start: 2020-09-17 | End: 2020-09-17 | Stop reason: HOSPADM

## 2020-09-17 RX ORDER — SODIUM CHLORIDE 9 MG/ML
INJECTION, SOLUTION INTRAVENOUS CONTINUOUS
Status: DISCONTINUED | OUTPATIENT
Start: 2020-09-17 | End: 2020-09-18

## 2020-09-17 RX ORDER — SODIUM CHLORIDE 0.9 % (FLUSH) 0.9 %
10 SYRINGE (ML) INJECTION PRN
Status: DISCONTINUED | OUTPATIENT
Start: 2020-09-17 | End: 2020-09-18 | Stop reason: HOSPADM

## 2020-09-17 RX ORDER — KETOROLAC TROMETHAMINE 30 MG/ML
30 INJECTION, SOLUTION INTRAMUSCULAR; INTRAVENOUS EVERY 6 HOURS
Status: DISCONTINUED | OUTPATIENT
Start: 2020-09-17 | End: 2020-09-18 | Stop reason: HOSPADM

## 2020-09-17 RX ORDER — ONDANSETRON 2 MG/ML
4 INJECTION INTRAMUSCULAR; INTRAVENOUS EVERY 6 HOURS PRN
Status: DISCONTINUED | OUTPATIENT
Start: 2020-09-17 | End: 2020-09-18 | Stop reason: HOSPADM

## 2020-09-17 RX ORDER — SENNA AND DOCUSATE SODIUM 50; 8.6 MG/1; MG/1
1 TABLET, FILM COATED ORAL 2 TIMES DAILY
Status: DISCONTINUED | OUTPATIENT
Start: 2020-09-17 | End: 2020-09-18 | Stop reason: HOSPADM

## 2020-09-17 RX ORDER — OXYCODONE HYDROCHLORIDE AND ACETAMINOPHEN 5; 325 MG/1; MG/1
1 TABLET ORAL PRN
Status: DISCONTINUED | OUTPATIENT
Start: 2020-09-17 | End: 2020-09-17 | Stop reason: HOSPADM

## 2020-09-17 RX ORDER — MIDAZOLAM HYDROCHLORIDE 1 MG/ML
INJECTION INTRAMUSCULAR; INTRAVENOUS PRN
Status: DISCONTINUED | OUTPATIENT
Start: 2020-09-17 | End: 2020-09-17 | Stop reason: SDUPTHER

## 2020-09-17 RX ORDER — OXYCODONE HYDROCHLORIDE AND ACETAMINOPHEN 5; 325 MG/1; MG/1
1 TABLET ORAL EVERY 4 HOURS PRN
Status: DISCONTINUED | OUTPATIENT
Start: 2020-09-17 | End: 2020-09-17

## 2020-09-17 RX ORDER — HYDROMORPHONE HCL 110MG/55ML
0.5 PATIENT CONTROLLED ANALGESIA SYRINGE INTRAVENOUS
Status: DISCONTINUED | OUTPATIENT
Start: 2020-09-17 | End: 2020-09-18 | Stop reason: HOSPADM

## 2020-09-17 RX ORDER — DIPHENHYDRAMINE HCL 50 MG
50 CAPSULE ORAL NIGHTLY PRN
COMMUNITY

## 2020-09-17 RX ORDER — PROMETHAZINE HYDROCHLORIDE 25 MG/ML
6.25 INJECTION, SOLUTION INTRAMUSCULAR; INTRAVENOUS
Status: DISCONTINUED | OUTPATIENT
Start: 2020-09-17 | End: 2020-09-17 | Stop reason: HOSPADM

## 2020-09-17 RX ADMIN — MIDAZOLAM HYDROCHLORIDE 2 MG: 2 INJECTION, SOLUTION INTRAMUSCULAR; INTRAVENOUS at 13:13

## 2020-09-17 RX ADMIN — SODIUM CHLORIDE, POTASSIUM CHLORIDE, SODIUM LACTATE AND CALCIUM CHLORIDE: 600; 310; 30; 20 INJECTION, SOLUTION INTRAVENOUS at 14:24

## 2020-09-17 RX ADMIN — GENTAMICIN SULFATE 120 MG: 40 INJECTION, SOLUTION INTRAMUSCULAR; INTRAVENOUS at 17:37

## 2020-09-17 RX ADMIN — ROCURONIUM BROMIDE 10 MG: 10 SOLUTION INTRAVENOUS at 14:30

## 2020-09-17 RX ADMIN — EPHEDRINE SULFATE 10 MG: 50 INJECTION INTRAVENOUS at 14:15

## 2020-09-17 RX ADMIN — HYDROMORPHONE HYDROCHLORIDE 0.5 MG: 2 INJECTION, SOLUTION INTRAMUSCULAR; INTRAVENOUS; SUBCUTANEOUS at 19:18

## 2020-09-17 RX ADMIN — QUETIAPINE FUMARATE 150 MG: 50 TABLET, EXTENDED RELEASE ORAL at 21:31

## 2020-09-17 RX ADMIN — SODIUM CHLORIDE, POTASSIUM CHLORIDE, SODIUM LACTATE AND CALCIUM CHLORIDE: 600; 310; 30; 20 INJECTION, SOLUTION INTRAVENOUS at 12:40

## 2020-09-17 RX ADMIN — HYDROMORPHONE HYDROCHLORIDE 0.5 MG: 1 INJECTION, SOLUTION INTRAMUSCULAR; INTRAVENOUS; SUBCUTANEOUS at 15:32

## 2020-09-17 RX ADMIN — HYDROMORPHONE HYDROCHLORIDE 0.5 MG: 1 INJECTION, SOLUTION INTRAMUSCULAR; INTRAVENOUS; SUBCUTANEOUS at 16:02

## 2020-09-17 RX ADMIN — HYDROMORPHONE HYDROCHLORIDE 0.5 MG: 2 INJECTION INTRAMUSCULAR; INTRAVENOUS; SUBCUTANEOUS at 15:23

## 2020-09-17 RX ADMIN — LIDOCAINE HYDROCHLORIDE 60 MG: 20 INJECTION, SOLUTION INFILTRATION; PERINEURAL at 13:19

## 2020-09-17 RX ADMIN — ONDANSETRON 4 MG: 2 INJECTION INTRAMUSCULAR; INTRAVENOUS at 13:31

## 2020-09-17 RX ADMIN — Medication 10 ML: at 22:20

## 2020-09-17 RX ADMIN — DOCUSATE SODIUM 50 MG AND SENNOSIDES 8.6 MG 1 TABLET: 8.6; 5 TABLET, FILM COATED ORAL at 21:31

## 2020-09-17 RX ADMIN — BUPRENORPHINE HYDROCHLORIDE, NALOXONE HYDROCHLORIDE 1 FILM: 8; 2 FILM, SOLUBLE BUCCAL; SUBLINGUAL at 19:17

## 2020-09-17 RX ADMIN — HYDROMORPHONE HYDROCHLORIDE 0.5 MG: 1 INJECTION, SOLUTION INTRAMUSCULAR; INTRAVENOUS; SUBCUTANEOUS at 16:29

## 2020-09-17 RX ADMIN — ONDANSETRON 4 MG: 2 INJECTION INTRAMUSCULAR; INTRAVENOUS at 15:11

## 2020-09-17 RX ADMIN — HYDROMORPHONE HYDROCHLORIDE 0.5 MG: 2 INJECTION INTRAMUSCULAR; INTRAVENOUS; SUBCUTANEOUS at 15:29

## 2020-09-17 RX ADMIN — KETOROLAC TROMETHAMINE 30 MG: 30 INJECTION, SOLUTION INTRAMUSCULAR at 19:18

## 2020-09-17 RX ADMIN — CEFAZOLIN SODIUM 2 G: 10 INJECTION, POWDER, FOR SOLUTION INTRAVENOUS at 13:15

## 2020-09-17 RX ADMIN — HYDROMORPHONE HYDROCHLORIDE 0.5 MG: 2 INJECTION, SOLUTION INTRAMUSCULAR; INTRAVENOUS; SUBCUTANEOUS at 22:20

## 2020-09-17 RX ADMIN — PROPOFOL 200 MG: 10 INJECTION, EMULSION INTRAVENOUS at 13:19

## 2020-09-17 RX ADMIN — HYDROMORPHONE HYDROCHLORIDE 0.5 MG: 1 INJECTION, SOLUTION INTRAMUSCULAR; INTRAVENOUS; SUBCUTANEOUS at 15:41

## 2020-09-17 RX ADMIN — ROCURONIUM BROMIDE 50 MG: 10 SOLUTION INTRAVENOUS at 13:19

## 2020-09-17 RX ADMIN — IOVERSOL 100 ML: 678 INJECTION INTRA-ARTERIAL; INTRAVENOUS at 14:04

## 2020-09-17 RX ADMIN — FENTANYL CITRATE 100 MCG: 50 INJECTION INTRAMUSCULAR; INTRAVENOUS at 13:19

## 2020-09-17 RX ADMIN — SUGAMMADEX 200 MG: 100 INJECTION, SOLUTION INTRAVENOUS at 15:11

## 2020-09-17 RX ADMIN — DEXAMETHASONE SODIUM PHOSPHATE 10 MG: 4 INJECTION, SOLUTION INTRAMUSCULAR; INTRAVENOUS at 13:31

## 2020-09-17 ASSESSMENT — PULMONARY FUNCTION TESTS
PIF_VALUE: 27
PIF_VALUE: 29
PIF_VALUE: 31
PIF_VALUE: 30
PIF_VALUE: 32
PIF_VALUE: 35
PIF_VALUE: 32
PIF_VALUE: 31
PIF_VALUE: 32
PIF_VALUE: 31
PIF_VALUE: 35
PIF_VALUE: 1
PIF_VALUE: 31
PIF_VALUE: 31
PIF_VALUE: 1
PIF_VALUE: 26
PIF_VALUE: 27
PIF_VALUE: 29
PIF_VALUE: 31
PIF_VALUE: 31
PIF_VALUE: 34
PIF_VALUE: 4
PIF_VALUE: 35
PIF_VALUE: 30
PIF_VALUE: 31
PIF_VALUE: 24
PIF_VALUE: 31
PIF_VALUE: 23
PIF_VALUE: 28
PIF_VALUE: 2
PIF_VALUE: 32
PIF_VALUE: 27
PIF_VALUE: 34
PIF_VALUE: 1
PIF_VALUE: 34
PIF_VALUE: 32
PIF_VALUE: 31
PIF_VALUE: 33
PIF_VALUE: 30
PIF_VALUE: 32
PIF_VALUE: 32
PIF_VALUE: 33
PIF_VALUE: 33
PIF_VALUE: 34
PIF_VALUE: 31
PIF_VALUE: 33
PIF_VALUE: 25
PIF_VALUE: 25
PIF_VALUE: 1
PIF_VALUE: 1
PIF_VALUE: 27
PIF_VALUE: 32
PIF_VALUE: 36
PIF_VALUE: 31
PIF_VALUE: 33
PIF_VALUE: 32
PIF_VALUE: 33
PIF_VALUE: 33
PIF_VALUE: 31
PIF_VALUE: 21
PIF_VALUE: 32
PIF_VALUE: 31
PIF_VALUE: 34
PIF_VALUE: 34
PIF_VALUE: 33
PIF_VALUE: 34
PIF_VALUE: 29
PIF_VALUE: 35
PIF_VALUE: 31
PIF_VALUE: 22
PIF_VALUE: 35
PIF_VALUE: 24
PIF_VALUE: 35
PIF_VALUE: 27
PIF_VALUE: 32
PIF_VALUE: 32
PIF_VALUE: 26
PIF_VALUE: 29
PIF_VALUE: 31
PIF_VALUE: 31
PIF_VALUE: 32
PIF_VALUE: 2
PIF_VALUE: 30
PIF_VALUE: 28
PIF_VALUE: 33
PIF_VALUE: 24
PIF_VALUE: 33
PIF_VALUE: 32
PIF_VALUE: 31
PIF_VALUE: 34
PIF_VALUE: 2
PIF_VALUE: 32
PIF_VALUE: 26
PIF_VALUE: 1
PIF_VALUE: 31
PIF_VALUE: 34
PIF_VALUE: 21
PIF_VALUE: 31
PIF_VALUE: 27
PIF_VALUE: 28
PIF_VALUE: 31
PIF_VALUE: 37
PIF_VALUE: 35
PIF_VALUE: 33
PIF_VALUE: 5
PIF_VALUE: 8
PIF_VALUE: 32
PIF_VALUE: 31
PIF_VALUE: 34
PIF_VALUE: 32
PIF_VALUE: 35
PIF_VALUE: 32
PIF_VALUE: 31
PIF_VALUE: 32
PIF_VALUE: 26
PIF_VALUE: 35
PIF_VALUE: 8
PIF_VALUE: 30
PIF_VALUE: 31
PIF_VALUE: 34
PIF_VALUE: 31
PIF_VALUE: 41
PIF_VALUE: 31
PIF_VALUE: 27

## 2020-09-17 ASSESSMENT — PAIN DESCRIPTION - LOCATION
LOCATION: FLANK

## 2020-09-17 ASSESSMENT — PAIN DESCRIPTION - PAIN TYPE
TYPE: SURGICAL PAIN
TYPE: ACUTE PAIN

## 2020-09-17 ASSESSMENT — PAIN DESCRIPTION - ORIENTATION
ORIENTATION: LEFT

## 2020-09-17 ASSESSMENT — PAIN SCALES - GENERAL
PAINLEVEL_OUTOF10: 6
PAINLEVEL_OUTOF10: 7
PAINLEVEL_OUTOF10: 10

## 2020-09-17 ASSESSMENT — PAIN DESCRIPTION - DESCRIPTORS: DESCRIPTORS: DISCOMFORT

## 2020-09-17 ASSESSMENT — PAIN - FUNCTIONAL ASSESSMENT: PAIN_FUNCTIONAL_ASSESSMENT: 0-10

## 2020-09-17 NOTE — PROGRESS NOTES
Patient admitted from OR to PACU. Bedside report received. Patient immediately hooked up to heart monitor. Yesika Joyner

## 2020-09-17 NOTE — H&P
Referring Provider:  No ref. provider found   Primary Care Provider:  SANTIAGO Sheppard       Urology Attending H/P Note     Reason for H/P: surgery and as below    HPI:  Diony Sy is a 41 y.o. female who presented with history of large left renal stones. See office notes for further details. Past Medical History:   Diagnosis Date    Acute ischemic colitis ([facility] 10/20/15    Anesthesia complication     wakes from anesthesia with migraine    Asthma     Had real bad as child and then grew out of it and  then got pregnant it started acting up again.     Bipolar 1 disorder ([facility]     Bipolar affective disorder, current episode depressed ([facility] [address] 5/28/2015    Depression     Kidney stones     has had multiple kidney stones    Migraine     Nodule of left lung     pt has non calcified nodules on both lungs    Nodule of right lung     Opiate addiction ([facility] [address] [address]     Past hx    Wears contact lenses        Past Surgical History:   Procedure Laterality Date    COLONOSCOPY  10/20/15    possible ischemic colitis    CYSTOSCOPY Left 08/13/2020    left uretroscopy, with stone extraction    CYSTOSCOPY INSERTION / REMOVAL STENT / STONE Left 8/13/2020    CYSTOSCOPY, URETEROSCOPY, HOLMIUM LASER LITHOTRIPSY, STONE EXTRACTION performed by Muna Easton MD at [place] [address]  10/11/2016    Hysteroscopy, Novasure Ablation    LITHOTRIPSY      x 7    PERCUTANEOUS BALLOON VALVULOPLASTY  2007    TUBAL LIGATION  2001    URETER STENT PLACEMENT      X 2 then removed       Current epic outpatient medication list reviewed as well as inpatient meds:       Current Facility-Administered Medications on File Prior to Encounter   Medication Dose Route Frequency Provider Last Rate Last Dose    lactated ringers infusion   Intravenous Continuous Randa Dupree MD        sodium chloride flush 0.9 % injection 10 mL  10 mL Intravenous 2 times per day Randa Dupree MD        sodium chloride flush 0.9 % injection 10 mL  10 mL Intravenous PRN Ron Walker MD         Current Outpatient Medications on File Prior to Encounter   Medication Sig Dispense Refill    ibuprofen (ADVIL;MOTRIN) 600 MG tablet Take 600 mg by mouth every 6 hours as needed for Pain      diphenhydrAMINE (BENADRYL) 50 MG capsule Take 50 mg by mouth nightly as needed for Sleep      albuterol sulfate  (90 Base) MCG/ACT inhaler Inhale 2 puffs into the lungs every 4 hours as needed for Wheezing May Sub as needed per insurance. 1 Inhaler 5    buprenorphine-naloxone (SUBOXONE) 8-2 MG SUBL SL tablet Place 1 tablet under the tongue daily.  Pt takes 12 mg daily      QUEtiapine (SEROQUEL XR) 150 MG TB24 extended release tablet Take 150 mg by mouth nightly         Current Facility-Administered Medications   Medication Dose Route Frequency Provider Last Rate Last Dose    lactated ringers infusion   Intravenous Continuous Shen Wells MD        lidocaine 1 % (PF) injection 0.1 mL  0.1 mL Intradermal Once PRN Shen Wells MD        HYDROmorphone (DILAUDID) injection 0.25 mg  0.25 mg Intravenous Q5 Min PRN Jeremy Saunders MD        HYDROmorphone (DILAUDID) injection 0.5 mg  0.5 mg Intravenous Q5 Min PRN Jeremy Saunders MD        morphine (PF) injection 1 mg  1 mg Intravenous Q5 Min PRN Jeremy Saunders MD        morphine (PF) injection 2 mg  2 mg Intravenous Q5 Min PRN Jeremy Saunders MD        oxyCODONE-acetaminophen (PERCOCET) 5-325 MG per tablet 1 tablet  1 tablet Oral PRN Jeremy Saunders MD        Or   [employer] oxyCODONE-acetaminophen (PERCOCET) 5-325 MG per tablet 2 tablet  2 tablet Oral PRN Jeremy Saunders MD        diphenhydrAMINE (BENADRYL) injection 12.5 mg  12.5 mg Intravenous Once PRN Jeremy Saunders MD        ondansetron [facility]) injection 4 mg  4 mg Intravenous PRN Jeremy Saunders MD        promethazine (PHENERGAN) injection 6.25 mg  6.25 mg Intravenous Q15 Min PRN Matthew Case MD Mark        labetalol (NORMODYNE;TRANDATE) injection 5 mg  5 mg Intravenous Q10 Min PRN Wilman Fong MD        hydrALAZINE (APRESOLINE) injection 5 mg  5 mg Intravenous Q10 Min PRN Wilman Fong MD        meperidine (DEMEROL) injection 12.5 mg  12.5 mg Intravenous Q5 Min PRN Wilman Fong MD         Facility-Administered Medications Ordered in Other Encounters   Medication Dose Route Frequency Provider Last Rate Last Dose    lactated ringers infusion   Intravenous Continuous Tommy Kang MD        sodium chloride flush 0.9 % injection 10 mL  10 mL Intravenous 2 times per day Tommy Kang MD        sodium chloride flush 0.9 % injection 10 mL  10 mL Intravenous PRN Tommy Kang MD           No Known Allergies    Family History   Problem Relation Age of Onset    High Blood Pressure Father     Kidney Disease Father     Cancer Maternal Grandmother     Cancer Maternal Grandfather     Kidney Disease Maternal Grandfather     Cancer Paternal Grandmother     Cancer Paternal Grandfather     Cancer Other 3        ALL    Diabetes Maternal Uncle     COPD Mother     Depression Sister     Ovarian Cancer Sister        Social History     Tobacco Use    Smoking status: Current Every Day Smoker     Packs/day: 1.00     Years: 21.00     Pack years: 21.00     Types: Cigarettes     Start date: 4/15/1995    Smokeless tobacco: Never Used   Substance Use Topics    Alcohol use: Not Currently     Comment: quit 2 months ago.  Drug use: Yes     Types: Marijuana     Comment: occas         Review of Systems: A 12 point ROS was performed and was unremarkable unless listed in the history of present illness. No intake/output data recorded.     Physical Exam:  Patient Vitals for the past 8 hrs:   BP Temp Temp src Pulse Resp SpO2 Height Weight   09/17/20 1139 (!) 141/61 96.6 °F (35.9 °C) Temporal 94 16 96 % 5' 1\" (1.549 m) 185 lb 8 oz (84.1 kg)     Constitutional: pleasant patient in NAD, with a nl body habitus   Eyes: pink conjunctivae, no ptosis  ENT: lips without cyanosis, normal appearance of ears and nose externally  Neck: no masses or lesions, trachea midline  Respiratory: normal respiratory movements without distress  Cardiovascular: regular rate, lower extremities without edema   Abdomen: soft, NTND, no masses, no rebound or guarding  Skin: warm and dry, no rashes, lesions, or ulcers  Musculoskeletal: normal ROM, m.tone, no digital cyanosis, head normocephalic  Psych: normal mood and affect, alert and appropriately answers questions  : bladder not palpable, normal, no quick catheter    Labs:    Lab Results   Component Value Date    CREATININE 1.0 08/21/2020    CREATININE 1.0 08/13/2020    CREATININE 0.9 08/12/2020     Lab Results   Component Value Date    COLORU Yellow 08/21/2020    NITRU Negative 08/21/2020    GLUCOSEU Negative 08/21/2020    GLUCOSEU Negative 07/23/2010    KETUA Negative 08/21/2020    UROBILINOGEN 0.2 08/21/2020    BILIRUBINUR Negative 08/21/2020    BILIRUBINUR Negative 07/23/2010     Lab Results   Component Value Date    WBC 6.1 08/21/2020    HGB 15.5 08/21/2020    HCT 46.9 08/21/2020    .1 (H) 08/21/2020     08/21/2020          Impression:  Left renal calculi     Plan:  R/b/a of surgery re-reviewed.   See office notes for further details    We will proceed with LEFT pcnl, poss cysto /stent    Isai Manrique MD  Office: [phone]

## 2020-09-17 NOTE — PROGRESS NOTES
4 Eyes Skin Assessment     The patient is being assess for   Post-Op Surgical    I agree that 2 RN's have performed a thorough Head to Toe Skin Assessment on the patient. ALL assessment sites listed below have been assessed. Areas assessed by both nurses:   [x]   Head, Face, and Ears   [x]   Shoulders, Back, and Chest, Abdomen  [x]   Arms, Elbows, and Hands   [x]   Coccyx, Sacrum, and Ischium  [x]   Legs, Feet, and Heels        No skin issues    **SHARE this note so that the co-signing nurse is able to place an eSignature**    Co-signer eSignature: Electronically signed by Brian Marcial RN on 9/17/20 at 5:14 PM EDT    Does the Patient have Skin Breakdown?   No         Kennedy Prevention initiated:  No   Wound Care Orders initiated:  No      [facility] nurse consulted for Pressure Injury (Stage 3,4, Unstageable, DTI, NWPT, Complex wounds)and New or Established Ostomies:  No      Primary Nurse eSignature: Electronically signed by Alfa Hussein RN on 9/18/20 at 9:59 AM EDT

## 2020-09-17 NOTE — PROGRESS NOTES
RESPIRATORY THERAPY ASSESSMENT    Name:  Kell Herrera  Record Number:  [id number]  Age: 41 y.o. Gender: female  : 1980  Today's Date:  2020  Room:  0360/0360-01    Assessment     Is the patient being admitted for a COPD or Asthma exacerbation?  no  (If yes the patient will be seen every 4 hours for the first 24 hours and then reassessed)    Patient Admission Diagnosis      Allergies  No Known Allergies    Minimum Predicted Vital Capacity:     0          Actual Vital Capacity:      0              Pulmonary History:Asthma  Home Oxygen Therapy:  room air  Home Respiratory Therapy:Albuterol   Current Respiratory Therapy:  Albuterol [facility]          Respiratory Severity Index(RSI)   Patients with orders for inhalation medications, oxygen, or any therapeutic treatment modality will be placed on Respiratory Protocol. They will be assessed with the first treatment and at least every 72 hours thereafter. The following severity scale will be used to determine frequency of treatment intervention. Smoking History: Pulmonary Disease or Smoking History, Greater than 15 pack year = 2    Social History  Social History     Tobacco Use    Smoking status: Current Every Day Smoker     Packs/day: 1.00     Years: 21.00     Pack years: 21.00     Types: Cigarettes     Start date: 4/15/1995    Smokeless tobacco: Never Used   Substance Use Topics    Alcohol use: Not Currently     Comment: quit 2 months ago.      Drug use: Yes     Types: Marijuana     Comment: occas       Recent Surgical History: Lower Abdominal = 2  Past Surgical History  Past Surgical History:   Procedure Laterality Date    COLONOSCOPY  10/20/15    possible ischemic colitis    CYSTOSCOPY Left 2020    left uretroscopy, with stone extraction    CYSTOSCOPY INSERTION / REMOVAL STENT / STONE Left 2020    CYSTOSCOPY, URETEROSCOPY, HOLMIUM LASER LITHOTRIPSY, STONE EXTRACTION performed by Jose Carlos Aviles MD at [address] [place] [address] DILATION AND CURETTAGE OF UTERUS  10/11/2016    Hysteroscopy, Novasure Ablation    LITHOTRIPSY      x 7    PERCUTANEOUS BALLOON VALVULOPLASTY  2007    TUBAL LIGATION  2001    URETER STENT PLACEMENT      X 2 then removed       Level of Consciousness: Alert, Oriented, and Cooperative = 0    Level of Activity: Walking unassisted = 0    Respiratory Pattern: Regular Pattern; RR 8-20 = 0    Breath Sounds: Clear = 0    Sputum   ,  ,    Cough: Strong, spontaneous, non-productive = 0    Vital Signs   /65   Pulse 69   Temp 97.7 °F (36.5 °C) (Oral)   Resp 18   Ht 5' 1\" (1.549 m)   Wt 185 lb 8 oz (84.1 kg)   SpO2 98%   BMI 35.05 kg/m²   SPO2 (COPD values may differ): 90-91% on room air or greater than 92% on FiO2 24- 28% = 1    Peak Flow (asthma only): not applicable = 0    RSI: 5-6 = Q4hr PRN (every four hours as needed) for dyspnea        Plan       Goals: medication delivery, mobilize retained secretions, volume expansion and improve oxygenation    Patient/caregiver was educated on the proper method of use for Respiratory Care Devices:  Yes      Level of patient/caregiver understanding able to:   ? Verbalize understanding   ? Demonstrate understanding       ? Teach back        ? Needs reinforcement       ? No available caregiver               ? Other:     Response to education:  Good     Is patient being placed on Home Treatment Regimen? Yes     Does the patient have everything they need prior to discharge? Yes     Comments: chart reviewed    Plan of Care: cont home regimen    Electronically signed by Marycarmen Perez RCP on 9/17/2020 at 7:18 PM    Respiratory Protocol Guidelines     1. Assessment and treatment by Respiratory Therapy will be initiated for medication and therapeutic interventions upon initiation of aerosolized medication. 2. Physician will be contacted for respiratory rate (RR) greater than 35 breaths per minute.  Therapy will be held for heart rate (HR) greater than 140 beats per minute, pending direction from physician. 3. Bronchodilators will be administered via Metered Dose Inhaler (MDI) with spacer when the following criteria are met:  a. Alert and cooperative     b. HR < 140 bpm  c. RR < 30 bpm                d. Can demonstrate a 2-3 second inspiratory hold  4. Bronchodilators will be administered via Hand Held Nebulizer [facility] to patients when ANY of the following criteria are met  a. Incognizant or uncooperative          b. Patients treated with HHN at Home        c. Unable to demonstrate proper use of MDI with spacer     d. RR > 30 bpm   5. Bronchodilators will be delivered via Metered Dose Inhaler (MDI), HHN, Aerogen to intubated patients on mechanical ventilation. 6. Inhalation medication orders will be delivered and/or substituted as outlined below. Aerosolized Medications Ordering and Administration Guidelines:    1. All Medications will be ordered by a physician, and their frequency and/or modality will be adjusted as defined by the patients Respiratory Severity Index (RSI) score. 2. If the patient does not have documented COPD, consider discontinuing anticholinergics when RSI is less than 9.  3. If the bronchospasm worsens (increased RSI), then the bronchodilator frequency can be increased to a maximum of every 4 hours. If greater than every 4 hours is required, the physician will be contacted. 4. If the bronchospasm improves, the frequency of the bronchodilator can be decreased, based on the patient's RSI, but not less than home treatment regimen frequency. 5. Bronchodilator(s) will be discontinued if patient has a RSI less than 9 and has received no scheduled or as needed treatment for 72  Hrs. Patients Ordered on a Mucolytic Agent:    1. Must always be administered with a bronchodilator. 2. Discontinue if patient experiences worsened bronchospasm, or secretions have lessened to the point that the patient is able to clear them with a cough.     Anti-inflammatory and

## 2020-09-17 NOTE — ANESTHESIA PRE PROCEDURE
No Known Allergies    Problem List:    Patient Active Problem List   Diagnosis Code    Bipolar 1 disorder ([address] F31.9    Rectal bleeding K62.5    Primary insomnia F51.01    Localized edema R60.0    Bloating R14.0    Ischemic colitis ([address] K55.9    Abnormal uterine bleeding (AUB) N93.9    Bipolar 2 disorder (HCC) F31.81    Colitis K52.9    Alcohol abuse F10.10    MDD (major depressive disorder), recurrent episode, mild ([address] F33.0    MDD (major depressive disorder), recurrent episode, moderate ([address] F33.1    Transaminitis R74.0    Alcohol withdrawal syndrome with complication ([address] S57.905    Intractable migraine without status migrainosus G43.919    Mild intermittent asthma without complication J92.37    Difficulty sleeping G47.9    Bipolar disorder (HCC) F31.9    Polysubstance dependence in early, early partial, sustained full, or sustained partial remission (HCC) F19.21    Tremors of nervous system R25.1    Nephrolithiasis N20.0    Ureterolithiasis N20.1    Hydronephrosis concurrent with and due to calculi of kidney and ureter N13.2       Past Medical History:        Diagnosis Date    Acute ischemic colitis ([address] 10/20/15    Anesthesia complication     wakes from anesthesia with migraine    Asthma     Had real bad as child and then grew out of it and  then got pregnant it started acting up again.     Bipolar 1 disorder ([address]     Bipolar affective disorder, current episode depressed ([address] 5/28/2015    Depression     Kidney stones     has had multiple kidney stones    Migraine     Nodule of left lung     pt has non calcified nodules on both lungs    Nodule of right lung     Opiate addiction ([address]     Past hx    Wears contact lenses        Past Surgical History:        Procedure Laterality Date    COLONOSCOPY  10/20/15    possible ischemic colitis    CYSTOSCOPY Left 08/13/2020    left uretroscopy, with stone extraction    CYSTOSCOPY INSERTION / REMOVAL STENT / STONE Left 8/13/2020    CYSTOSCOPY, URETEROSCOPY, HOLMIUM LASER LITHOTRIPSY, STONE EXTRACTION performed by Reji Owen MD at [place] [address]  10/11/2016    Hysteroscopy, Novasure Ablation    LITHOTRIPSY      x 7    PERCUTANEOUS BALLOON VALVULOPLASTY  2007    TUBAL LIGATION  2001    URETER STENT PLACEMENT      X 2 then removed       Social History:    Social History     Tobacco Use    Smoking status: Current Every Day Smoker     Packs/day: 1.00     Years: 21.00     Pack years: 21.00     Types: Cigarettes     Start date: 4/15/1995    Smokeless tobacco: Never Used   Substance Use Topics    Alcohol use: Not Currently     Comment: quit 2 months ago. Ready to quit: Not Answered  Counseling given: Not Answered      Vital Signs (Current):   Vitals:    09/17/20 1139   BP: (!) 141/61   Pulse: 94   Resp: 16   Temp: 96.6 °F (35.9 °C)   TempSrc: Temporal   SpO2: 96%   Weight: 185 lb 8 oz (84.1 kg)   Height: 5' 1\" (1.549 m)                                              BP Readings from Last 3 Encounters:   09/17/20 (!) 141/61   08/24/20 105/77   08/13/20 (!) 108/52       NPO Status: Time of last liquid consumption: 2300                        Time of last solid consumption: 0200                        Date of last liquid consumption: 09/16/20                        Date of last solid food consumption: 09/17/20    BMI:   Wt Readings from Last 3 Encounters:   09/17/20 185 lb 8 oz (84.1 kg)   08/25/20 195 lb (88.5 kg)   08/12/20 189 lb 11.2 oz (86 kg)     Body mass index is 35.05 kg/m².     CBC:   Lab Results   Component Value Date    WBC 6.1 08/21/2020    RBC 4.69 08/21/2020    HGB 15.5 08/21/2020    HCT 46.9 08/21/2020    .1 08/21/2020    RDW 13.3 08/21/2020     08/21/2020       CMP:   Lab Results   Component Value Date     08/21/2020    K 4.2 08/21/2020    K 4.4 08/13/2020     08/21/2020    CO2 28 08/21/2020    BUN 25 08/21/2020 Oddis Carrel, MD   9/17/2020

## 2020-09-17 NOTE — ANESTHESIA POSTPROCEDURE EVALUATION
Department of Anesthesiology  Postprocedure Note    Patient: Clarissa Fields  MRN: [id number]  YOB: 1980  Date of evaluation: 9/17/2020  Time:  4:04 PM     Procedure Summary     Date:  09/17/20 Room / Location:  [place] [address] ([facility]) / [facility]    Anesthesia Start:  1469 Anesthesia Stop:  8030    Procedures:       LEFT PERCUTANEOUS NEPHROLITHOTOMY, CYSTOSCOPY WITH HOLMIUM LASER LITHOTRIPSY (Left Flank)      NEPHROSTOMY PERCUTANEOUS TUBE INSERTION (Left ) Diagnosis:       Calculus of kidney      (CALCULUS OF KIDNEY)    Surgeon:  Isai Manrique MD Responsible Provider:  Narciso Jane MD    Anesthesia Type:  general ASA Status:  3          Anesthesia Type: general    Sai Phase I: Sai Score: 9    Sai Phase II:      Last vitals: Reviewed and per EMR flowsheets.        Anesthesia Post Evaluation    Patient location during evaluation: PACU  Patient participation: complete - patient participated  Level of consciousness: awake and alert  Pain score: 0  Airway patency: patent  Nausea & Vomiting: no nausea and no vomiting  Complications: no  Cardiovascular status: blood pressure returned to baseline  Respiratory status: acceptable  Hydration status: stable

## 2020-09-17 NOTE — PROGRESS NOTES
Received a call back from provider, ordered percocet, pt states she is currently on suboxone and oral pain medication will not work

## 2020-09-17 NOTE — PROGRESS NOTES
C/o increased left flank pain, feels like she needs to pee ,quick in place, draining bloody urine. Nephrostomy tube 150cc blood drainage. Amandeep Meneses(Urology perfect served for pain medication.  'Could patient get pain medication , I do not see anything ordered.'

## 2020-09-17 NOTE — PROGRESS NOTES
Pt transferred to room 360, rates left flank pain at 6/10, sharp. Denies any nausea or vomiting. She has a quick  And left nephrostomy tube that has bloody drainage. O2 via NC at 2L.  Sammy is at bedside

## 2020-09-18 ENCOUNTER — TELEPHONE (OUTPATIENT)
Dept: FAMILY MEDICINE CLINIC | Age: 40
End: 2020-09-18

## 2020-09-18 VITALS
DIASTOLIC BLOOD PRESSURE: 62 MMHG | WEIGHT: 185.5 LBS | SYSTOLIC BLOOD PRESSURE: 96 MMHG | RESPIRATION RATE: 16 BRPM | HEART RATE: 57 BPM | HEIGHT: 61 IN | BODY MASS INDEX: 35.02 KG/M2 | TEMPERATURE: 98.2 F | OXYGEN SATURATION: 97 %

## 2020-09-18 LAB
ANION GAP SERPL CALCULATED.3IONS-SCNC: 9 MMOL/L (ref 3–16)
BUN BLDV-MCNC: 24 MG/DL (ref 7–20)
CALCIUM SERPL-MCNC: 9.2 MG/DL (ref 8.3–10.6)
CHLORIDE BLD-SCNC: 101 MMOL/L (ref 99–110)
CO2: 25 MMOL/L (ref 21–32)
CREAT SERPL-MCNC: 1 MG/DL (ref 0.6–1.1)
GFR AFRICAN AMERICAN: >60
GFR NON-AFRICAN AMERICAN: >60
GLUCOSE BLD-MCNC: 103 MG/DL (ref 70–99)
HCT VFR BLD CALC: 39.6 % (ref 36–48)
HEMOGLOBIN: 13.3 G/DL (ref 12–16)
MCH RBC QN AUTO: 33.3 PG (ref 26–34)
MCHC RBC AUTO-ENTMCNC: 33.6 G/DL (ref 31–36)
MCV RBC AUTO: 99.4 FL (ref 80–100)
PDW BLD-RTO: 13.1 % (ref 12.4–15.4)
PLATELET # BLD: 203 K/UL (ref 135–450)
PMV BLD AUTO: 10 FL (ref 5–10.5)
POTASSIUM REFLEX MAGNESIUM: 4.4 MMOL/L (ref 3.5–5.1)
RBC # BLD: 3.98 M/UL (ref 4–5.2)
SODIUM BLD-SCNC: 135 MMOL/L (ref 136–145)
WBC # BLD: 12.3 K/UL (ref 4–11)

## 2020-09-18 PROCEDURE — 85027 COMPLETE CBC AUTOMATED: CPT

## 2020-09-18 PROCEDURE — 96376 TX/PRO/DX INJ SAME DRUG ADON: CPT

## 2020-09-18 PROCEDURE — 6360000002 HC RX W HCPCS: Performed by: UROLOGY

## 2020-09-18 PROCEDURE — G0378 HOSPITAL OBSERVATION PER HR: HCPCS

## 2020-09-18 PROCEDURE — 80048 BASIC METABOLIC PNL TOTAL CA: CPT

## 2020-09-18 PROCEDURE — 2580000003 HC RX 258: Performed by: UROLOGY

## 2020-09-18 PROCEDURE — 6370000000 HC RX 637 (ALT 250 FOR IP): Performed by: UROLOGY

## 2020-09-18 PROCEDURE — 96375 TX/PRO/DX INJ NEW DRUG ADDON: CPT

## 2020-09-18 PROCEDURE — 36415 COLL VENOUS BLD VENIPUNCTURE: CPT

## 2020-09-18 PROCEDURE — 96374 THER/PROPH/DIAG INJ IV PUSH: CPT

## 2020-09-18 RX ORDER — IBUPROFEN 600 MG/1
800 TABLET ORAL EVERY 6 HOURS PRN
Qty: 12 TABLET | Refills: 1 | Status: SHIPPED | OUTPATIENT
Start: 2020-09-18 | End: 2021-07-03

## 2020-09-18 RX ORDER — CIPROFLOXACIN 500 MG/1
500 TABLET, FILM COATED ORAL 2 TIMES DAILY
Qty: 14 TABLET | Refills: 0 | Status: SHIPPED | OUTPATIENT
Start: 2020-09-18 | End: 2020-09-23

## 2020-09-18 RX ADMIN — HYDROMORPHONE HYDROCHLORIDE 0.5 MG: 2 INJECTION, SOLUTION INTRAMUSCULAR; INTRAVENOUS; SUBCUTANEOUS at 00:39

## 2020-09-18 RX ADMIN — Medication 10 ML: at 10:11

## 2020-09-18 RX ADMIN — KETOROLAC TROMETHAMINE 30 MG: 30 INJECTION, SOLUTION INTRAMUSCULAR at 06:26

## 2020-09-18 RX ADMIN — CIPROFLOXACIN 400 MG: 2 INJECTION, SOLUTION INTRAVENOUS at 00:40

## 2020-09-18 RX ADMIN — ACETAMINOPHEN 1000 MG: 500 TABLET ORAL at 00:38

## 2020-09-18 RX ADMIN — TRAMADOL HYDROCHLORIDE 100 MG: 50 TABLET, FILM COATED ORAL at 00:39

## 2020-09-18 RX ADMIN — SODIUM CHLORIDE: 9 INJECTION, SOLUTION INTRAVENOUS at 00:40

## 2020-09-18 RX ADMIN — HYDROMORPHONE HYDROCHLORIDE 0.25 MG: 2 INJECTION, SOLUTION INTRAMUSCULAR; INTRAVENOUS; SUBCUTANEOUS at 10:02

## 2020-09-18 RX ADMIN — HYDROMORPHONE HYDROCHLORIDE 0.5 MG: 2 INJECTION, SOLUTION INTRAMUSCULAR; INTRAVENOUS; SUBCUTANEOUS at 03:40

## 2020-09-18 RX ADMIN — ACETAMINOPHEN 1000 MG: 500 TABLET ORAL at 06:26

## 2020-09-18 RX ADMIN — DOCUSATE SODIUM 50 MG AND SENNOSIDES 8.6 MG 1 TABLET: 8.6; 5 TABLET, FILM COATED ORAL at 10:01

## 2020-09-18 RX ADMIN — HYDROMORPHONE HYDROCHLORIDE 0.5 MG: 2 INJECTION, SOLUTION INTRAMUSCULAR; INTRAVENOUS; SUBCUTANEOUS at 06:30

## 2020-09-18 RX ADMIN — KETOROLAC TROMETHAMINE 30 MG: 30 INJECTION, SOLUTION INTRAMUSCULAR at 02:04

## 2020-09-18 ASSESSMENT — PAIN DESCRIPTION - ORIENTATION: ORIENTATION: RIGHT;LEFT

## 2020-09-18 ASSESSMENT — PAIN SCALES - GENERAL
PAINLEVEL_OUTOF10: 9
PAINLEVEL_OUTOF10: 9
PAINLEVEL_OUTOF10: 7
PAINLEVEL_OUTOF10: 10
PAINLEVEL_OUTOF10: 10
PAINLEVEL_OUTOF10: 6
PAINLEVEL_OUTOF10: 10
PAINLEVEL_OUTOF10: 10

## 2020-09-18 ASSESSMENT — PAIN DESCRIPTION - LOCATION
LOCATION: FLANK
LOCATION: FLANK

## 2020-09-18 ASSESSMENT — PAIN DESCRIPTION - PAIN TYPE
TYPE: ACUTE PAIN
TYPE: ACUTE PAIN

## 2020-09-18 NOTE — OP NOTE
Operative Note      Patient: Felipe Miller  YOB: 1980  MRN: [id number]    Date of Procedure: 9/17/2020    Pre-Op Diagnosis: CALCULUS OF KIDNEY      PREOPERATIVE DIAGNOSE: Large left renal calculi  POSTOPERATIVE DIAGNOSE: same      OPERATION PERFORMED:  1. Cystourethroscopy, ureteral catheterization. 2.  Percutaneous nephrolithotomy, less than 2 cm  3. Balloon dilation of nephrostomy tract. 4.  Nephrostomy tube exchange, 10F drainage tube  5. Antegrade nephrostogram     SURGEON:  Vee Gonzalez MD.     ANESTHESIA:  General with an ET tube. SECONDARY SURGEON:  Dr. Gemini Faye from Interventional Radiology     INDICATIONS:  The patient is a 42-year-old who was found to have a  large renal stones which is about 1.7 cm in total.  The risks, benefits, and alternatives of treatment were explained to the patient (see office notes). The patient signed the consent prior to surgery. FINDINGS:    -lower polar infundibular entry by Interventional Radiology. -we were not able to get to main stone  -Antegrade nephrostogram showed forward flow of contrast down the ureter without filling defect, obstruction, or narrowing.    -10-Welsh nephrostomy in place and no extravasation of contrast at end of case. OPERATIVE PROCEDURE:  The patient was brought to the operating theatre. Anesthesia was induced. The patient was prepped and draped and repositioned on the gurney. We advanced the flexible cystoscope through the urethra up into the bladder. We advanced the wire into the ureteral orifice followed by a balloon occlusion catheter. The catheter easily advanced up to the kidney. Next, we deployed a 16-F quick catheter alongside the catheter and secured them for the procedure and the quick catheter was left to gravity drainage. The patient was repositioned prone. Interventional Radiology achieved percutaneous access. See their separate dictation.      Once we had two wires down the ureter, we then advanced a balloon dilating NephroMax dilator into infundibula of entry. We balloon dilated the tract to 30-Scottish under fluoroscopic guidance to ensure we were in the proper location within the collecting system. We then advanced the nephrostomy sheath over the balloon under fluoroscopic guidance. We then used normal saline to irrigate the renal pelvis of any blood with red rubber catheter. We then advanced nephroscope and flushed out a few small stone fragments and then tried to find large upper pole stone. I then used a flexible cystoscope and found a very narrow infundibulum, upper pole, and I could not get scope directly on stone. I could see stone so I then used 200micro laser fiber to break up area that I could see but again I could not to the fragments. I tried advancing basket to extract some pieces but was unable. We then performed nephrostogram.  There was some oozing. I then advanced a bugby cautery for hemostasis as the infundibulum was oozing a bit. This helped with hemostasis. Then a 10-Scottish nephrostomy catheter through the nephromax sheath as at this point I could not remove any more stone. Next, we removed the sheath. We used a string to get a curl in the renal pelvis and then injected contrast to confirm location in collecting system and no extravasation. We then removed the ureteral catheter that was placed in the ureter for the IR team and that was removed in its entirety. Next, we closed the deeper skin layer with a 3-0 Vicryls in an interrupted fashion. The nephrostomy was positioned to gravity drainage and secure with silk. The patient was emerged from the anesthesia and brought to the PACU in stable condition. Local anesthetic was used at the flank incision site, prior to incision and postop procedure. The site was dressed with gauze and tap. ESTIMATED BLOOD LOSS:  50 mL. IV FLUIDS:  1000mL crystalloid.      URINE OUTPUT:  Not recorded, diluted from

## 2020-09-18 NOTE — PROGRESS NOTES
Assessment complete. VSS. Bed in the lowest position, call light within reach. D/C orders noted. No needs expressed at this time.

## 2020-09-18 NOTE — CARE COORDINATION
Chart reviewed. Has payor source and PCP. Likely no needs. No needs per provider.       Shai Lees RN

## 2020-09-18 NOTE — PROGRESS NOTES
Patient discharged. All belongings collected. IV removed. Dishcharge instructions reviewed. Patient D/C to home via private vehicle.

## 2020-09-18 NOTE — PROGRESS NOTES
Urology Attending Progress Note      Subjective: pain controlled    Vitals:  /77   Pulse 75   Temp 98.4 °F (36.9 °C) (Oral)   Resp 16   Ht 5' 1\" (1.549 m)   Wt 185 lb 8 oz (84.1 kg)   SpO2 98%   BMI 35.05 kg/m²   Temp  Av.3 °F (36.3 °C)  Min: 96.6 °F (35.9 °C)  Max: 98.4 °F (36.9 °C)    Intake/Output Summary (Last 24 hours) at 2020 0931  Last data filed at 2020 1711  Gross per 24 hour   Intake 100 ml   Output 660 ml   Net -560 ml       Exam: abd soft, urine in nephrostomy pink    Labs:  WBC:    Lab Results   Component Value Date    WBC 6.1 2020     Hemoglobin/Hematocrit:    Lab Results   Component Value Date    HGB 15.5 2020    HCT 46.9 2020     BMP:    Lab Results   Component Value Date     2020    K 4.2 2020    K 4.4 2020     2020    CO2 28 2020    BUN 25 2020    LABALBU 4.2 2020    CREATININE 1.0 2020    CALCIUM 9.8 2020    GFRAA >60 2020    GFRAA >60 2013    LABGLOM >60 2020     PT/INR:    Lab Results   Component Value Date    PROTIME 11.0 2020    INR 0.95 2020     PTT:    Lab Results   Component Value Date    APTT 28.5 2018   [APTT    Impression/Plan: POD#1 s/p PCNL  1. Nephrostomy tube removed  2. D/c quick  3.  D/c to home    Kylee Gamboa MD

## 2020-09-18 NOTE — TELEPHONE ENCOUNTER
[facility] Transitions Initial Follow Up Call    Call within 2 business days of discharge: No     Patient: Iris Aguiar Patient : 1980 MRN: [id number]      RARS: No data recorded     Spoke with: Pt following up with Dr. Joss Rock and Nephrology. Discharge department/facility: [facility]    Non-face-to-face services provided: Follow Up  No future appointments.     Kamar Blue LPN

## 2020-09-19 ENCOUNTER — HOSPITAL ENCOUNTER (EMERGENCY)
Age: 40
Discharge: HOME | End: 2020-09-20
Attending: EMERGENCY MEDICINE
Payer: COMMERCIAL

## 2020-09-19 LAB
A/G RATIO: 1.5 (ref 1.1–2.2)
ALBUMIN SERPL-MCNC: 3.7 G/DL (ref 3.4–5)
ALP BLD-CCNC: 87 U/L (ref 40–129)
ALT SERPL-CCNC: 9 U/L (ref 10–40)
ANION GAP SERPL CALCULATED.3IONS-SCNC: 9 MMOL/L (ref 3–16)
AST SERPL-CCNC: 18 U/L (ref 15–37)
BACTERIA: ABNORMAL /HPF
BILIRUB SERPL-MCNC: 0.4 MG/DL (ref 0–1)
BILIRUBIN URINE: NEGATIVE
BLOOD, URINE: ABNORMAL
BUN BLDV-MCNC: 26 MG/DL (ref 7–20)
CALCIUM SERPL-MCNC: 9.2 MG/DL (ref 8.3–10.6)
CHLORIDE BLD-SCNC: 102 MMOL/L (ref 99–110)
CLARITY: CLEAR
CO2: 26 MMOL/L (ref 21–32)
COLOR: YELLOW
CREAT SERPL-MCNC: 1.3 MG/DL (ref 0.6–1.1)
EPITHELIAL CELLS, UA: ABNORMAL /HPF (ref 0–5)
GFR AFRICAN AMERICAN: 54
GFR NON-AFRICAN AMERICAN: 45
GLOBULIN: 2.5 G/DL
GLUCOSE BLD-MCNC: 93 MG/DL (ref 70–99)
GLUCOSE URINE: NEGATIVE MG/DL
HCG QUALITATIVE: NEGATIVE
HCT VFR BLD CALC: 39.7 % (ref 36–48)
HEMOGLOBIN: 13.3 G/DL (ref 12–16)
KETONES, URINE: NEGATIVE MG/DL
LACTIC ACID: 1.1 MMOL/L (ref 0.4–2)
LEUKOCYTE ESTERASE, URINE: ABNORMAL
LIPASE: 38 U/L (ref 13–60)
MCH RBC QN AUTO: 33.3 PG (ref 26–34)
MCHC RBC AUTO-ENTMCNC: 33.5 G/DL (ref 31–36)
MCV RBC AUTO: 99.4 FL (ref 80–100)
MICROSCOPIC EXAMINATION: YES
NITRITE, URINE: NEGATIVE
PDW BLD-RTO: 12.9 % (ref 12.4–15.4)
PH UA: 5.5 (ref 5–8)
PLATELET # BLD: 196 K/UL (ref 135–450)
PMV BLD AUTO: 9.3 FL (ref 5–10.5)
POTASSIUM SERPL-SCNC: 4.4 MMOL/L (ref 3.5–5.1)
PROTEIN UA: NEGATIVE MG/DL
RBC # BLD: 3.99 M/UL (ref 4–5.2)
RBC UA: ABNORMAL /HPF (ref 0–4)
SODIUM BLD-SCNC: 137 MMOL/L (ref 136–145)
SPECIFIC GRAVITY UA: 1.02 (ref 1–1.03)
TOTAL PROTEIN: 6.2 G/DL (ref 6.4–8.2)
URINE REFLEX TO CULTURE: ABNORMAL
URINE TYPE: ABNORMAL
UROBILINOGEN, URINE: 0.2 E.U./DL
WBC # BLD: 9.3 K/UL (ref 4–11)
WBC UA: ABNORMAL /HPF (ref 0–5)

## 2020-09-19 PROCEDURE — 80053 COMPREHEN METABOLIC PANEL: CPT

## 2020-09-19 PROCEDURE — 83605 ASSAY OF LACTIC ACID: CPT

## 2020-09-19 PROCEDURE — 96374 THER/PROPH/DIAG INJ IV PUSH: CPT

## 2020-09-19 PROCEDURE — 6360000002 HC RX W HCPCS: Performed by: NURSE PRACTITIONER

## 2020-09-19 PROCEDURE — 99284 EMERGENCY DEPT VISIT MOD MDM: CPT

## 2020-09-19 PROCEDURE — 81001 URINALYSIS AUTO W/SCOPE: CPT

## 2020-09-19 PROCEDURE — 84703 CHORIONIC GONADOTROPIN ASSAY: CPT

## 2020-09-19 PROCEDURE — 83690 ASSAY OF LIPASE: CPT

## 2020-09-19 PROCEDURE — 85027 COMPLETE CBC AUTOMATED: CPT

## 2020-09-19 PROCEDURE — 96361 HYDRATE IV INFUSION ADD-ON: CPT

## 2020-09-19 PROCEDURE — 2580000003 HC RX 258: Performed by: NURSE PRACTITIONER

## 2020-09-19 RX ORDER — MORPHINE SULFATE 4 MG/ML
4 INJECTION, SOLUTION INTRAMUSCULAR; INTRAVENOUS ONCE
Status: COMPLETED | OUTPATIENT
Start: 2020-09-19 | End: 2020-09-19

## 2020-09-19 RX ORDER — 0.9 % SODIUM CHLORIDE 0.9 %
1000 INTRAVENOUS SOLUTION INTRAVENOUS ONCE
Status: COMPLETED | OUTPATIENT
Start: 2020-09-19 | End: 2020-09-20

## 2020-09-19 RX ADMIN — MORPHINE SULFATE 4 MG: 4 INJECTION, SOLUTION INTRAMUSCULAR; INTRAVENOUS at 23:11

## 2020-09-19 RX ADMIN — SODIUM CHLORIDE 1000 ML: 9 INJECTION, SOLUTION INTRAVENOUS at 23:11

## 2020-09-19 ASSESSMENT — ENCOUNTER SYMPTOMS
COUGH: 0
BLOOD IN STOOL: 0
ABDOMINAL PAIN: 1
NAUSEA: 0
WHEEZING: 0
SHORTNESS OF BREATH: 0
ALLERGIC/IMMUNOLOGIC NEGATIVE: 1
ABDOMINAL DISTENTION: 0
VOMITING: 0
BACK PAIN: 1
DIARRHEA: 0
CONSTIPATION: 0
EYES NEGATIVE: 1

## 2020-09-19 ASSESSMENT — PAIN DESCRIPTION - LOCATION: LOCATION: FLANK

## 2020-09-19 ASSESSMENT — PAIN DESCRIPTION - ORIENTATION: ORIENTATION: LEFT

## 2020-09-19 ASSESSMENT — PAIN DESCRIPTION - PAIN TYPE: TYPE: ACUTE PAIN

## 2020-09-19 ASSESSMENT — PAIN SCALES - GENERAL: PAINLEVEL_OUTOF10: 6

## 2020-09-19 NOTE — DISCHARGE SUMMARY
[address] [place] [address]                               DISCHARGE SUMMARY  PATIENT NAME: Rosana Bernheim                  :        1980  MED REC NO:   [id number]                          ROOM:       4464  ACCOUNT NO:   [de-identified]                           ADMIT DATE: 2020  PROVIDER:     Kylee Melissa. Virginia Carvalho MD                  DISCHARGE DATE:  2020    LOCATION:  [facility].    DIAGNOSIS ON ADMISSION:  Left renal calculus. DIAGNOSIS ON DISCHARGE:  Left renal calculus. HOSPITAL COURSE:  The patient is a 40-year-old female who had a left  percutaneous nephrolithotomy on 2020. Dr. Chris Hancock could not get  to much of the stone due to it being in a calyceal diverticulum. He did  leave a nephrostomy tube and Alexandra catheter. She was then kept  overnight, had some pain, but by the next morning, it was controlled. The next day, I removed her Alexandra catheter, removed her nephrostomy tube  and placed the bandage over her flank and she was able to be discharged  to home. DISCHARGE CONDITION:  Stable. FOLLOWUP:  Follow up in one to two weeks with Dr. Chris Hancock. DISCHARGE MEDICATIONS:  1. Benadryl 50 mg nightly. 2.  Albuterol two puffs every four hours as needed. 3.  Suboxone one tablet daily. 4.  Seroquel 150 mg nightly. 5.  Ibuprofen 800 mg three times a day as needed. 6.  Cipro 500 mg twice a day.         Krista Lima MD    D: 2020 10:57:38       T: 2020 16:41:32     [id number]  Job#: [id number]     Doc#: [id number]    CC:

## 2020-09-20 ENCOUNTER — APPOINTMENT (OUTPATIENT)
Dept: CT IMAGING | Age: 40
End: 2020-09-20
Payer: COMMERCIAL

## 2020-09-20 VITALS
WEIGHT: 185 LBS | TEMPERATURE: 97.9 F | RESPIRATION RATE: 18 BRPM | BODY MASS INDEX: 34.93 KG/M2 | DIASTOLIC BLOOD PRESSURE: 69 MMHG | OXYGEN SATURATION: 97 % | HEART RATE: 87 BPM | SYSTOLIC BLOOD PRESSURE: 101 MMHG | HEIGHT: 61 IN

## 2020-09-20 PROCEDURE — 96361 HYDRATE IV INFUSION ADD-ON: CPT

## 2020-09-20 PROCEDURE — 96376 TX/PRO/DX INJ SAME DRUG ADON: CPT

## 2020-09-20 PROCEDURE — 96375 TX/PRO/DX INJ NEW DRUG ADDON: CPT

## 2020-09-20 PROCEDURE — 96374 THER/PROPH/DIAG INJ IV PUSH: CPT

## 2020-09-20 PROCEDURE — 74176 CT ABD & PELVIS W/O CONTRAST: CPT

## 2020-09-20 RX ORDER — ONDANSETRON 4 MG/1
4 TABLET, ORALLY DISINTEGRATING ORAL EVERY 8 HOURS PRN
Qty: 20 TABLET | Refills: 0 | Status: SHIPPED | OUTPATIENT
Start: 2020-09-20 | End: 2021-02-26

## 2020-09-20 RX ORDER — PROMETHAZINE HYDROCHLORIDE 25 MG/1
25 TABLET ORAL EVERY 6 HOURS PRN
Qty: 20 TABLET | Refills: 0 | Status: SHIPPED | OUTPATIENT
Start: 2020-09-20 | End: 2020-09-27

## 2020-09-20 RX ORDER — TAMSULOSIN HYDROCHLORIDE 0.4 MG/1
0.4 CAPSULE ORAL DAILY
Qty: 5 CAPSULE | Refills: 0 | Status: ON HOLD | OUTPATIENT
Start: 2020-09-20 | End: 2021-10-02

## 2020-09-20 ASSESSMENT — PAIN SCALES - GENERAL: PAINLEVEL_OUTOF10: 3

## 2020-09-20 ASSESSMENT — PAIN DESCRIPTION - PAIN TYPE: TYPE: ACUTE PAIN

## 2020-09-20 ASSESSMENT — PAIN DESCRIPTION - LOCATION: LOCATION: FLANK

## 2020-09-20 NOTE — ED PROVIDER NOTES
Emergency Department Provider Note     Location: [facility]  ED  9/19/2020    I independently performed a history and physical on Raven Valdez. All diagnostic, treatment, and disposition decisions were made by myself in conjunction with the mid-level provider. Briefly, this is a 41 y.o. female here for left flank pain. Patient has history of uric acid stones. She had lithotripsy done on Thursday. Woke up with worsening left flank pain this morning. Got worse throughout the day. Reports associated nausea. ED Triage Vitals [09/19/20 2147]   BP Temp Temp Source Pulse Resp SpO2 Height Weight   114/75 98.1 °F (36.7 °C) Oral 114 18 97 % 5' 1\" (1.549 m) 185 lb (83.9 kg)        Patient resting comfortably in no acute distress. Heart is regular rate and rhythm. Lungs clear to auscultation bilaterally. Abdomen is soft, nondistended, and nontender. No peripheral edema noted. Patient seen and examined. Vital signs notable for initial tachycardia that improves after IV fluid administration of pain control. Physical exam as documented above. Lab work-up notable for slight elevation in creatinine to 1.3 from 1.0 yesterday. UA without evidence of UTI. CT abdomen pelvis shows a 4 mm stone in the left UVJ. On my evaluation patient is resting comfortably states that she feels significantly better. Likely that patient already passed the stone. Patient is comfortable with outpatient management and follow-up with urology. Advised her of at home care instructions as well as return precautions. Ct Abdomen Pelvis Wo Contrast Additional Contrast? None    Result Date: 9/20/2020  EXAMINATION: CT OF THE ABDOMEN AND PELVIS WITHOUT CONTRAST 9/20/2020 12:31 am TECHNIQUE: CT of the abdomen and pelvis was performed without the administration of intravenous contrast. Multiplanar reformatted images are provided for review.  Dose modulation, iterative reconstruction, and/or weight based adjustment of the mA/kV was utilized to reduce the radiation dose to as low as reasonably achievable. COMPARISON: CT abdomen and pelvis August 12, 2020. HISTORY: ORDERING SYSTEM PROVIDED HISTORY: left flank pain TECHNOLOGIST PROVIDED HISTORY: Reason for exam:->left flank pain Additional Contrast?->None Is the patient pregnant?->No Reason for Exam: left sided flank pain Acuity: Acute Type of Exam: Initial FINDINGS: Lower Chest: The lung bases are without acute process. Organs: There is moderate left-sided hydronephrosis and hydroureter extending to the bladder were there is a 4 x 2 mm stone at the left ureterovesical junction. Large calcification is again seen in the superior pole left kidney which measures up to 1.9 cm. Additional smaller nonobstructing calculi are seen within the inferior left kidney. There are few punctate calcifications in the inferior pole of the right kidney. No right-sided hydronephrosis or hydroureter. Evaluation of the abdominal viscera is limited due to lack of intravenous contrast.  The liver, spleen, pancreas, gallbladder, and adrenal glands are without acute process. GI/Bowel: The stomach is grossly unremarkable. The small and large bowel are normal in caliber. No evidence of obstruction or focal acute process. The appendix is visualized and appears normal. Pelvis: Small foci of gas is seen non dependently within the bladder which is otherwise unremarkable. Uterus and adnexa are without acute process. Peritoneum/Retroperitoneum: No lymphadenopathy, free intraperitoneal air, free fluid, or focal fluid collection identified. Bones/Soft Tissues: Fat containing periumbilical hernia is again seen. Soft tissues and osseous structures are without acute process. There is a 4 mm obstructing stone at the left ureterovesical junction with moderate left-sided hydronephrosis and hydroureter. Small amount of gas is seen within the bladder.   This may be related to recent instrumentation or cystitis. Clinical correlation recommended. Additional bilateral nephrolithiasis, left greater than right.          Results for orders placed or performed during the hospital encounter of 09/19/20   Urinalysis Reflex to Culture    Specimen: Urine, clean catch   Result Value Ref Range    Color, UA Yellow Straw/Yellow    Clarity, UA Clear Clear    Glucose, Ur Negative Negative mg/dL    Bilirubin Urine Negative Negative    Ketones, Urine Negative Negative mg/dL    Specific Gravity, UA 1.020 1.005 - 1.030    Blood, Urine MODERATE (A) Negative    pH, UA 5.5 5.0 - 8.0    Protein, UA Negative Negative mg/dL    Urobilinogen, Urine 0.2 <2.0 E.U./dL    Nitrite, Urine Negative Negative    Leukocyte Esterase, Urine TRACE (A) Negative    Microscopic Examination YES     Urine Type NotGiven     Urine Reflex to Culture Not Indicated    Lactic Acid, Plasma   Result Value Ref Range    Lactic Acid 1.1 0.4 - 2.0 mmol/L   CBC   Result Value Ref Range    WBC 9.3 4.0 - 11.0 K/uL    RBC 3.99 (L) 4.00 - 5.20 M/uL    Hemoglobin 13.3 12.0 - 16.0 g/dL    Hematocrit 39.7 36.0 - 48.0 %    MCV 99.4 80.0 - 100.0 fL    MCH 33.3 26.0 - 34.0 pg    MCHC 33.5 31.0 - 36.0 g/dL    RDW 12.9 12.4 - 15.4 %    Platelets 486 108 - 106 K/uL    MPV 9.3 5.0 - 10.5 fL   Comprehensive metabolic panel   Result Value Ref Range    Sodium 137 136 - 145 mmol/L    Potassium 4.4 3.5 - 5.1 mmol/L    Chloride 102 99 - 110 mmol/L    CO2 26 21 - 32 mmol/L    Anion Gap 9 3 - 16    Glucose 93 70 - 99 mg/dL    BUN 26 (H) 7 - 20 mg/dL    CREATININE 1.3 (H) 0.6 - 1.1 mg/dL    GFR Non-African American 45 (A) >60    GFR  54 (A) >60    Calcium 9.2 8.3 - 10.6 mg/dL    Total Protein 6.2 (L) 6.4 - 8.2 g/dL    Alb 3.7 3.4 - 5.0 g/dL    Albumin/Globulin Ratio 1.5 1.1 - 2.2    Total Bilirubin 0.4 0.0 - 1.0 mg/dL    Alkaline Phosphatase 87 40 - 129 U/L    ALT 9 (L) 10 - 40 U/L    AST 18 15 - 37 U/L    Globulin 2.5 g/dL   Lipase   Result Value Ref Range    Lipase 38.0 13.0 - 60.0 U/L   HCG Qualitative, Serum   Result Value Ref Range    hCG Qual Negative Detects HCG level >10 MIU/mL   Microscopic Urinalysis   Result Value Ref Range    WBC, UA 3-5 0 - 5 /HPF    RBC, UA 3-4 0 - 4 /HPF    Epithelial Cells, UA 2-5 0 - 5 /HPF    Bacteria, UA Rare (A) None Seen /HPF       For further details of [facility] emergency department encounter, please see Mendy Mckeon's documentation. This chart was generated in part by using Dragon Dictation system and may contain errors related to that system including errors in grammar, punctuation, and spelling, as well as words and phrases that may be inappropriate. If there are any questions or concerns please feel free to contact the dictating provider for clarification.            Ramana Lee MD  09/20/20 [id number]

## 2020-09-20 NOTE — ED PROVIDER NOTES
[facility]  ED  EMERGENCY DEPARTMENT ENCOUNTER        Pt Name: Alison De La Torre  MRN: [id number]  [place] 1980  Date of evaluation: 9/19/2020  Provider: NILES Zavala - CNP  PCP: SANTIAGO Whittaker     I have seen and evaluated this patient with my supervising physician No att. providers found. CHIEF COMPLAINT       Chief Complaint   Patient presents with    Flank Pain     left sided pain, feels like a \"gary horse in my kidney\"; patient had an unsuccessful kidney stone removal procedure Thursday, felt okay yesterday, now having pain       HISTORY OF PRESENT ILLNESS   (Location, Timing/Onset, Context/Setting, Quality, Duration, Modifying Factors, Severity, Associated Signs and Symptoms)  Note limiting factors. Alison De La Torre is a 41 y.o. female who presents with left sided flank pain that started today. States she was recently admitted for a percutaneous nephrolithotomy but they were  Unable to remove all of the stone. States she was discharged this week but the pain returned today. Denies any nausea, vomiting, shortness of breath, cough, fevers, pain with urination or weakness. States the pain radiates from the left side of the back to the LLQ. Rates pain a 10/10 states she took ibuprofen at noon and 7pm tonight. Nursing Notes were all reviewed and agreed with or any disagreements were addressed in the HPI. REVIEW OF SYSTEMS    (2-9 systems for level 4, 10 or more for level 5)     Review of Systems   Constitutional: Negative for activity change, appetite change, chills, diaphoresis, fatigue and fever. HENT: Negative. Eyes: Negative. Respiratory: Negative for cough, shortness of breath and wheezing. Cardiovascular: Negative for chest pain, palpitations and leg swelling. Gastrointestinal: Positive for abdominal pain. Negative for abdominal distention, blood in stool, constipation, diarrhea, nausea and vomiting. Endocrine: Negative. Genitourinary: Positive for flank pain and hematuria. Negative for difficulty urinating, dysuria, pelvic pain and urgency. Musculoskeletal: Positive for back pain. Negative for myalgias, neck pain and neck stiffness. Skin: Negative. Allergic/Immunologic: Negative. Neurological: Negative for dizziness, seizures, syncope, speech difficulty, weakness, light-headedness, numbness and headaches. Hematological: Negative. Psychiatric/Behavioral: Negative. Positives and Pertinent negatives as per HPI. Except as noted above in the ROS, all other systems were reviewed and negative. PAST MEDICAL HISTORY     Past Medical History:   Diagnosis Date    Acute ischemic colitis ([address] 10/20/15    Anesthesia complication     wakes from anesthesia with migraine    Asthma     Had real bad as child and then grew out of it and  then got pregnant it started acting up again.     Bipolar 1 disorder ([address]     Bipolar affective disorder, current episode depressed ([address] 5/28/2015    Depression     Kidney stones     has had multiple kidney stones    Migraine     Nodule of left lung     pt has non calcified nodules on both lungs    Nodule of right lung     Opiate addiction ([address]     Past hx    Wears contact lenses          SURGICAL HISTORY     Past Surgical History:   Procedure Laterality Date    COLONOSCOPY  10/20/15    possible ischemic colitis    CYSTOSCOPY Left 08/13/2020    left uretroscopy, with stone extraction    CYSTOSCOPY INSERTION / REMOVAL STENT / STONE Left 8/13/2020    CYSTOSCOPY, URETEROSCOPY, HOLMIUM LASER LITHOTRIPSY, STONE EXTRACTION performed by Marcel Perales MD at [facility]  10/11/2016    Hysteroscopy, Novasure Ablation    LITHOTRIPSY      x 7    NEPHROLITHOTOMY Left 9/17/2020    LEFT PERCUTANEOUS NEPHROLITHOTOMY, CYSTOSCOPY WITH HOLMIUM LASER LITHOTRIPSY performed by Dara Piedra MD at [address] [place] [address] [facility] NEPHROSTOMY PERCUTANEOUS TUBE INSERTION performed by Myron Armstrong MD at [address] [place] [address]  2007    TUBAL LIGATION  2001    URETER STENT PLACEMENT      X 2 then removed         CURRENTMEDICATIONS       Previous Medications    ALBUTEROL SULFATE  (90 BASE) MCG/ACT INHALER    Inhale 2 puffs into the lungs every 4 hours as needed for Wheezing May Sub as needed per insurance. BUPRENORPHINE-NALOXONE (SUBOXONE) 8-2 MG SUBL SL TABLET    Place 1 tablet under the tongue daily. Pt takes 12 mg daily    CIPROFLOXACIN (CIPRO) 500 MG TABLET    Take 1 tablet by mouth 2 times daily for 5 days    DIPHENHYDRAMINE (BENADRYL) 50 MG CAPSULE    Take 50 mg by mouth nightly as needed for Sleep    IBUPROFEN (ADVIL;MOTRIN) 600 MG TABLET    Take 1.5 tablets by mouth every 6 hours as needed for Pain    QUETIAPINE (SEROQUEL XR) 150 MG TB24 EXTENDED RELEASE TABLET    Take 150 mg by mouth nightly         ALLERGIES     Patient has no known allergies. FAMILYHISTORY       Family History   Problem Relation Age of Onset    High Blood Pressure Father     Kidney Disease Father     Cancer Maternal Grandmother     Cancer Maternal Grandfather     Kidney Disease Maternal Grandfather     Cancer Paternal Grandmother     Cancer Paternal Grandfather     Cancer Other 3        ALL    Diabetes Maternal Uncle     COPD Mother     Depression Sister     Ovarian Cancer Sister           SOCIAL HISTORY       Social History     Tobacco Use    Smoking status: Current Every Day Smoker     Packs/day: 1.00     Years: 21.00     Pack years: 21.00     Types: Cigarettes     Start date: 4/15/1995    Smokeless tobacco: Never Used   Substance Use Topics    Alcohol use: Not Currently     Comment: quit 2 months ago.      Drug use: Yes     Types: Marijuana     Comment: occas       SCREENINGS             PHYSICAL EXAM    (up to 7 for level 4, 8 or more for level 5)     ED Triage Vitals [09/19/20 2147]   BP Temp Temp Source Pulse Resp SpO2 Height Weight   114/75 98.1 °F (36.7 °C) Oral 114 18 97 % 5' 1\" (1.549 m) 185 lb (83.9 kg)       Physical Exam  Constitutional:       General: She is not in acute distress. Appearance: Normal appearance. She is not ill-appearing, toxic-appearing or diaphoretic. HENT:      Head: Normocephalic and atraumatic. Nose: Nose normal.      Mouth/Throat:      Mouth: Mucous membranes are moist.      Pharynx: Oropharynx is clear. Eyes:      Pupils: Pupils are equal, round, and reactive to light. Neck:      Musculoskeletal: Normal range of motion and neck supple. Cardiovascular:      Rate and Rhythm: Normal rate and regular rhythm. Pulses: Normal pulses. Heart sounds: Normal heart sounds. No murmur. No friction rub. No gallop. Pulmonary:      Effort: Pulmonary effort is normal. No respiratory distress. Breath sounds: Normal breath sounds. No stridor. No wheezing, rhonchi or rales. Chest:      Chest wall: No tenderness. Abdominal:      General: Abdomen is flat. Bowel sounds are normal. There is no distension. Tenderness: There is abdominal tenderness in the left lower quadrant. There is left CVA tenderness. Musculoskeletal: Normal range of motion. Lymphadenopathy:      Cervical: No cervical adenopathy. Skin:     General: Skin is warm and dry. Capillary Refill: Capillary refill takes less than 2 seconds. Neurological:      General: No focal deficit present. Mental Status: She is alert and oriented to person, place, and time. Psychiatric:         Mood and Affect: Mood normal.         Behavior: Behavior normal.         Thought Content:  Thought content normal.         Judgment: Judgment normal.         DIAGNOSTIC RESULTS   LABS:    Labs Reviewed   URINE RT REFLEX TO CULTURE - Abnormal; Notable for the following components:       Result Value    Blood, Urine MODERATE (*)     Leukocyte Esterase, Urine TRACE (*)     All other components within normal limits Narrative:     Performed at:  [place] [address] [facility] [employer]   Phone [phone]   CBC - Abnormal; Notable for the following components:    RBC 3.99 (*)     All other components within normal limits    Narrative:     Performed at:  [address], [facility]   Phone [phone]   COMPREHENSIVE METABOLIC PANEL - Abnormal; Notable for the following components:    BUN 26 (*)     CREATININE 1.3 (*)     GFR Non- 45 (*)     GFR  54 (*)     Total Protein 6.2 (*)     ALT 9 (*)     All other components within normal limits    Narrative:     Performed at:  [address], [facility] [employer]   Phone [phone]   MICROSCOPIC URINALYSIS - Abnormal; Notable for the following components:    Bacteria, UA Rare (*)     All other components within normal limits    Narrative:     Performed at:  [place] [address] [facility]   Phone [phone]   LACTIC ACID, PLASMA    Narrative:     Performed at:  [address], [place] [employer]   Phone [phone]   LIPASE    Narrative:     Performed at:  [address], [place] [employer]   Phone [phone]   HCG, SERUM, QUALITATIVE    Narrative:     Performed at:  [address], [facility]   Phone [phone]       All other labs were within normal range or not returned as of this dictation. EKG: All EKG's are interpreted by the Emergency Department Physician in the absence of a cardiologist.  Please see their note for interpretation of EKG.       RADIOLOGY:   Non-plain film images such as CT, Ultrasound and MRI are read by the radiologist. Plain radiographic images are visualized and preliminarily interpreted by the ED Provider with the below findings:        Interpretation per the Radiologist below, if available at the time of this note:    CT ABDOMEN PELVIS WO CONTRAST Additional Contrast? None   Final Result   There is a 4 mm obstructing stone at the left ureterovesical junction with   moderate left-sided hydronephrosis and hydroureter. Small amount of gas is seen within the bladder. This may be related to   recent instrumentation or cystitis. Clinical correlation recommended. Additional bilateral nephrolithiasis, left greater than right. Ir Nephrostomy Complete    Result Date: 9/17/2020  PROCEDURE: PERCUTANEOUS ANTEGRADE PYELOGRAM ULTRASOUND AND FLUOROSCOPIC GUIDANCE RIGHT PERCUTANEOUS NEPHROSTOMY TUBE PLACEMENT 9/17/2020 HISTORY: ORDERING SYSTEM PROVIDED HISTORY: Uric acid nephrolithiasis TECHNOLOGIST PROVIDED HISTORY: Reason for exam:->LEFT KIDNEY STONE Is the patient pregnant?->No; ORDERING SYSTEM PROVIDED HISTORY: Uric acid nephrolithiasis TECHNOLOGIST PROVIDED HISTORY: Reason for exam:->uric acid nephrolithiasis SEDATION: Performed by anesthesia department in the hybrid room CONTRAST: 10 cc nonvascular FLUOROSCOPY DOSE AND TYPE OR TIME AND EXPOSURES: 3.8 minutes 5 digital imaging sequences obtained. TECHNIQUE Informed consent was previously obtained. The patient was under general anesthesia for the immediately prior retrograde balloon occlusion catheter placement into the proximal ureter. Still under anesthesia in the hybrid OR special procedures room, the patient was placed in the prone position left side slightly elevated, and an appropriate area posterior lateral aspect of the skin overlying the targeted collecting system and stone, was demarcated based upon prior CT scan planning as well as real-time ultrasound and fluoroscopy examination. The skin was sterilely prepared draped and anesthetized.  Anatomy from prior CT scan as well as procedural ultrasound and fluoroscopy were utilized during the procedure. Intravenous contrast allowed for opacification of the collecting system and partly surrounded the nonobstructing stone. However the stone was difficult to see given patient's I had. The lower pole was the intended target for access. The initial puncture accessed lower pole successfully. An angled glide catheter and wire were utilized to manipulate into the descending ureter successfully. Access is to be sufficient for nephrolithotomy. The balloon occlusion catheter was then deflated and withdrawn into the mid ureter and wire successfully advanced down the ureter. There is no significant contrast extravasation. The tract was then used to place the 6 Western Annita access sheath and side-port. The tip of the access sheath is in the ureter at the junction of the proximal and middle thirds. 2 separate safety wires were placed down the ureter and into the urinary bladder. There were no complications. Immediately following the percutaneous access and safety wire placement into the urinary bladder, the nephrolithotomy surgical procedure was to be performed. Successful percutaneous nephrostomy with antegrade pyelogram. The patient was now ready for subsequent nephrolithotomy. Us Renal Limited    Result Date: 9/17/2020  PROCEDURE: PERCUTANEOUS ANTEGRADE PYELOGRAM ULTRASOUND AND FLUOROSCOPIC GUIDANCE RIGHT PERCUTANEOUS NEPHROSTOMY TUBE PLACEMENT 9/17/2020 HISTORY: ORDERING SYSTEM PROVIDED HISTORY: Uric acid nephrolithiasis TECHNOLOGIST PROVIDED HISTORY: Reason for exam:->LEFT KIDNEY STONE Is the patient pregnant?->No; ORDERING SYSTEM PROVIDED HISTORY: Uric acid nephrolithiasis TECHNOLOGIST PROVIDED HISTORY: Reason for exam:->uric acid nephrolithiasis SEDATION: Performed by anesthesia department in the hybrid room CONTRAST: 10 cc nonvascular FLUOROSCOPY DOSE AND TYPE OR TIME AND EXPOSURES: 3.8 minutes 5 digital imaging sequences obtained.  TECHNIQUE Informed consent was previously obtained. The patient was under general anesthesia for the immediately prior retrograde balloon occlusion catheter placement into the proximal ureter. Still under anesthesia in the hybrid OR special procedures room, the patient was placed in the prone position left side slightly elevated, and an appropriate area posterior lateral aspect of the skin overlying the targeted collecting system and stone, was demarcated based upon prior CT scan planning as well as real-time ultrasound and fluoroscopy examination. The skin was sterilely prepared draped and anesthetized. Anatomy from prior CT scan as well as procedural ultrasound and fluoroscopy were utilized during the procedure. Intravenous contrast allowed for opacification of the collecting system and partly surrounded the nonobstructing stone. However the stone was difficult to see given patient's I had. The lower pole was the intended target for access. The initial puncture accessed lower pole successfully. An angled glide catheter and wire were utilized to manipulate into the descending ureter successfully. Access is to be sufficient for nephrolithotomy. The balloon occlusion catheter was then deflated and withdrawn into the mid ureter and wire successfully advanced down the ureter. There is no significant contrast extravasation. The tract was then used to place the 6 Western Annita access sheath and side-port. The tip of the access sheath is in the ureter at the junction of the proximal and middle thirds. 2 separate safety wires were placed down the ureter and into the urinary bladder. There were no complications. Immediately following the percutaneous access and safety wire placement into the urinary bladder, the nephrolithotomy surgical procedure was to be performed. Successful percutaneous nephrostomy with antegrade pyelogram. The patient was now ready for subsequent nephrolithotomy.      Fluoro For Surgical Procedures    Result Date: 9/17/2020  EXAMINATION: SPOT FLUOROSCOPIC IMAGES 9/17/2020 1:16 pm TECHNIQUE: Fluoroscopy was provided by the radiology department for procedure. Radiologist was not present during examination. FLUOROSCOPY DOSE AND TYPE OR TIME AND EXPOSURES: 3.8 minutes, 5 digital images. COMPARISON: None HISTORY: ORDERING SYSTEM PROVIDED HISTORY: Kidney stones Intraprocedural imaging. FINDINGS: 5 spot images of the left collecting system were obtained. These show sequential tract dilation, stone lithotomy in subsequent catheter placement on final imaging. Catheter position is in the renal pelvis. There is no extravasation. Intraprocedural fluoroscopic spot images as above. See separate procedure report for more information. PROCEDURES   Unless otherwise noted below, none     Procedures    CRITICAL CARE TIME   N/A    CONSULTS:  None      EMERGENCY DEPARTMENT COURSE and DIFFERENTIAL DIAGNOSIS/MDM:   Vitals:    Vitals:    09/19/20 2147   BP: 114/75   Pulse: 114   Resp: 18   Temp: 98.1 °F (36.7 °C)   TempSrc: Oral   SpO2: 97%   Weight: 185 lb (83.9 kg)   Height: 5' 1\" (1.549 m)       Patient was given the following medications:  Medications   0.9 % sodium chloride bolus (1,000 mLs Intravenous New Bag 9/19/20 2311)   morphine (PF) injection 4 mg (4 mg Intravenous Given 9/19/20 2311)           Patient is alert and oriented x4. Skin is pwd, no cyanosis or pallor. Moist mucus membranes. Heart with RRR. Lungs are clear to auscultation. There is left sided CVA tenderness noted on abdominal exam, as well as LLQ pain with palpation. There is a 2cm linear surgical incision noted to the left flank that is healing nicely. Bilateral lower extremities with equal strength.  Distal pulses and neurovascular status intact  Differentials: renal calculi, intractable abdominal pain, DONATO  Labs: mildly elevated creatinine and decreased GFR showing dehydration ( NS bolus given for that)  No signs of leukocytosis and lactic is normal  CT:There is a 4 mm obstructing stone at the left ureterovesical junction with   moderate left-sided hydronephrosis and hydroureter. Small amount of gas is seen within the bladder.  This may be related to   recent instrumentation or cystitis.  Clinical correlation recommended. Additional bilateral nephrolithiasis, left greater than right. NS bolus given along with morphine with improvement of symptoms on re-evaluation at 0125. Patient will be discharged with, flomax, phenergan and zofran. Follow up with urology this week. Patient instructed to watch her ibuprofen intake and rotate with tylenol. Patient states she is on Suboxone, so she will not take any narcotics at home. FINAL IMPRESSION      1. Kidney stone    2. Left flank pain    3. Dehydration          DISPOSITION/PLAN   DISPOSITION        PATIENT REFERREDTO:  Maryansteve Bell, [address] [place] [address],[address] [facility] [address]  [phone]    Schedule an appointment as soon as possible for a visit in 3 days  For recheck    Lois Valencia MD  [address] [facility]  [phone]    Schedule an appointment as soon as possible for a visit in 3 days  For recheck    Delmar Cardona  ED  [facility]  Go to   For new or worsening symptoms      DISCHARGE MEDICATIONS:  New Prescriptions    ONDANSETRON (ZOFRAN ODT) 4 MG DISINTEGRATING TABLET    Take 1 tablet by mouth every 8 hours as needed for Nausea    PROMETHAZINE (PHENERGAN) 25 MG TABLET    Take 1 tablet by mouth every 6 hours as needed for Nausea WARNING:  May cause drowsiness. May impair ability to operate vehicles or machinery. Do not use in combination with alcohol.     TAMSULOSIN (FLOMAX) 0.4 MG CAPSULE    Take 1 capsule by mouth daily for 5 doses       DISCONTINUED MEDICATIONS:  Discontinued Medications    No medications on file              (Please note that portions of this note were completed with a voice recognition program.  Efforts were made to edit the dictations but occasionally words are mis-transcribed.)    NILES Do - CNP (electronically signed)           NILES Do CNP  09/20/20 0142

## 2020-09-21 ENCOUNTER — TELEPHONE (OUTPATIENT)
Dept: FAMILY MEDICINE CLINIC | Age: 40
End: 2020-09-21

## 2020-09-21 ENCOUNTER — CARE COORDINATION (OUTPATIENT)
Dept: CARE COORDINATION | Age: 40
End: 2020-09-21

## 2020-09-21 ENCOUNTER — TELEPHONE (OUTPATIENT)
Dept: OTHER | Facility: CLINIC | Age: 40
End: 2020-09-21

## 2020-09-21 NOTE — TELEPHONE ENCOUNTER
RN Access attempted to contact pt to notify her of ED f/u appt. Attempt unsuccessful. Voicemail left stating time and date of appt.

## 2020-09-21 NOTE — TELEPHONE ENCOUNTER
[facility] Transitions Initial Follow Up Call    Call within 2 business days of discharge: Yes     Patient: Dana Arshad Patient : 1980 MRN: [id number]        RARS: No data recorded     Spoke with: Patient has appt .     Discharge department/facility: A      Follow Up  Future Appointments   Date Time Provider Dav Urbina   2020  8:40 AM NILES Acharya - JOSE Vallejo LPN

## 2020-09-23 ENCOUNTER — TELEPHONE (OUTPATIENT)
Dept: FAMILY MEDICINE CLINIC | Age: 40
End: 2020-09-23

## 2020-09-28 ENCOUNTER — CARE COORDINATION (OUTPATIENT)
Dept: CARE COORDINATION | Age: 40
End: 2020-09-28

## 2020-09-28 NOTE — CARE COORDINATION
Outreach attempt regarding 1 week f/u from pt recent visit to the ED. Pt was unable to reach; ACM left VM message with contact information.

## 2021-02-16 ENCOUNTER — VIRTUAL VISIT (OUTPATIENT)
Dept: FAMILY MEDICINE CLINIC | Age: 41
End: 2021-02-16
Payer: COMMERCIAL

## 2021-02-16 DIAGNOSIS — R11.2 NON-INTRACTABLE VOMITING WITH NAUSEA, UNSPECIFIED VOMITING TYPE: ICD-10-CM

## 2021-02-16 DIAGNOSIS — J45.20 MILD INTERMITTENT ASTHMA WITHOUT COMPLICATION: Primary | ICD-10-CM

## 2021-02-16 PROCEDURE — 99422 OL DIG E/M SVC 11-20 MIN: CPT | Performed by: FAMILY MEDICINE

## 2021-02-16 NOTE — PROGRESS NOTES
Subjective:      Patient ID: Ko Dick is a 41 y.o. female. HPI  This was a Tel visit due to the ongoing virus in the community-she was at home-alone-we had permission & I was home. Several complaints of worsening asthma x 9 mos-cough-using inhaler 3 x per week-nausea-some vomiting-hot & cold. Prior to Visit Medications :  Medication ondansetron (ZOFRAN ODT) 4 MG disintegrating tablet, Sig Take 1 tablet by mouth every 8 hours as needed for Nausea, Taking? , Authorizing Provider NILES Jin - CNP    Medication tamsulosin (FLOMAX) 0.4 MG capsule, Sig Take 1 capsule by mouth daily for 5 doses, Taking? , Authorizing Provider NILES Jin - JOSE    Medication ibuprofen (ADVIL;MOTRIN) 600 MG tablet, Sig Take 1.5 tablets by mouth every 6 hours as needed for Pain, Taking? , Authorizing Provider Galindo Randolph MD    Medication diphenhydrAMINE (BENADRYL) 50 MG capsule, Sig Take 50 mg by mouth nightly as needed for Sleep, Taking? , Authorizing Provider Historical Provider, MD    Medication albuterol sulfate  (90 Base) MCG/ACT inhaler, Sig Inhale 2 puffs into the lungs every 4 hours as needed for Wheezing May Sub as needed per insurance. , Taking? , Authorizing Provider SANTIAGO Irvin    Medication buprenorphine-naloxone (SUBOXONE) 8-2 MG SUBL SL tablet, Sig Place 1 tablet under the tongue daily. Pt takes 12 mg daily, Taking? , Authorizing Provider Historical Provider, MD    Medication QUEtiapine (SEROQUEL XR) 150 MG TB24 extended release tablet, Sig Take 150 mg by mouth nightly, Taking? , Authorizing Provider Historical Provider, MD      Past Medical History:  10/20/15: [facility]  No date: Anesthesia complication      Comment:  wakes from anesthesia with migraine  No date: Asthma      Comment:  Had real bad as child and then grew out of it and  then                got pregnant it started acting up again.   No date: Bipolar 1 disorder ([facility]  5/28/2015: Bipolar affective disorder, current episode depressed ([facility]  No date: Depression  No date: Kidney stones      Comment:  has had multiple kidney stones  No date: Migraine  No date: Nodule of left lung      Comment:  pt has non calcified nodules on both lungs  No date: Nodule of right lung  No date: Opiate addiction ([facility]      Comment:  Past hx  No date: Wears contact lenses        Review of Systems    Review of Systems   Constitutional:        See hpi       Objective:   Physical Exam      Physical Exam  Neurological:      Mental Status: She is alert and oriented to person, place, and time. Psychiatric:         Mood and Affect: Mood normal.         Behavior: Behavior normal.         Thought Content: Thought content normal.         Judgment: Judgment normal.         Assessment:      1. Mild intermittent asthma without complication    2.  Non-intractable vomiting with nausea, unspecified vomiting type            Plan:      Diagnoses and all orders for this visit:    Mild intermittent asthma without complication  Call for appt with Marlene  Non-intractable vomiting with nausea, unspecified vomiting type  As above            Shannan Maguire DO

## 2021-02-16 NOTE — PATIENT INSTRUCTIONS
Mild intermittent asthma without complication  Call for appt with Marlene  Non-intractable vomiting with nausea, unspecified vomiting type  As above

## 2021-02-18 ENCOUNTER — TELEPHONE (OUTPATIENT)
Dept: FAMILY MEDICINE CLINIC | Age: 41
End: 2021-02-18

## 2021-02-18 NOTE — TELEPHONE ENCOUNTER
----- Message from Elsy Hardy. sent at 2/18/2021 10:08 AM EST -----  Subject: Appointment Request    Reason for Call: Routine Existing Condition Follow Up    QUESTIONS  Type of Appointment? Established Patient  Reason for appointment request? Requested Provider unavailable - Dr. Brenda Rosas for Provider? Patient stated that she needs to   schedule a follow up appointment with Dr. Maura Mcknight for the latest   appointment. Please call to schedule   ---------------------------------------------------------------------------  --------------  CALL BACK INFO  What is the best way for the office to contact you? OK to leave message on   voicemail   OK to respond with electronic message via [employer] portal (only for   patients who have registered [employer] account)  Preferred Call Back Phone Number? [phone]  ---------------------------------------------------------------------------  --------------  SCRIPT ANSWERS  Relationship to Patient? Self  Appointment reason? Well Care/Follow Ups  Select a Well Care/Follow Ups appointment reason? Adult Existing Condition   Follow Up [Diabetes   CHF   COPD   Hypertension/Blood Pressure Check]  (Is the patient requesting to be seen urgently for their symptoms?)? No  Is this follow up request related to routine Diabetes Management? No  Are you having any new concerns about your existing condition? No  Have you been diagnosed with   tested for   or told that you are suspected of having COVID-19 (Coronavirus)? No  Have you had a fever or taken medication to treat a fever within the past   3 days? No  Have you had a cough   shortness of breath or flu-like symptoms within the past 3 days? No  Do you currently have flu-like symptoms including fever or chills   cough   shortness of breath   or difficulty breathing   or new loss of taste or smell? No  (Service Expert  click yes below to proceed with [employer] As Usual   Scheduling)?  Yes

## 2021-02-26 ENCOUNTER — OFFICE VISIT (OUTPATIENT)
Dept: FAMILY MEDICINE CLINIC | Age: 41
End: 2021-02-26
Payer: COMMERCIAL

## 2021-02-26 VITALS
WEIGHT: 186 LBS | OXYGEN SATURATION: 94 % | SYSTOLIC BLOOD PRESSURE: 122 MMHG | TEMPERATURE: 99.5 F | HEART RATE: 100 BPM | HEIGHT: 62 IN | RESPIRATION RATE: 20 BRPM | DIASTOLIC BLOOD PRESSURE: 68 MMHG | BODY MASS INDEX: 34.23 KG/M2

## 2021-02-26 DIAGNOSIS — J45.40 MODERATE PERSISTENT ASTHMA WITHOUT COMPLICATION: ICD-10-CM

## 2021-02-26 DIAGNOSIS — R50.9 FEVER, UNSPECIFIED FEVER CAUSE: ICD-10-CM

## 2021-02-26 DIAGNOSIS — K21.9 GASTROESOPHAGEAL REFLUX DISEASE WITHOUT ESOPHAGITIS: ICD-10-CM

## 2021-02-26 DIAGNOSIS — R06.02 SHORTNESS OF BREATH: ICD-10-CM

## 2021-02-26 DIAGNOSIS — R07.89 CHEST TIGHTNESS: Primary | ICD-10-CM

## 2021-02-26 DIAGNOSIS — R10.9 ABDOMINAL PAIN, UNSPECIFIED ABDOMINAL LOCATION: ICD-10-CM

## 2021-02-26 DIAGNOSIS — K59.00 CONSTIPATION, UNSPECIFIED CONSTIPATION TYPE: ICD-10-CM

## 2021-02-26 DIAGNOSIS — R10.2 SUPRAPUBIC ABDOMINAL PAIN: ICD-10-CM

## 2021-02-26 DIAGNOSIS — F17.200 TOBACCO DEPENDENCE: ICD-10-CM

## 2021-02-26 LAB
BILIRUBIN, POC: ABNORMAL
BLOOD URINE, POC: ABNORMAL
CLARITY, POC: CLEAR
COLOR, POC: YELLOW
GLUCOSE URINE, POC: ABNORMAL
KETONES, POC: ABNORMAL
LEUKOCYTE EST, POC: ABNORMAL
NITRITE, POC: ABNORMAL
PH, POC: 6
PROTEIN, POC: ABNORMAL
SPECIFIC GRAVITY, POC: 1.02
UROBILINOGEN, POC: 0.2

## 2021-02-26 PROCEDURE — G8427 DOCREV CUR MEDS BY ELIG CLIN: HCPCS | Performed by: PHYSICIAN ASSISTANT

## 2021-02-26 PROCEDURE — 4004F PT TOBACCO SCREEN RCVD TLK: CPT | Performed by: PHYSICIAN ASSISTANT

## 2021-02-26 PROCEDURE — 99214 OFFICE O/P EST MOD 30 MIN: CPT | Performed by: PHYSICIAN ASSISTANT

## 2021-02-26 PROCEDURE — 81002 URINALYSIS NONAUTO W/O SCOPE: CPT | Performed by: PHYSICIAN ASSISTANT

## 2021-02-26 PROCEDURE — G8417 CALC BMI ABV UP PARAM F/U: HCPCS | Performed by: PHYSICIAN ASSISTANT

## 2021-02-26 PROCEDURE — G8484 FLU IMMUNIZE NO ADMIN: HCPCS | Performed by: PHYSICIAN ASSISTANT

## 2021-02-26 RX ORDER — OMEPRAZOLE 40 MG/1
40 CAPSULE, DELAYED RELEASE ORAL
Qty: 90 CAPSULE | Refills: 1 | Status: SHIPPED | OUTPATIENT
Start: 2021-02-26

## 2021-02-26 RX ORDER — CARIPRAZINE 1.5 MG/1
1 CAPSULE, GELATIN COATED ORAL DAILY
COMMUNITY
Start: 2020-12-28 | End: 2022-03-28

## 2021-02-26 SDOH — ECONOMIC STABILITY: FOOD INSECURITY: WITHIN THE PAST 12 MONTHS, THE FOOD YOU BOUGHT JUST DIDN'T LAST AND YOU DIDN'T HAVE MONEY TO GET MORE.: SOMETIMES TRUE

## 2021-02-26 SDOH — ECONOMIC STABILITY: INCOME INSECURITY: HOW HARD IS IT FOR YOU TO PAY FOR THE VERY BASICS LIKE FOOD, HOUSING, MEDICAL CARE, AND HEATING?: HARD

## 2021-02-26 SDOH — ECONOMIC STABILITY: TRANSPORTATION INSECURITY
IN THE PAST 12 MONTHS, HAS LACK OF TRANSPORTATION KEPT YOU FROM MEETINGS, WORK, OR FROM GETTING THINGS NEEDED FOR DAILY LIVING?: NO

## 2021-02-26 ASSESSMENT — ENCOUNTER SYMPTOMS
VOMITING: 0
COUGH: 1
CONSTIPATION: 1
SHORTNESS OF BREATH: 1
CHEST TIGHTNESS: 1
SORE THROAT: 0
NAUSEA: 1
ABDOMINAL PAIN: 0
DIARRHEA: 0
RHINORRHEA: 0

## 2021-02-26 NOTE — PROGRESS NOTES
2021  Marylu Rios (: 1980)  41 y.o. HPI  Very sick in 2019- thinks she had covid  Has worsened her asthma significantly  Was told that she might have copd   Smokes 1 ppd for the last 23 years. Also gets major hot flashes, all the time. Then has chills  Has been on hormones with no improvement. Patient has been told she was postmenopausal confirmed with blood work. Nausea started 6 months ago. Worse when in the car  Has to stop and throw up if she's driving. Worsening reflux. Occasional difficulty swallowing, \"gets stuck\". Denies odynophagia. Has been having fevers for the last 2 years. Will go a couple of weeks and then get 101  Improves with motrin then returns. Also with suprapubic abdominal pressure. H/o kidney stones, no flank pain. And constipation. Hard stools, no bleeding. Denies melena. Review of Systems   Constitutional: Positive for fatigue, fever and unexpected weight change (gain and loss). Negative for activity change and chills. HENT: Negative for congestion, ear pain, rhinorrhea and sore throat. Eyes: Negative for visual disturbance. Respiratory: Positive for cough, chest tightness and shortness of breath. Cardiovascular: Negative for chest pain and palpitations. Gastrointestinal: Positive for constipation and nausea. Negative for abdominal pain, diarrhea and vomiting. Genitourinary: Negative for difficulty urinating and dysuria. Musculoskeletal: Negative for arthralgias and myalgias. Skin: Negative for rash. Neurological: Negative for dizziness, weakness and numbness. Psychiatric/Behavioral: Negative for sleep disturbance. The patient is nervous/anxious. Allergies, past medical history, family history, and social history reviewed and unchanged from previous encounter.      Current Outpatient Medications   Medication Sig Dispense Refill    omeprazole (PRILOSEC) 40 MG delayed release capsule Take 1 capsule by mouth Conjunctivae normal.      Pupils: Pupils are equal, round, and reactive to light. Neck:      Musculoskeletal: Neck supple. Cardiovascular:      Rate and Rhythm: Normal rate and regular rhythm. Heart sounds: Normal heart sounds. No murmur. Pulmonary:      Effort: Pulmonary effort is normal.      Breath sounds: Normal breath sounds. No wheezing. Abdominal:      General: Bowel sounds are normal.      Palpations: Abdomen is soft. Tenderness: There is abdominal tenderness in the right upper quadrant, right lower quadrant, epigastric area, suprapubic area and left upper quadrant. Lymphadenopathy:      Cervical: No cervical adenopathy. Skin:     General: Skin is warm and dry. Findings: No rash. Neurological:      Mental Status: She is alert and oriented to person, place, and time. ASSESSMENT and PLAN:  Everton Campa was seen today for shortness of breath, wheezing and nausea. Diagnoses and all orders for this visit:    Chest tightness  Shortness of breath  - asthma vs copd?   - ordered PFTs   - encouraged patient to consider tobacco cessation, not ready at this time. - with persistent cough, would consider imaging of the chest.     Fever, unspecified fever cause  -     CBC WITH AUTO DIFFERENTIAL; Future  -     COMPREHENSIVE METABOLIC PANEL; Future  -     TSH with Reflex; Future  - Check labs, If negative, would consider follow up with ID. Abdominal pain, unspecified abdominal location  -     LIPASE; Future  -     AMYLASE; Future  - suspect gerd, start ppi, labs per orders    Gastroesophageal reflux disease without esophagitis  -     omeprazole (PRILOSEC) 40 MG delayed release capsule; Take 1 capsule by mouth every morning (before breakfast)    Moderate persistent asthma without complication  Tobacco dependence  -     Full PFT Study With Bronchodilator; Future    Constipation, unspecified constipation type  - colace BID with miralax q3 days.     Suprapubic abdominal pain  -     POCT Urinalysis no Micro - trace leuks   -     Culture, Urine      Return if symptoms worsen or fail to improve, pending work up. Philippe Burdick

## 2021-02-27 LAB
A/G RATIO: 1.7 (ref 1.1–2.2)
ALBUMIN SERPL-MCNC: 4 G/DL (ref 3.4–5)
ALP BLD-CCNC: 92 U/L (ref 40–129)
ALT SERPL-CCNC: 11 U/L (ref 10–40)
AMYLASE: 112 U/L (ref 25–115)
ANION GAP SERPL CALCULATED.3IONS-SCNC: 7 MMOL/L (ref 3–16)
AST SERPL-CCNC: 14 U/L (ref 15–37)
BASOPHILS ABSOLUTE: 0.1 K/UL (ref 0–0.2)
BASOPHILS RELATIVE PERCENT: 1 %
BILIRUB SERPL-MCNC: <0.2 MG/DL (ref 0–1)
BUN BLDV-MCNC: 17 MG/DL (ref 7–20)
CALCIUM SERPL-MCNC: 9.7 MG/DL (ref 8.3–10.6)
CHLORIDE BLD-SCNC: 106 MMOL/L (ref 99–110)
CO2: 29 MMOL/L (ref 21–32)
CREAT SERPL-MCNC: 1 MG/DL (ref 0.6–1.1)
EOSINOPHILS ABSOLUTE: 0.4 K/UL (ref 0–0.6)
EOSINOPHILS RELATIVE PERCENT: 6.7 %
GFR AFRICAN AMERICAN: >60
GFR NON-AFRICAN AMERICAN: >60
GLOBULIN: 2.4 G/DL
GLUCOSE BLD-MCNC: 84 MG/DL (ref 70–99)
HCT VFR BLD CALC: 42.9 % (ref 36–48)
HEMOGLOBIN: 14.4 G/DL (ref 12–16)
LIPASE: 33 U/L (ref 13–60)
LYMPHOCYTES ABSOLUTE: 1.9 K/UL (ref 1–5.1)
LYMPHOCYTES RELATIVE PERCENT: 31.9 %
MCH RBC QN AUTO: 33.5 PG (ref 26–34)
MCHC RBC AUTO-ENTMCNC: 33.5 G/DL (ref 31–36)
MCV RBC AUTO: 99.8 FL (ref 80–100)
MONOCYTES ABSOLUTE: 0.4 K/UL (ref 0–1.3)
MONOCYTES RELATIVE PERCENT: 7.2 %
NEUTROPHILS ABSOLUTE: 3.2 K/UL (ref 1.7–7.7)
NEUTROPHILS RELATIVE PERCENT: 53.2 %
PDW BLD-RTO: 13.4 % (ref 12.4–15.4)
PLATELET # BLD: 199 K/UL (ref 135–450)
PMV BLD AUTO: 10.4 FL (ref 5–10.5)
POTASSIUM SERPL-SCNC: 4.7 MMOL/L (ref 3.5–5.1)
RBC # BLD: 4.3 M/UL (ref 4–5.2)
SODIUM BLD-SCNC: 142 MMOL/L (ref 136–145)
TOTAL PROTEIN: 6.4 G/DL (ref 6.4–8.2)
TSH REFLEX: 1.57 UIU/ML (ref 0.27–4.2)
WBC # BLD: 5.9 K/UL (ref 4–11)

## 2021-02-28 LAB — URINE CULTURE, ROUTINE: NORMAL

## 2021-03-05 ENCOUNTER — TELEPHONE (OUTPATIENT)
Dept: FAMILY MEDICINE CLINIC | Age: 41
End: 2021-03-05

## 2021-03-05 DIAGNOSIS — R50.9 FEVER OF UNKNOWN ORIGIN: Primary | ICD-10-CM

## 2021-03-05 NOTE — TELEPHONE ENCOUNTER
----- Message from Melissa Block sent at 3/5/2021  3:08 PM EST -----  Subject: Message to Provider    QUESTIONS  Information for Provider? patient is calling to discuss her results of   blood work that taken last week. .she has accessed them on [facility] just   would like to f/u with Dr Zafar Carbone   ---------------------------------------------------------------------------  --------------  [address]  What is the best way for the office to contact you? OK to leave message on   voicemail  Preferred Call Back Phone Number? [phone]  ---------------------------------------------------------------------------  --------------  SCRIPT ANSWERS  Relationship to Patient?  Self

## 2021-03-05 NOTE — TELEPHONE ENCOUNTER
Reviewed normal lab results. Still with ongoing unexplainable fevers, recommend follow up with ID  Referral placed.

## 2021-04-13 ENCOUNTER — HOSPITAL ENCOUNTER (OUTPATIENT)
Age: 41
Discharge: HOME | End: 2021-04-13
Payer: COMMERCIAL

## 2021-04-13 PROCEDURE — 93005 ELECTROCARDIOGRAM TRACING: CPT

## 2021-04-13 PROCEDURE — 93010 ELECTROCARDIOGRAM REPORT: CPT | Performed by: INTERNAL MEDICINE

## 2021-04-16 ENCOUNTER — HOSPITAL ENCOUNTER (OUTPATIENT)
Age: 41
Discharge: HOME | End: 2021-04-16
Payer: COMMERCIAL

## 2021-04-19 LAB
EKG ATRIAL RATE: 69 BPM
EKG DIAGNOSIS: NORMAL
EKG P-R INTERVAL: 132 MS
EKG Q-T INTERVAL: 382 MS
EKG QRS DURATION: 82 MS
EKG QTC CALCULATION (BAZETT): 409 MS
EKG R AXIS: 113 DEGREES
EKG T AXIS: 124 DEGREES
EKG VENTRICULAR RATE: 69 BPM

## 2021-04-20 ENCOUNTER — TELEPHONE (OUTPATIENT)
Dept: FAMILY MEDICINE CLINIC | Age: 41
End: 2021-04-20

## 2021-04-20 DIAGNOSIS — R94.31 ABNORMAL ECG: Primary | ICD-10-CM

## 2021-04-20 DIAGNOSIS — I45.9 INTRA-VENTRICULAR CONDUCTION DELAY: ICD-10-CM

## 2021-07-03 ENCOUNTER — APPOINTMENT (OUTPATIENT)
Dept: CT IMAGING | Age: 41
End: 2021-07-03
Payer: COMMERCIAL

## 2021-07-03 ENCOUNTER — HOSPITAL ENCOUNTER (EMERGENCY)
Age: 41
Discharge: HOME | End: 2021-07-03
Payer: COMMERCIAL

## 2021-07-03 VITALS
HEART RATE: 91 BPM | RESPIRATION RATE: 16 BRPM | DIASTOLIC BLOOD PRESSURE: 73 MMHG | OXYGEN SATURATION: 100 % | TEMPERATURE: 97.9 F | SYSTOLIC BLOOD PRESSURE: 114 MMHG

## 2021-07-03 DIAGNOSIS — N12 PYELONEPHRITIS: Primary | ICD-10-CM

## 2021-07-03 LAB
A/G RATIO: 1.5 (ref 1.1–2.2)
ALBUMIN SERPL-MCNC: 4.4 G/DL (ref 3.4–5)
ALP BLD-CCNC: 102 U/L (ref 40–129)
ALT SERPL-CCNC: 18 U/L (ref 10–40)
ANION GAP SERPL CALCULATED.3IONS-SCNC: 12 MMOL/L (ref 3–16)
AST SERPL-CCNC: 25 U/L (ref 15–37)
BACTERIA: ABNORMAL /HPF
BASOPHILS ABSOLUTE: 0.1 K/UL (ref 0–0.2)
BASOPHILS RELATIVE PERCENT: 1 %
BILIRUB SERPL-MCNC: 1 MG/DL (ref 0–1)
BILIRUBIN URINE: ABNORMAL
BLOOD, URINE: ABNORMAL
BUN BLDV-MCNC: 17 MG/DL (ref 7–20)
CALCIUM SERPL-MCNC: 10.1 MG/DL (ref 8.3–10.6)
CHLORIDE BLD-SCNC: 102 MMOL/L (ref 99–110)
CLARITY: ABNORMAL
CO2: 24 MMOL/L (ref 21–32)
COLOR: ABNORMAL
COMMENT UA: ABNORMAL
CREAT SERPL-MCNC: 1.1 MG/DL (ref 0.6–1.1)
EOSINOPHILS ABSOLUTE: 0.2 K/UL (ref 0–0.6)
EOSINOPHILS RELATIVE PERCENT: 2.9 %
EPITHELIAL CELLS, UA: 6 /HPF (ref 0–5)
GFR AFRICAN AMERICAN: >60
GFR NON-AFRICAN AMERICAN: 54
GLOBULIN: 3 G/DL
GLUCOSE BLD-MCNC: 109 MG/DL (ref 70–99)
GLUCOSE URINE: NEGATIVE MG/DL
HCG QUALITATIVE: NEGATIVE
HCT VFR BLD CALC: 41.8 % (ref 36–48)
HEMOGLOBIN: 14.6 G/DL (ref 12–16)
HYALINE CASTS: ABNORMAL /LPF (ref 0–2)
KETONES, URINE: 15 MG/DL
LEUKOCYTE ESTERASE, URINE: ABNORMAL
LIPASE: 24 U/L (ref 13–60)
LYMPHOCYTES ABSOLUTE: 2.1 K/UL (ref 1–5.1)
LYMPHOCYTES RELATIVE PERCENT: 30.6 %
MCH RBC QN AUTO: 33.9 PG (ref 26–34)
MCHC RBC AUTO-ENTMCNC: 34.9 G/DL (ref 31–36)
MCV RBC AUTO: 97.1 FL (ref 80–100)
MICROSCOPIC EXAMINATION: YES
MONOCYTES ABSOLUTE: 0.5 K/UL (ref 0–1.3)
MONOCYTES RELATIVE PERCENT: 6.8 %
MUCUS: ABNORMAL /LPF
NEUTROPHILS ABSOLUTE: 4 K/UL (ref 1.7–7.7)
NEUTROPHILS RELATIVE PERCENT: 58.7 %
NITRITE, URINE: NEGATIVE
PDW BLD-RTO: 13.1 % (ref 12.4–15.4)
PH UA: 6 (ref 5–8)
PLATELET # BLD: 231 K/UL (ref 135–450)
PMV BLD AUTO: 9.5 FL (ref 5–10.5)
POTASSIUM REFLEX MAGNESIUM: 4 MMOL/L (ref 3.5–5.1)
PROTEIN UA: 30 MG/DL
RBC # BLD: 4.31 M/UL (ref 4–5.2)
RBC UA: ABNORMAL /HPF (ref 0–4)
SODIUM BLD-SCNC: 138 MMOL/L (ref 136–145)
SPECIFIC GRAVITY UA: 1.02 (ref 1–1.03)
TOTAL PROTEIN: 7.4 G/DL (ref 6.4–8.2)
URINE REFLEX TO CULTURE: YES
URINE TYPE: ABNORMAL
UROBILINOGEN, URINE: 0.2 E.U./DL
WBC # BLD: 6.9 K/UL (ref 4–11)
WBC UA: 51 /HPF (ref 0–5)

## 2021-07-03 PROCEDURE — 96375 TX/PRO/DX INJ NEW DRUG ADDON: CPT

## 2021-07-03 PROCEDURE — 80053 COMPREHEN METABOLIC PANEL: CPT

## 2021-07-03 PROCEDURE — 74176 CT ABD & PELVIS W/O CONTRAST: CPT

## 2021-07-03 PROCEDURE — 85025 COMPLETE CBC W/AUTO DIFF WBC: CPT

## 2021-07-03 PROCEDURE — 36415 COLL VENOUS BLD VENIPUNCTURE: CPT

## 2021-07-03 PROCEDURE — 84703 CHORIONIC GONADOTROPIN ASSAY: CPT

## 2021-07-03 PROCEDURE — 6360000002 HC RX W HCPCS: Performed by: PHYSICIAN ASSISTANT

## 2021-07-03 PROCEDURE — 96365 THER/PROPH/DIAG IV INF INIT: CPT

## 2021-07-03 PROCEDURE — 87086 URINE CULTURE/COLONY COUNT: CPT

## 2021-07-03 PROCEDURE — 2580000003 HC RX 258: Performed by: PHYSICIAN ASSISTANT

## 2021-07-03 PROCEDURE — 83690 ASSAY OF LIPASE: CPT

## 2021-07-03 PROCEDURE — 81001 URINALYSIS AUTO W/SCOPE: CPT

## 2021-07-03 PROCEDURE — 99283 EMERGENCY DEPT VISIT LOW MDM: CPT

## 2021-07-03 RX ORDER — KETOROLAC TROMETHAMINE 15 MG/ML
30 INJECTION, SOLUTION INTRAMUSCULAR; INTRAVENOUS ONCE
Status: COMPLETED | OUTPATIENT
Start: 2021-07-03 | End: 2021-07-03

## 2021-07-03 RX ORDER — ONDANSETRON 4 MG/1
4 TABLET, ORALLY DISINTEGRATING ORAL EVERY 8 HOURS PRN
Qty: 10 TABLET | Refills: 0 | Status: SHIPPED | OUTPATIENT
Start: 2021-07-03

## 2021-07-03 RX ORDER — CEFUROXIME AXETIL 500 MG/1
500 TABLET ORAL 2 TIMES DAILY
Qty: 14 TABLET | Refills: 0 | Status: SHIPPED | OUTPATIENT
Start: 2021-07-03 | End: 2021-07-10

## 2021-07-03 RX ORDER — ONDANSETRON 2 MG/ML
4 INJECTION INTRAMUSCULAR; INTRAVENOUS ONCE
Status: COMPLETED | OUTPATIENT
Start: 2021-07-03 | End: 2021-07-03

## 2021-07-03 RX ORDER — IBUPROFEN 600 MG/1
600 TABLET ORAL EVERY 6 HOURS PRN
Qty: 30 TABLET | Refills: 0 | Status: SHIPPED | OUTPATIENT
Start: 2021-07-03

## 2021-07-03 RX ORDER — ACETAMINOPHEN 500 MG
1000 TABLET ORAL EVERY 6 HOURS PRN
Qty: 40 TABLET | Refills: 0 | Status: SHIPPED | OUTPATIENT
Start: 2021-07-03

## 2021-07-03 RX ORDER — 0.9 % SODIUM CHLORIDE 0.9 %
1000 INTRAVENOUS SOLUTION INTRAVENOUS ONCE
Status: COMPLETED | OUTPATIENT
Start: 2021-07-03 | End: 2021-07-03

## 2021-07-03 RX ADMIN — HYDROMORPHONE HYDROCHLORIDE 1 MG: 1 INJECTION, SOLUTION INTRAMUSCULAR; INTRAVENOUS; SUBCUTANEOUS at 14:01

## 2021-07-03 RX ADMIN — CEFTRIAXONE 1000 MG: 1 INJECTION, POWDER, FOR SOLUTION INTRAMUSCULAR; INTRAVENOUS at 14:35

## 2021-07-03 RX ADMIN — SODIUM CHLORIDE 1000 ML: 9 INJECTION, SOLUTION INTRAVENOUS at 13:28

## 2021-07-03 RX ADMIN — KETOROLAC TROMETHAMINE 30 MG: 15 INJECTION, SOLUTION INTRAMUSCULAR; INTRAVENOUS at 13:29

## 2021-07-03 RX ADMIN — ONDANSETRON 4 MG: 2 INJECTION INTRAMUSCULAR; INTRAVENOUS at 13:29

## 2021-07-03 ASSESSMENT — ENCOUNTER SYMPTOMS
VOMITING: 0
SHORTNESS OF BREATH: 0
NAUSEA: 1
SORE THROAT: 0
ABDOMINAL PAIN: 0
BACK PAIN: 1

## 2021-07-03 ASSESSMENT — PAIN SCALES - GENERAL
PAINLEVEL_OUTOF10: 10
PAINLEVEL_OUTOF10: 10
PAINLEVEL_OUTOF10: 6
PAINLEVEL_OUTOF10: 9

## 2021-07-03 ASSESSMENT — PAIN DESCRIPTION - PAIN TYPE: TYPE: ACUTE PAIN

## 2021-07-03 ASSESSMENT — PAIN DESCRIPTION - LOCATION: LOCATION: FLANK

## 2021-07-03 ASSESSMENT — PAIN DESCRIPTION - ORIENTATION: ORIENTATION: RIGHT

## 2021-07-03 ASSESSMENT — PAIN DESCRIPTION - DESCRIPTORS: DESCRIPTORS: SHARP

## 2021-07-03 ASSESSMENT — PAIN - FUNCTIONAL ASSESSMENT: PAIN_FUNCTIONAL_ASSESSMENT: 0-10

## 2021-07-03 ASSESSMENT — PAIN DESCRIPTION - FREQUENCY: FREQUENCY: CONTINUOUS

## 2021-07-03 NOTE — ED TRIAGE NOTES
Pt arrived to ED for a complaint of right flank pain. Pt states that she has a hx of kidney stones and states that her kidney \"has stones in it\". Pt states that the pain became worse today. Pt states she has nausea. VS noted and stable. Patient A&Ox4. Respirations easy and unlabored. Skin warm and dry and appropriate for ethnicity. No acute distress noted at this time. Patient placed on continuous pulse ox upon arrival to room.

## 2021-07-03 NOTE — ED NOTES
Provider order placed for patient's discharge. Provider reviewed decision to discharge with the patient. Discharge paperwork and any prescriptions were reviewed with the patient. Patient verbalized understanding of discharge education and any prescriptions and has no further questions or further needs at this time. Patient left with all personal belongings and was stable upon departure. Patient thanked for choosing [facility] ([facility]) and informed to return should any need arise.        Pamela Solomon RN  07/03/21 4673

## 2021-07-03 NOTE — ED NOTES
Pt uncomfortable and requesting to not be on monitor due to not being able to stay still.      Avril Hall, RN  07/03/21 3145

## 2021-07-03 NOTE — ED PROVIDER NOTES
[address] [facility]      Pt Name: Andre Sánchez  MRN: [id number]  [place] 1980  Date of evaluation: 7/3/2021  Provider: Carlitos Beal PA-C    This patient was not seen and evaluated by the attending physician No att. providers found. CHIEF COMPLAINT      Chief Complaint: Flank pain      HISTORYOF PRESENT ILLNESS  (Location/Symptom, Timing/Onset, Context/Setting, Quality, Duration, Modifying Factors, Severity.)   Andre Sánchez is a 35 y.o. female who presents to the emergency department complaining of right flank pain which started last night. Pain is intermittent. It is waxing and waning. She rates it as 10 out of 10. It does seem to worsen with certain positions or movement. Nothing makes it better. She reports associated nausea. Says urine is much darker than normal.  Denies dysuria, hematuria, vomiting, fevers. She has a history of kidney stones and states this feels similar. Nursing Notes were reviewed and I agree. REVIEW OF SYSTEMS    (2-9 systems for level 4, 10 or more forlevel 5)     Review of Systems   Constitutional: Negative for chills and fever. HENT: Negative for sore throat. Respiratory: Negative for shortness of breath. Cardiovascular: Negative for chest pain. Gastrointestinal: Positive for nausea. Negative for abdominal pain and vomiting. Genitourinary: Positive for flank pain. Negative for difficulty urinating and dysuria. Urine dark   Musculoskeletal: Positive for back pain. Skin: Negative for rash. Neurological: Negative for light-headedness and headaches. Psychiatric/Behavioral: The patient is not nervous/anxious. All other systems reviewed and are negative. Positives and Pertinent negatives as per HPI. Except as noted above the remainder of the review of systems was reviewed and negative.        PAST MEDICALHISTORY         Diagnosis Date    Acute ischemic colitis [facility]) 10/20/15    Anesthesia complication     wakes from anesthesia with migraine    Asthma     Had real bad as child and then grew out of it and  then got pregnant it started acting up again.  Bipolar 1 disorder ([address]     Bipolar affective disorder, current episode depressed ([facility] [address] 5/28/2015    Depression     Kidney stones     has had multiple kidney stones    Migraine     Nodule of left lung     pt has non calcified nodules on both lungs    Nodule of right lung     Opiate addiction ([address]     Past hx    Wears contact lenses        SURGICAL HISTORY           Procedure Laterality Date    COLONOSCOPY  10/20/15    possible ischemic colitis    CYSTOSCOPY Left 08/13/2020    left uretroscopy, with stone extraction    CYSTOSCOPY INSERTION / REMOVAL STENT / STONE Left 8/13/2020    CYSTOSCOPY, URETEROSCOPY, HOLMIUM LASER LITHOTRIPSY, STONE EXTRACTION performed by Gabrielle Sutton MD at [facility]  10/11/2016    Hysteroscopy, Novasure Ablation    LITHOTRIPSY      x 7    NEPHROLITHOTOMY Left 9/17/2020    LEFT PERCUTANEOUS NEPHROLITHOTOMY, CYSTOSCOPY WITH HOLMIUM LASER LITHOTRIPSY performed by Archana Browne MD at [facility] [address] NEPHROSTOMY Left 9/17/2020    NEPHROSTOMY PERCUTANEOUS TUBE INSERTION performed by Archana Browne MD at [address]  2007    TUBAL LIGATION  2001    URETER [address] [facility] [address]      X 2 then removed       CURRENT MEDICATIONS       Previous Medications    ALBUTEROL SULFATE  (90 BASE) MCG/ACT INHALER    Inhale 2 puffs into the lungs every 4 hours as needed for Wheezing May Sub as needed per insurance. BUPRENORPHINE-NALOXONE (SUBOXONE) 8-2 MG SUBL SL TABLET    Place 1 tablet under the tongue daily.  Pt takes 12 mg daily    DIPHENHYDRAMINE (BENADRYL) 50 MG CAPSULE    Take 50 mg by mouth nightly as needed for Sleep    OMEPRAZOLE (PRILOSEC) 40 MG DELAYED RELEASE CAPSULE    Take 1 capsule by mouth every WBC, UA 51 (*)     Epithelial Cells, UA 6 (*)     All other components within normal limits    Narrative:     Performed at:  [facility]  [address] [facility] [address]   Phone [phone]   CULTURE, URINE   HCG, SERUM, QUALITATIVE    Narrative:     Performed at:  [facility]  [address] [facility] [address]   Phone [phone]   CBC WITH AUTO DIFFERENTIAL    Narrative:     Performed at:  [facility]  [address] [facility] [address]   Phone [phone]   LIPASE    Narrative:     Performed at:  [facility]  [address] [facility] [address]   Phone [phone]       When ordered, only abnormal lab results are displayed. All other labs are within normal range or not returned as of the dictation. EMERGENCY DEPARTMENT COURSE and DIFFERENTIAL DIAGNOSIS/MDM:   Vitals:    Vitals:    07/03/21 1300 07/03/21 1502   BP: 102/80 112/78   Pulse: 115 97   Resp: 20 16   Temp:  97.9 °F (36.6 °C)   TempSrc:  Oral   SpO2: 99% 98%        I have evaluated this patient. My supervising physician was available for consultation. On arrival she was tachycardic at 115. She is afebrile. Patient has no peritoneal signs on abdominal exam.  Her pain is reproduced on exam and with movement of the right flank. Pregnancy test is negative. She is normal white blood cell count. Stable H&H. Electrolytes normal.  Renal function stable. LFTs normal.  Lipase normal.  Urine is turbid with large blood, moderate leukocyte esterase, 1+ bacteria and 51 white blood cells. Urine culture sent and she was given a dose of IV Rocephin. After fluids, Toradol, Zofran and Dilaudid she is feeling better and comfortable plan for discharge on oral antibiotics.   She was prescribed ibuprofen and Tylenol for pain, Zofran for nausea and was placed on 500 mg Ceftin twice daily for 7 days.    Discussed results, diagnosis and plan with patient and/or family. Questions addressed. Dispositionand follow-up agreed upon. Specific discharge instructions explained. The patient and/or family and I have discussed the diagnosis and risks, and we agree with discharging home to follow-up with their primary care,specialist or referral doctor. We also discussed returning to the Emergency Department immediately if new or worsening symptoms occur. We have discussed the symptoms which are most concerning that necessitate immediatereturn. PROCEDURES:  None    FINAL IMPRESSION      1. Pyelonephritis          DISPOSITION/PLAN   DISPOSITION Discharge - Pending Orders Complete 07/03/2021 03:08:10 PM      PATIENT REFERRED TO:  Iman Jones [facility] [address]  [phone]    Schedule an appointment as soon as possible for a visit       [facility] Emergency Department  [address] [place] [address]  [phone]    If symptoms worsen      MEDICATIONS:  New Prescriptions    ACETAMINOPHEN (APAP EXTRA STRENGTH) 500 MG TABLET    Take 2 tablets by mouth every 6 hours as needed for Pain    CEFUROXIME (CEFTIN) 500 MG TABLET    Take 1 tablet by mouth 2 times daily for 7 days    IBUPROFEN (IBU) 600 MG TABLET    Take 1 tablet by mouth every 6 hours as needed for Pain    ONDANSETRON (ZOFRAN ODT) 4 MG DISINTEGRATING TABLET    Take 1 tablet by mouth every 8 hours as needed for Nausea Let dissolve in mouth.        (Please note that portions of this note were completed with a voice recognition program.  Efforts were made toedit the dictations but occasionally words are mis-transcribed.)    THONG Alejandre PA-C  07/03/21 8202

## 2021-07-04 LAB — URINE CULTURE, ROUTINE: NORMAL

## 2021-08-30 ENCOUNTER — TELEPHONE (OUTPATIENT)
Dept: FAMILY MEDICINE CLINIC | Age: 41
End: 2021-08-30

## 2021-08-30 NOTE — TELEPHONE ENCOUNTER
----- Message from Jewell Ng sent at 8/30/2021 11:24 AM EDT -----  Subject: Message to Provider    QUESTIONS  Information for Provider? Dr Carly Hines. .... patient wants a call has   some questions about her thyroid her neck is a lil swollen would like to   speak with someone  ---------------------------------------------------------------------------  --------------  CALL BACK INFO  What is the best way for the office to contact you? OK to leave message on   voicemail  Preferred Call Back Phone Number? [phone]  ---------------------------------------------------------------------------  --------------  SCRIPT ANSWERS  Relationship to Patient?  Self

## 2021-08-31 NOTE — TELEPHONE ENCOUNTER
Please call patient for additional information, will likely need OV with physical exam and potentially labs.

## 2021-09-30 PROCEDURE — 99284 EMERGENCY DEPT VISIT MOD MDM: CPT

## 2021-09-30 ASSESSMENT — PAIN DESCRIPTION - LOCATION: LOCATION: FLANK

## 2021-09-30 ASSESSMENT — PAIN DESCRIPTION - DESCRIPTORS: DESCRIPTORS: CRAMPING

## 2021-09-30 ASSESSMENT — PAIN DESCRIPTION - PAIN TYPE: TYPE: ACUTE PAIN

## 2021-09-30 ASSESSMENT — PAIN DESCRIPTION - ORIENTATION: ORIENTATION: RIGHT;LEFT

## 2021-09-30 ASSESSMENT — PAIN SCALES - GENERAL: PAINLEVEL_OUTOF10: 8

## 2021-10-01 ENCOUNTER — HOSPITAL ENCOUNTER (INPATIENT)
Age: 41
LOS: 1 days | Discharge: HOME | DRG: 660 | End: 2021-10-02
Attending: EMERGENCY MEDICINE | Admitting: INTERNAL MEDICINE
Payer: COMMERCIAL

## 2021-10-01 ENCOUNTER — APPOINTMENT (OUTPATIENT)
Dept: GENERAL RADIOLOGY | Age: 41
DRG: 660 | End: 2021-10-01
Payer: COMMERCIAL

## 2021-10-01 ENCOUNTER — ANESTHESIA (OUTPATIENT)
Dept: OPERATING ROOM | Age: 41
DRG: 660 | End: 2021-10-01
Payer: COMMERCIAL

## 2021-10-01 ENCOUNTER — ANESTHESIA EVENT (OUTPATIENT)
Dept: OPERATING ROOM | Age: 41
DRG: 660 | End: 2021-10-01
Payer: COMMERCIAL

## 2021-10-01 ENCOUNTER — APPOINTMENT (OUTPATIENT)
Dept: CT IMAGING | Age: 41
DRG: 660 | End: 2021-10-01
Payer: COMMERCIAL

## 2021-10-01 VITALS
OXYGEN SATURATION: 100 % | SYSTOLIC BLOOD PRESSURE: 100 MMHG | DIASTOLIC BLOOD PRESSURE: 53 MMHG | RESPIRATION RATE: 19 BRPM

## 2021-10-01 DIAGNOSIS — N39.0 ACUTE UTI: ICD-10-CM

## 2021-10-01 DIAGNOSIS — N20.1 URETEROLITHIASIS: Primary | ICD-10-CM

## 2021-10-01 LAB
A/G RATIO: 1.3 (ref 1.1–2.2)
ALBUMIN SERPL-MCNC: 3.5 G/DL (ref 3.4–5)
ALP BLD-CCNC: 96 U/L (ref 40–129)
ALT SERPL-CCNC: 23 U/L (ref 10–40)
ANION GAP SERPL CALCULATED.3IONS-SCNC: 10 MMOL/L (ref 3–16)
AST SERPL-CCNC: 19 U/L (ref 15–37)
BACTERIA: ABNORMAL /HPF
BASOPHILS ABSOLUTE: 0.1 K/UL (ref 0–0.2)
BASOPHILS RELATIVE PERCENT: 1.3 %
BILIRUB SERPL-MCNC: 0.7 MG/DL (ref 0–1)
BILIRUBIN URINE: NEGATIVE
BLOOD, URINE: ABNORMAL
BUN BLDV-MCNC: 19 MG/DL (ref 7–20)
CALCIUM SERPL-MCNC: 10.9 MG/DL (ref 8.3–10.6)
CHLORIDE BLD-SCNC: 101 MMOL/L (ref 99–110)
CLARITY: ABNORMAL
CO2: 24 MMOL/L (ref 21–32)
COLOR: YELLOW
CREAT SERPL-MCNC: 1.4 MG/DL (ref 0.6–1.1)
EOSINOPHILS ABSOLUTE: 0.3 K/UL (ref 0–0.6)
EOSINOPHILS RELATIVE PERCENT: 3.1 %
EPITHELIAL CELLS, UA: ABNORMAL /HPF (ref 0–5)
GFR AFRICAN AMERICAN: 50
GFR NON-AFRICAN AMERICAN: 41
GLOBULIN: 2.7 G/DL
GLUCOSE BLD-MCNC: 96 MG/DL (ref 70–99)
GLUCOSE URINE: NEGATIVE MG/DL
HCG QUALITATIVE: NEGATIVE
HCG(URINE) PREGNANCY TEST: NEGATIVE
HCT VFR BLD CALC: 35.6 % (ref 36–48)
HEMOGLOBIN: 12.3 G/DL (ref 12–16)
KETONES, URINE: ABNORMAL MG/DL
LACTIC ACID: 0.6 MMOL/L (ref 0.4–2)
LEUKOCYTE ESTERASE, URINE: ABNORMAL
LIPASE: 23 U/L (ref 13–60)
LYMPHOCYTES ABSOLUTE: 3 K/UL (ref 1–5.1)
LYMPHOCYTES RELATIVE PERCENT: 31.7 %
MCH RBC QN AUTO: 33.6 PG (ref 26–34)
MCHC RBC AUTO-ENTMCNC: 34.6 G/DL (ref 31–36)
MCV RBC AUTO: 96.9 FL (ref 80–100)
MICROSCOPIC EXAMINATION: YES
MONOCYTES ABSOLUTE: 0.7 K/UL (ref 0–1.3)
MONOCYTES RELATIVE PERCENT: 7 %
MUCUS: ABNORMAL /LPF
NEUTROPHILS ABSOLUTE: 5.3 K/UL (ref 1.7–7.7)
NEUTROPHILS RELATIVE PERCENT: 56.9 %
NITRITE, URINE: NEGATIVE
PDW BLD-RTO: 12.8 % (ref 12.4–15.4)
PH UA: 7 (ref 5–8)
PLATELET # BLD: 279 K/UL (ref 135–450)
PMV BLD AUTO: 8 FL (ref 5–10.5)
POTASSIUM REFLEX MAGNESIUM: 3.7 MMOL/L (ref 3.5–5.1)
PROCALCITONIN: 0.05 NG/ML (ref 0–0.15)
PROTEIN UA: 30 MG/DL
RBC # BLD: 3.67 M/UL (ref 4–5.2)
RBC UA: ABNORMAL /HPF (ref 0–4)
SARS-COV-2, NAAT: NOT DETECTED
SODIUM BLD-SCNC: 135 MMOL/L (ref 136–145)
SPECIFIC GRAVITY UA: 1.02 (ref 1–1.03)
TOTAL PROTEIN: 6.2 G/DL (ref 6.4–8.2)
URINE REFLEX TO CULTURE: YES
URINE TYPE: ABNORMAL
UROBILINOGEN, URINE: 0.2 E.U./DL
WBC # BLD: 9.3 K/UL (ref 4–11)
WBC UA: ABNORMAL /HPF (ref 0–5)

## 2021-10-01 PROCEDURE — 3700000001 HC ADD 15 MINUTES (ANESTHESIA): Performed by: UROLOGY

## 2021-10-01 PROCEDURE — 6370000000 HC RX 637 (ALT 250 FOR IP): Performed by: INTERNAL MEDICINE

## 2021-10-01 PROCEDURE — 2580000003 HC RX 258: Performed by: UROLOGY

## 2021-10-01 PROCEDURE — 6370000000 HC RX 637 (ALT 250 FOR IP): Performed by: UROLOGY

## 2021-10-01 PROCEDURE — 6370000000 HC RX 637 (ALT 250 FOR IP): Performed by: NURSE PRACTITIONER

## 2021-10-01 PROCEDURE — 2580000003 HC RX 258: Performed by: EMERGENCY MEDICINE

## 2021-10-01 PROCEDURE — 83690 ASSAY OF LIPASE: CPT

## 2021-10-01 PROCEDURE — 3600000005 HC SURGERY LEVEL 5 BASE: Performed by: UROLOGY

## 2021-10-01 PROCEDURE — 2709999900 HC NON-CHARGEABLE SUPPLY: Performed by: UROLOGY

## 2021-10-01 PROCEDURE — 87086 URINE CULTURE/COLONY COUNT: CPT

## 2021-10-01 PROCEDURE — 0T778DZ DILATION OF LEFT URETER WITH INTRALUMINAL DEVICE, VIA NATURAL OR ARTIFICIAL OPENING ENDOSCOPIC: ICD-10-PCS | Performed by: UROLOGY

## 2021-10-01 PROCEDURE — C1769 GUIDE WIRE: HCPCS | Performed by: UROLOGY

## 2021-10-01 PROCEDURE — 83605 ASSAY OF LACTIC ACID: CPT

## 2021-10-01 PROCEDURE — 81001 URINALYSIS AUTO W/SCOPE: CPT

## 2021-10-01 PROCEDURE — 84145 PROCALCITONIN (PCT): CPT

## 2021-10-01 PROCEDURE — 71045 X-RAY EXAM CHEST 1 VIEW: CPT

## 2021-10-01 PROCEDURE — 87040 BLOOD CULTURE FOR BACTERIA: CPT

## 2021-10-01 PROCEDURE — 1200000000 HC SEMI PRIVATE

## 2021-10-01 PROCEDURE — 84703 CHORIONIC GONADOTROPIN ASSAY: CPT

## 2021-10-01 PROCEDURE — 74176 CT ABD & PELVIS W/O CONTRAST: CPT

## 2021-10-01 PROCEDURE — 3700000000 HC ANESTHESIA ATTENDED CARE: Performed by: UROLOGY

## 2021-10-01 PROCEDURE — 7100000000 HC PACU RECOVERY - FIRST 15 MIN: Performed by: UROLOGY

## 2021-10-01 PROCEDURE — 3209999900 FLUORO FOR SURGICAL PROCEDURES

## 2021-10-01 PROCEDURE — 2580000003 HC RX 258: Performed by: ANESTHESIOLOGY

## 2021-10-01 PROCEDURE — 6360000002 HC RX W HCPCS: Performed by: NURSE PRACTITIONER

## 2021-10-01 PROCEDURE — 6360000002 HC RX W HCPCS: Performed by: NURSE ANESTHETIST, CERTIFIED REGISTERED

## 2021-10-01 PROCEDURE — 96365 THER/PROPH/DIAG IV INF INIT: CPT

## 2021-10-01 PROCEDURE — 2500000003 HC RX 250 WO HCPCS: Performed by: NURSE ANESTHETIST, CERTIFIED REGISTERED

## 2021-10-01 PROCEDURE — C2617 STENT, NON-COR, TEM W/O DEL: HCPCS | Performed by: UROLOGY

## 2021-10-01 PROCEDURE — 7100000001 HC PACU RECOVERY - ADDTL 15 MIN: Performed by: UROLOGY

## 2021-10-01 PROCEDURE — 3600000015 HC SURGERY LEVEL 5 ADDTL 15MIN: Performed by: UROLOGY

## 2021-10-01 PROCEDURE — 6360000002 HC RX W HCPCS: Performed by: EMERGENCY MEDICINE

## 2021-10-01 PROCEDURE — 96375 TX/PRO/DX INJ NEW DRUG ADDON: CPT

## 2021-10-01 PROCEDURE — 6360000002 HC RX W HCPCS: Performed by: ANESTHESIOLOGY

## 2021-10-01 PROCEDURE — 85025 COMPLETE CBC W/AUTO DIFF WBC: CPT

## 2021-10-01 PROCEDURE — 74018 RADEX ABDOMEN 1 VIEW: CPT

## 2021-10-01 PROCEDURE — 80053 COMPREHEN METABOLIC PANEL: CPT

## 2021-10-01 PROCEDURE — 36415 COLL VENOUS BLD VENIPUNCTURE: CPT

## 2021-10-01 PROCEDURE — 87635 SARS-COV-2 COVID-19 AMP PRB: CPT

## 2021-10-01 DEVICE — URETERAL STENT
Type: IMPLANTABLE DEVICE | Status: FUNCTIONAL
Brand: CONTOUR™

## 2021-10-01 RX ORDER — NICOTINE 21 MG/24HR
1 PATCH, TRANSDERMAL 24 HOURS TRANSDERMAL DAILY
Status: DISCONTINUED | OUTPATIENT
Start: 2021-10-02 | End: 2021-10-02 | Stop reason: HOSPADM

## 2021-10-01 RX ORDER — MAGNESIUM SULFATE IN WATER 40 MG/ML
2000 INJECTION, SOLUTION INTRAVENOUS PRN
Status: DISCONTINUED | OUTPATIENT
Start: 2021-10-01 | End: 2021-10-02 | Stop reason: HOSPADM

## 2021-10-01 RX ORDER — OXYCODONE HYDROCHLORIDE AND ACETAMINOPHEN 5; 325 MG/1; MG/1
1 TABLET ORAL PRN
Status: DISCONTINUED | OUTPATIENT
Start: 2021-10-01 | End: 2021-10-01 | Stop reason: HOSPADM

## 2021-10-01 RX ORDER — LIDOCAINE HYDROCHLORIDE 20 MG/ML
INJECTION, SOLUTION INFILTRATION; PERINEURAL PRN
Status: DISCONTINUED | OUTPATIENT
Start: 2021-10-01 | End: 2021-10-01 | Stop reason: SDUPTHER

## 2021-10-01 RX ORDER — HYDRALAZINE HYDROCHLORIDE 20 MG/ML
5 INJECTION INTRAMUSCULAR; INTRAVENOUS EVERY 10 MIN PRN
Status: DISCONTINUED | OUTPATIENT
Start: 2021-10-01 | End: 2021-10-01 | Stop reason: HOSPADM

## 2021-10-01 RX ORDER — ONDANSETRON 2 MG/ML
4 INJECTION INTRAMUSCULAR; INTRAVENOUS ONCE
Status: COMPLETED | OUTPATIENT
Start: 2021-10-01 | End: 2021-10-01

## 2021-10-01 RX ORDER — QUETIAPINE FUMARATE 50 MG/1
150 TABLET, EXTENDED RELEASE ORAL NIGHTLY
Status: DISCONTINUED | OUTPATIENT
Start: 2021-10-01 | End: 2021-10-01

## 2021-10-01 RX ORDER — 0.9 % SODIUM CHLORIDE 0.9 %
1000 INTRAVENOUS SOLUTION INTRAVENOUS ONCE
Status: DISCONTINUED | OUTPATIENT
Start: 2021-10-01 | End: 2021-10-02 | Stop reason: HOSPADM

## 2021-10-01 RX ORDER — ONDANSETRON 2 MG/ML
INJECTION INTRAMUSCULAR; INTRAVENOUS PRN
Status: DISCONTINUED | OUTPATIENT
Start: 2021-10-01 | End: 2021-10-01 | Stop reason: SDUPTHER

## 2021-10-01 RX ORDER — MORPHINE SULFATE 2 MG/ML
1 INJECTION, SOLUTION INTRAMUSCULAR; INTRAVENOUS EVERY 5 MIN PRN
Status: DISCONTINUED | OUTPATIENT
Start: 2021-10-01 | End: 2021-10-01 | Stop reason: HOSPADM

## 2021-10-01 RX ORDER — QUETIAPINE FUMARATE 50 MG/1
150 TABLET, EXTENDED RELEASE ORAL NIGHTLY
Status: DISCONTINUED | OUTPATIENT
Start: 2021-10-01 | End: 2021-10-02 | Stop reason: HOSPADM

## 2021-10-01 RX ORDER — ONDANSETRON 2 MG/ML
4 INJECTION INTRAMUSCULAR; INTRAVENOUS EVERY 6 HOURS PRN
Status: DISCONTINUED | OUTPATIENT
Start: 2021-10-01 | End: 2021-10-02 | Stop reason: HOSPADM

## 2021-10-01 RX ORDER — MORPHINE SULFATE 2 MG/ML
2 INJECTION, SOLUTION INTRAMUSCULAR; INTRAVENOUS EVERY 4 HOURS PRN
Status: DISCONTINUED | OUTPATIENT
Start: 2021-10-01 | End: 2021-10-02 | Stop reason: HOSPADM

## 2021-10-01 RX ORDER — MEPERIDINE HYDROCHLORIDE 50 MG/ML
12.5 INJECTION INTRAMUSCULAR; INTRAVENOUS; SUBCUTANEOUS EVERY 5 MIN PRN
Status: DISCONTINUED | OUTPATIENT
Start: 2021-10-01 | End: 2021-10-01 | Stop reason: HOSPADM

## 2021-10-01 RX ORDER — OXYCODONE HYDROCHLORIDE AND ACETAMINOPHEN 5; 325 MG/1; MG/1
2 TABLET ORAL PRN
Status: DISCONTINUED | OUTPATIENT
Start: 2021-10-01 | End: 2021-10-01 | Stop reason: HOSPADM

## 2021-10-01 RX ORDER — OXYBUTYNIN CHLORIDE 5 MG/1
5 TABLET ORAL 3 TIMES DAILY
Status: DISCONTINUED | OUTPATIENT
Start: 2021-10-01 | End: 2021-10-02 | Stop reason: HOSPADM

## 2021-10-01 RX ORDER — 0.9 % SODIUM CHLORIDE 0.9 %
1000 INTRAVENOUS SOLUTION INTRAVENOUS ONCE
Status: COMPLETED | OUTPATIENT
Start: 2021-10-01 | End: 2021-10-01

## 2021-10-01 RX ORDER — ONDANSETRON 2 MG/ML
4 INJECTION INTRAMUSCULAR; INTRAVENOUS PRN
Status: DISCONTINUED | OUTPATIENT
Start: 2021-10-01 | End: 2021-10-01 | Stop reason: HOSPADM

## 2021-10-01 RX ORDER — OXYCODONE HYDROCHLORIDE 5 MG/1
5 TABLET ORAL EVERY 4 HOURS PRN
Status: DISCONTINUED | OUTPATIENT
Start: 2021-10-01 | End: 2021-10-02 | Stop reason: HOSPADM

## 2021-10-01 RX ORDER — OXYBUTYNIN CHLORIDE 5 MG/1
5 TABLET ORAL 3 TIMES DAILY
COMMUNITY
End: 2022-03-28

## 2021-10-01 RX ORDER — SODIUM CHLORIDE 9 MG/ML
1000 INJECTION, SOLUTION INTRAVENOUS CONTINUOUS
Status: DISCONTINUED | OUTPATIENT
Start: 2021-10-01 | End: 2021-10-02 | Stop reason: HOSPADM

## 2021-10-01 RX ORDER — SODIUM CHLORIDE 9 MG/ML
25 INJECTION, SOLUTION INTRAVENOUS PRN
Status: DISCONTINUED | OUTPATIENT
Start: 2021-10-01 | End: 2021-10-02 | Stop reason: HOSPADM

## 2021-10-01 RX ORDER — PROPOFOL 10 MG/ML
INJECTION, EMULSION INTRAVENOUS PRN
Status: DISCONTINUED | OUTPATIENT
Start: 2021-10-01 | End: 2021-10-01 | Stop reason: SDUPTHER

## 2021-10-01 RX ORDER — PROMETHAZINE HYDROCHLORIDE 25 MG/1
12.5 TABLET ORAL EVERY 6 HOURS PRN
Status: DISCONTINUED | OUTPATIENT
Start: 2021-10-01 | End: 2021-10-02 | Stop reason: HOSPADM

## 2021-10-01 RX ORDER — LISDEXAMFETAMINE DIMESYLATE 40 MG/1
40 CAPSULE ORAL DAILY
COMMUNITY

## 2021-10-01 RX ORDER — MORPHINE SULFATE 2 MG/ML
2 INJECTION, SOLUTION INTRAMUSCULAR; INTRAVENOUS EVERY 5 MIN PRN
Status: DISCONTINUED | OUTPATIENT
Start: 2021-10-01 | End: 2021-10-01 | Stop reason: HOSPADM

## 2021-10-01 RX ORDER — MAGNESIUM HYDROXIDE 1200 MG/15ML
LIQUID ORAL CONTINUOUS PRN
Status: COMPLETED | OUTPATIENT
Start: 2021-10-01 | End: 2021-10-01

## 2021-10-01 RX ORDER — KETOROLAC TROMETHAMINE 30 MG/ML
30 INJECTION, SOLUTION INTRAMUSCULAR; INTRAVENOUS ONCE
Status: COMPLETED | OUTPATIENT
Start: 2021-10-01 | End: 2021-10-01

## 2021-10-01 RX ORDER — DEXAMETHASONE SODIUM PHOSPHATE 4 MG/ML
INJECTION, SOLUTION INTRA-ARTICULAR; INTRALESIONAL; INTRAMUSCULAR; INTRAVENOUS; SOFT TISSUE PRN
Status: DISCONTINUED | OUTPATIENT
Start: 2021-10-01 | End: 2021-10-01 | Stop reason: SDUPTHER

## 2021-10-01 RX ORDER — PROMETHAZINE HYDROCHLORIDE 25 MG/ML
6.25 INJECTION, SOLUTION INTRAMUSCULAR; INTRAVENOUS
Status: DISCONTINUED | OUTPATIENT
Start: 2021-10-01 | End: 2021-10-01 | Stop reason: HOSPADM

## 2021-10-01 RX ORDER — OXYCODONE HYDROCHLORIDE 5 MG/1
10 TABLET ORAL EVERY 4 HOURS PRN
Status: DISCONTINUED | OUTPATIENT
Start: 2021-10-01 | End: 2021-10-02 | Stop reason: HOSPADM

## 2021-10-01 RX ORDER — LABETALOL HYDROCHLORIDE 5 MG/ML
5 INJECTION, SOLUTION INTRAVENOUS EVERY 10 MIN PRN
Status: DISCONTINUED | OUTPATIENT
Start: 2021-10-01 | End: 2021-10-01 | Stop reason: HOSPADM

## 2021-10-01 RX ORDER — DIPHENHYDRAMINE HYDROCHLORIDE 50 MG/ML
12.5 INJECTION INTRAMUSCULAR; INTRAVENOUS
Status: DISCONTINUED | OUTPATIENT
Start: 2021-10-01 | End: 2021-10-01 | Stop reason: HOSPADM

## 2021-10-01 RX ORDER — ACETAMINOPHEN 325 MG/1
650 TABLET ORAL EVERY 6 HOURS PRN
Status: DISCONTINUED | OUTPATIENT
Start: 2021-10-01 | End: 2021-10-02 | Stop reason: HOSPADM

## 2021-10-01 RX ORDER — SODIUM CHLORIDE 0.9 % (FLUSH) 0.9 %
10 SYRINGE (ML) INJECTION PRN
Status: DISCONTINUED | OUTPATIENT
Start: 2021-10-01 | End: 2021-10-02 | Stop reason: HOSPADM

## 2021-10-01 RX ORDER — SODIUM CHLORIDE 0.9 % (FLUSH) 0.9 %
10 SYRINGE (ML) INJECTION EVERY 12 HOURS SCHEDULED
Status: DISCONTINUED | OUTPATIENT
Start: 2021-10-01 | End: 2021-10-02 | Stop reason: HOSPADM

## 2021-10-01 RX ORDER — ACETAMINOPHEN 650 MG/1
650 SUPPOSITORY RECTAL EVERY 6 HOURS PRN
Status: DISCONTINUED | OUTPATIENT
Start: 2021-10-01 | End: 2021-10-02 | Stop reason: HOSPADM

## 2021-10-01 RX ORDER — POTASSIUM CHLORIDE 7.45 MG/ML
10 INJECTION INTRAVENOUS PRN
Status: DISCONTINUED | OUTPATIENT
Start: 2021-10-01 | End: 2021-10-02 | Stop reason: HOSPADM

## 2021-10-01 RX ORDER — KETOROLAC TROMETHAMINE 10 MG/1
10 TABLET, FILM COATED ORAL DAILY
COMMUNITY
End: 2022-03-28

## 2021-10-01 RX ADMIN — KETOROLAC TROMETHAMINE 30 MG: 30 INJECTION, SOLUTION INTRAMUSCULAR at 02:33

## 2021-10-01 RX ADMIN — ACETAMINOPHEN 650 MG: 325 TABLET ORAL at 08:40

## 2021-10-01 RX ADMIN — OXYBUTYNIN CHLORIDE 5 MG: 5 TABLET ORAL at 21:36

## 2021-10-01 RX ADMIN — OXYCODONE 10 MG: 5 TABLET ORAL at 16:37

## 2021-10-01 RX ADMIN — SODIUM CHLORIDE 1000 ML: 9 INJECTION, SOLUTION INTRAVENOUS at 05:52

## 2021-10-01 RX ADMIN — DEXAMETHASONE SODIUM PHOSPHATE 8 MG: 4 INJECTION, SOLUTION INTRAMUSCULAR; INTRAVENOUS at 14:04

## 2021-10-01 RX ADMIN — OXYCODONE 5 MG: 5 TABLET ORAL at 23:37

## 2021-10-01 RX ADMIN — SODIUM CHLORIDE 1000 ML: 9 INJECTION, SOLUTION INTRAVENOUS at 17:48

## 2021-10-01 RX ADMIN — ONDANSETRON 4 MG: 2 INJECTION INTRAMUSCULAR; INTRAVENOUS at 14:04

## 2021-10-01 RX ADMIN — SODIUM CHLORIDE, POTASSIUM CHLORIDE, SODIUM LACTATE AND CALCIUM CHLORIDE: 600; 310; 30; 20 INJECTION, SOLUTION INTRAVENOUS at 13:49

## 2021-10-01 RX ADMIN — QUETIAPINE FUMARATE 150 MG: 50 TABLET, EXTENDED RELEASE ORAL at 21:35

## 2021-10-01 RX ADMIN — ONDANSETRON 4 MG: 2 INJECTION INTRAMUSCULAR; INTRAVENOUS at 02:33

## 2021-10-01 RX ADMIN — MORPHINE SULFATE 2 MG: 2 INJECTION, SOLUTION INTRAMUSCULAR; INTRAVENOUS at 21:36

## 2021-10-01 RX ADMIN — PROPOFOL 300 MG: 10 INJECTION, EMULSION INTRAVENOUS at 13:55

## 2021-10-01 RX ADMIN — CEFEPIME HYDROCHLORIDE 2000 MG: 2 INJECTION, POWDER, FOR SOLUTION INTRAVENOUS at 05:30

## 2021-10-01 RX ADMIN — LIDOCAINE HYDROCHLORIDE 60 MG: 20 INJECTION, SOLUTION INFILTRATION; PERINEURAL at 13:55

## 2021-10-01 RX ADMIN — HYDROMORPHONE HYDROCHLORIDE 0.25 MG: 1 INJECTION, SOLUTION INTRAMUSCULAR; INTRAVENOUS; SUBCUTANEOUS at 15:22

## 2021-10-01 RX ADMIN — SODIUM CHLORIDE 1000 ML: 9 INJECTION, SOLUTION INTRAVENOUS at 02:32

## 2021-10-01 ASSESSMENT — PULMONARY FUNCTION TESTS
PIF_VALUE: 0
PIF_VALUE: 2
PIF_VALUE: 1
PIF_VALUE: 12
PIF_VALUE: 13
PIF_VALUE: 12
PIF_VALUE: 2
PIF_VALUE: 12
PIF_VALUE: 2
PIF_VALUE: 2
PIF_VALUE: 5
PIF_VALUE: 4
PIF_VALUE: 10
PIF_VALUE: 14
PIF_VALUE: 13
PIF_VALUE: 10
PIF_VALUE: 1
PIF_VALUE: 0
PIF_VALUE: 13
PIF_VALUE: 0
PIF_VALUE: 12
PIF_VALUE: 0
PIF_VALUE: 12

## 2021-10-01 ASSESSMENT — PAIN DESCRIPTION - PROGRESSION
CLINICAL_PROGRESSION: GRADUALLY IMPROVING
CLINICAL_PROGRESSION: GRADUALLY IMPROVING
CLINICAL_PROGRESSION: OTHER (COMMENT)
CLINICAL_PROGRESSION: GRADUALLY IMPROVING

## 2021-10-01 ASSESSMENT — PAIN SCALES - GENERAL
PAINLEVEL_OUTOF10: 4
PAINLEVEL_OUTOF10: 10
PAINLEVEL_OUTOF10: 0
PAINLEVEL_OUTOF10: 4
PAINLEVEL_OUTOF10: 6
PAINLEVEL_OUTOF10: 3
PAINLEVEL_OUTOF10: 10
PAINLEVEL_OUTOF10: 5
PAINLEVEL_OUTOF10: 5
PAINLEVEL_OUTOF10: 7
PAINLEVEL_OUTOF10: 7
PAINLEVEL_OUTOF10: 10

## 2021-10-01 ASSESSMENT — PAIN DESCRIPTION - PAIN TYPE
TYPE: OTHER (COMMENT)
TYPE: ACUTE PAIN

## 2021-10-01 ASSESSMENT — PAIN DESCRIPTION - LOCATION
LOCATION: OTHER (COMMENT)
LOCATION: FLANK

## 2021-10-01 ASSESSMENT — PAIN DESCRIPTION - ONSET
ONSET: OTHER (COMMENT)
ONSET: ON-GOING

## 2021-10-01 ASSESSMENT — PAIN DESCRIPTION - FREQUENCY
FREQUENCY: OTHER (COMMENT)
FREQUENCY: CONTINUOUS

## 2021-10-01 ASSESSMENT — PAIN DESCRIPTION - ORIENTATION
ORIENTATION: LEFT
ORIENTATION: OTHER (COMMENT)
ORIENTATION: RIGHT;LEFT
ORIENTATION: LEFT

## 2021-10-01 ASSESSMENT — PAIN DESCRIPTION - DESCRIPTORS
DESCRIPTORS: ACHING
DESCRIPTORS: OTHER (COMMENT)
DESCRIPTORS: DISCOMFORT;ACHING

## 2021-10-01 NOTE — ANESTHESIA POSTPROCEDURE EVALUATION
Department of Anesthesiology  Postprocedure Note    Patient: Ines Felty  MRN: [id number]  YOB: 1980  Date of evaluation: 10/1/2021  Time:  4:12 PM     Procedure Summary     Date: 10/01/21 [facility]    Anesthesia Start: 6191 Anesthesia Stop: 1419    Procedure: CYSTOSCOPY LEFT  URETERAL STENT INSERTION (Left Bladder) Diagnosis: (URETERAL STONE)    Surgeons: Jimena Adkins MD Responsible Provider: Tato Esparza MD    Anesthesia Type: general ASA Status: 2          Anesthesia Type: general    Sai Phase I: Sai Score: 9    Sai Phase II:      Last vitals: Reviewed and per EMR flowsheets.        Anesthesia Post Evaluation    Patient location during evaluation: PACU  Patient participation: complete - patient participated  Level of consciousness: awake and alert  Pain score: 0  Airway patency: patent  Nausea & Vomiting: no nausea and no vomiting  Complications: no  Cardiovascular status: blood pressure returned to baseline  Respiratory status: acceptable  Hydration status: stable

## 2021-10-01 NOTE — H&P
Hospital Medicine History & Physical      PCP: SANTIAGO Cortes    Date of Admission: 10/1/2021    Date of Service: Pt seen/examined on 10/1/2021 and Admitted to Inpatient with expected LOS greater than two midnights due to medical therapy. Placed in Observation. Chief Complaint:  Ureterolithiasis s/p ureter stent removal    History Of Present Illness:   Tona Kapadia, 35year old  female, smoker, with past medical history of migraine, Kidney stones, CKD stage 3, bipolar disorder, tubal ligation, hx of substance abuse currently on suboxone who presented to the ED on 9/30 complaining of left flank pain. On 9/20/21, patient was seen by urologist Dr. Edda Donis at [facility] for cystoscopy, left ureteroscopy, retrograde pyelogram, laser dissection of kidney and left stent insertion. Patient tolerated the procedure well. Yesterday on 9/30/21 , patient was seen again by Dr. Edda Donis for left ureteral stent removal.   After stent removal yesterday night, patient presented to ED reporting sudden worsening of left flank pain. ED course -patient hemodynamically stable upon arrival to ED but subsequently developed borderline low blood pressure requiring IV fluid resuscitation. Labs unremarkable. CT abdomen pelvis showed 5 mm obstructing calculus in the mid left ureter causing moderate to severe hydroureter and hydronephrosis. Tiny right calculi but no right-sided hydronephrosis or hydroureter. Multiple large calcifications remain in the left upper pole. Urology team was consulted with plan for cystoscopy and stent placement. Hospitalist consulted for admission for further evaluation and management. Today, patient was seen and examined at bedside in PACU  while waiting for cystoscopy left ureteral stent placement by urology team. Patient was still complaining of worsening left flank pain. Pain is severe, 10 out of 10 in severity. Radiating from left back to left flank and LUQ.  Patient denies exacerbating or relieving factors. Also reports low grade fever and chills prior to coming to ED. Patient said that her urologist at [facility] gave her one dose of antibiotic after her procedure. Today, patient denies chest pain, shortness of breath, nausea, vomiting, fever, chills, diarrhea, gross hematuria. Patient denies other constitutional symptoms. Past Medical History:        Diagnosis Date    Acute ischemic colitis ([address] 10/20/15    Anesthesia complication     wakes from anesthesia with migraine    Asthma     Had real bad as child and then grew out of it and  then got pregnant it started acting up again.     Bipolar 1 disorder ([address]     Bipolar affective disorder, current episode depressed ([address] 5/28/2015    Depression     Kidney stones     has had multiple kidney stones     Migraine     Nodule of left lung     pt has non calcified nodules on both lungs    Nodule of right lung     Opiate addiction ([address]     Past hx    Stage 3 chronic kidney disease ([address]     Wears contact lenses        Past Surgical History:          Procedure Laterality Date    COLONOSCOPY  10/20/15    possible ischemic colitis    CYSTOSCOPY Left 08/13/2020    left uretroscopy, with stone extraction    CYSTOSCOPY INSERTION / REMOVAL STENT / STONE Left 8/13/2020    CYSTOSCOPY, URETEROSCOPY, HOLMIUM LASER LITHOTRIPSY, STONE EXTRACTION performed by Rosita Ibarra MD at [address]  10/11/2016    Hysteroscopy, Novasure Ablation    LITHOTRIPSY      x 7    NEPHROLITHOTOMY Left 9/17/2020    LEFT PERCUTANEOUS NEPHROLITHOTOMY, CYSTOSCOPY WITH HOLMIUM LASER LITHOTRIPSY performed by Stuart Veras MD at [facility] NEPHROSTOMY Left 9/17/2020    NEPHROSTOMY PERCUTANEOUS TUBE INSERTION performed by Stuart Veras MD at [address] [place] [address]  2007    TUBAL LIGATION  2001    URETER STENT PLACEMENT      X 2 then removed    URETER STENT PLACEMENT         Medications Prior to Admission:      Prior to Admission medications    Medication Sig Start Date End Date Taking? Authorizing Provider   ibuprofen (IBU) 600 MG tablet Take 1 tablet by mouth every 6 hours as needed for Pain 7/3/21   Omar Pimentel PA-C   acetaminophen (APAP EXTRA STRENGTH) 500 MG tablet Take 2 tablets by mouth every 6 hours as needed for Pain 7/3/21   Omar Pimentel PA-C   ondansetron (ZOFRAN ODT) 4 MG disintegrating tablet Take 1 tablet by mouth every 8 hours as needed for Nausea Let dissolve in mouth. 7/3/21   Omar Pimentel PA-C   VRAYLAR 1.5 MG capsule Take 1 capsule by mouth daily 12/28/20   Historical Provider, MD   omeprazole (PRILOSEC) 40 MG delayed release capsule Take 1 capsule by mouth every morning (before breakfast) 2/26/21   SANTIAGO Lopez   tamsulosin [facility]) 0.4 MG capsule Take 1 capsule by mouth daily for 5 doses 9/20/20 9/25/20  [place] Smoker, APRN - CNP   diphenhydrAMINE (BENADRYL) 50 MG capsule Take 50 mg by mouth nightly as needed for Sleep    Historical Provider, MD   albuterol sulfate  (90 Base) MCG/ACT inhaler Inhale 2 puffs into the lungs every 4 hours as needed for Wheezing May Sub as needed per insurance. 9/2/20   SANTIAGO Lopez   buprenorphine-naloxone (SUBOXONE) 8-2 MG SUBL SL tablet Place 1 tablet under the tongue daily. Pt takes 12 mg daily    Historical Provider, MD   QUEtiapine (SEROQUEL XR) 150 MG TB24 extended release tablet Take 150 mg by mouth nightly    Historical Provider, MD       Allergies:  Patient has no known allergies. Social History:      TOBACCO:   reports that she has been smoking cigarettes. She started smoking about 26 years ago. She has a 21.00 pack-year smoking history. She has never used smokeless tobacco.  ETOH:   reports previous alcohol use.   E-Cigarettes/Vaping Use     Questions Responses    E-Cigarette/Vaping Use Never User    Start Date     Passive Exposure     Quit Date     Counseling Given     Comments Unknown            Family History:    Reviewed in detail and negative for DM, CAD, Cancer, CVA. Positive as follows:        Problem Relation Age of Onset    High Blood Pressure Father     Kidney Disease Father     Cancer Maternal Grandmother     Cancer Maternal Grandfather     Kidney Disease Maternal Grandfather     Cancer Paternal Grandmother     Cancer Paternal Grandfather     Cancer Other 3        ALL    Diabetes Maternal Uncle     COPD Mother     Depression Sister     Ovarian Cancer Sister        REVIEW OF SYSTEMS COMPLETED:   Pertinent positives as noted in the HPI. All other systems reviewed and negative. PHYSICAL EXAM PERFORMED:    BP 88/70   Pulse 70   Temp 97.8 °F (36.6 °C) (Oral)   Resp 17   Ht 5' 1\" (1.549 m)   Wt 170 lb (77.1 kg)   SpO2 90%   BMI 32.12 kg/m²     General appearance:  No apparent distress, appears stated age and cooperative. HEENT:  Normal cephalic, atraumatic without obvious deformity. Pupils equal, round, and reactive to light. Extra ocular muscles intact. Conjunctivae/corneas clear. Neck: Supple, with full range of motion. No jugular venous distention. Trachea midline. Respiratory:  Normal respiratory effort. Clear to auscultation, bilaterally without Rales/Wheezes/Rhonchi. Cardiovascular:  Regular rate and rhythm with normal S1/S2 without murmurs, rubs or gallops. Abdomen: Soft, non-tender, non-distended with normal bowel sounds. Musculoskeletal:  No clubbing, cyanosis or edema bilaterally. Full range of motion without deformity. Skin: Skin color, texture, turgor normal.  No rashes or lesions. Neurologic:  Neurovascularly intact without any focal sensory/motor deficits.  Cranial nerves: II-XII intact, grossly non-focal.  Psychiatric:  Alert and oriented, thought content appropriate, normal insight  Capillary Refill: Brisk,3 seconds, normal  Peripheral Pulses: +2 palpable, equal bilaterally     Labs:     Recent Labs     10/01/21  0340   WBC 9.3   HGB 12.3   HCT 35.6*    Recent Labs     10/01/21  0340   *   K 3.7      CO2 24   BUN 19   CREATININE 1.4*   CALCIUM 10.9*     Recent Labs     10/01/21  0340   AST 19   ALT 23   BILITOT 0.7   ALKPHOS 96     Urinalysis:    Lab Results   Component Value Date    NITRU Negative 10/01/2021    WBCUA  10/01/2021    BACTERIA 4+ 10/01/2021    RBCUA  10/01/2021    BLOODU LARGE 10/01/2021    SPECGRAV 1.025 10/01/2021    GLUCOSEU Negative 10/01/2021    GLUCOSEU Negative 07/23/2010       Radiology:     EKG(4/13/2021): I have reviewed the EKG with the following interpretation: Normal sinus rhythmLeft posterior fascicular blockNonspecific T wave abnormality     CXR(10/1/2021)  XR CHEST PORTABLE   Final Result   Hazy appearance at the bases is likely artifactual with the positioning. The   remaining lungs are clear and no pneumothorax. CT ABDOMEN and PELVIS(10/1/2021):   CT ABDOMEN PELVIS WO CONTRAST Additional Contrast? None   Final Result   A 5 mm obstructing calculus in the mid left ureter causing moderate to severe   hydroureter and hydronephrosis. Edema of the left kidney and Mary ureteral   fat may be due to the severity of the obstruction but underlying infection is   not excluded. Tiny right calculi but no right-sided hydronephrosis or hydroureter. Multiple large calcifications remain in the left upper pole. ASSESSMENT/PLAN:    Active Hospital Problems    Diagnosis Date Noted    Nephrolithiasis [N20.0] 08/12/2020     Elias Buck, 35year old  female, smoker, with past medical history of migraine, Kidney stones, CKD stage 3, bipolar disorder, tubal ligation, hx of substance abuse currently on suboxone who presented to the ED on 9/30 complaining of left flank pain. Acute left-sided flank pain secondary to left obstructing calculus in mid ureter causing moderate to severe hydronephrosis. -Patient with history of  ureterolithiasis s/p ureter stent removal on 9/30/2021.   Follows with  urology Dr. Tiffany Armstrong  -CT abdomen pelvis on admission with 5 mm obstructing left mid ureteral stone causing moderate to severe hydroureter and hydronephrosis. - Urologist consulted from ED - S/p cystoscopy and left ureteral stent insertion on 10/1/2021  - urologist recommended follow-up ureteroscopy through her primary urologist in 1-2 weeks  - Continue IVF and adequate hydration  - Cefepime 2000 mg IVPB ; follow urine cultures and titrate antibiotics  - will continue monitoring    - repeat CBC and BMP tomorrow    DONATO  (POA)  -likely prerenal etiology  Creatinine 1.4 (10/1/2021); baseline within normal limits earlier this year.  -Continue IVF and PO hydration and monitor BMP in am    Pain due to above  - Toradol injection 30 mg once  - Tylenol 650 mg q6h    GI PPX: Zofran injection 4 mg once     DVT Prophylaxis: not indicated at this time  Diet: Diet NPO Exceptions are: Ice Chips, Sips of Water with Meds  Code Status: Full Code    PT/OT Eval Status: activity as tolerated     Dispo - Possible d/c to home tomorrow per urologist       DO Ervin   10/1/2021      Thank you SANTIAGO Lopez for the opportunity to be involved in this patient's care. If you have any questions or concerns please feel free to contact me at [phone]. Addendum to Resident H& P note:  Pt seen,examined and evaluated with resident and discussed regarding POC . I have reviewed the current history, physical findings, labs and assessment and plan and agree with note as documented by resident DO ( Dr. Jaina Hill )    Patient seen and examined with her  at bedside in PACU evaluating for her urological procedure. Patient offers no new complaints at this time. Pain control adequate. Continue current care plan as detailed above. Will follow.     Denette Curling, MD  Hospitalist Physician

## 2021-10-01 NOTE — ED PROVIDER NOTES
CHIEF COMPLAINT  Post-op Problem (ureter stent removed today pt states she is in extreme pain. Pt state she is a recovering addict and can only us IV meds, no pills, pt did not call Urologist)      HISTORY OF PRESENT ILLNESS  Janelle Oliveros is a 35 y.o. female with a history of migraines, chronic kidney disease, bipolar disorder, tubal ligation, prior substance abuse currently on Suboxone who presents to the ED complaining of left flank pain. Patient states she was recently seen by Dr Roland Wilder at [facility] for ureterolithiasis. She had her left ureteral stent removed yesterday. Reports sudden worsening of left flank pain this evening. Patient states pain is severe, radiates from the left back to the left flank and left upper quadrant. No exacerbating or relieving factors. Denies measured fever, chest pain, dyspnea, vomiting, diarrhea, gross hematuria. No other complaints, modifying factors or associated symptoms. I have reviewed the following from the nursing documentation. Past Medical History:   Diagnosis Date    Acute ischemic colitis ([address] 10/20/15    Anesthesia complication     wakes from anesthesia with migraine    Asthma     Had real bad as child and then grew out of it and  then got pregnant it started acting up again.     Bipolar 1 disorder ([address]     Bipolar affective disorder, current episode depressed ([address] 5/28/2015    Depression     Kidney stones     has had multiple kidney stones     Migraine     Nodule of left lung     pt has non calcified nodules on both lungs    Nodule of right lung     Opiate addiction ([address]     Past hx    Stage 3 chronic kidney disease ([address]     Wears contact lenses      Past Surgical History:   Procedure Laterality Date    COLONOSCOPY  10/20/15    possible ischemic colitis    CYSTOSCOPY Left 08/13/2020    left uretroscopy, with stone extraction    CYSTOSCOPY INSERTION / REMOVAL STENT / STONE Left 8/13/2020    CYSTOSCOPY, URETEROSCOPY, HOLMIUM LASER LITHOTRIPSY, STONE EXTRACTION performed by Rosita Ibarra MD at [address]  10/11/2016    Hysteroscopy, Novasure Ablation    LITHOTRIPSY      x 7    NEPHROLITHOTOMY Left 9/17/2020    LEFT PERCUTANEOUS NEPHROLITHOTOMY, CYSTOSCOPY WITH HOLMIUM LASER LITHOTRIPSY performed by Stuart Veras MD at [facility] NEPHROSTOMY Left 9/17/2020    NEPHROSTOMY PERCUTANEOUS TUBE INSERTION performed by Stuart Veras MD at [address] [place] [address]    TUBAL LIGATION  2001    [address]      X 2 then removed    URETER STENT PLACEMENT       Family History   Problem Relation Age of Onset    High Blood Pressure Father     Kidney Disease Father     Cancer Maternal Grandmother     Cancer Maternal Grandfather     Kidney Disease Maternal Grandfather     Cancer Paternal Grandmother     Cancer Paternal Grandfather     Cancer Other 3        ALL    Diabetes Maternal Uncle     COPD Mother     Depression Sister     Ovarian Cancer Sister      Social History     Socioeconomic History    Marital status: Legally      Spouse name: Jg Flynn Number of children: 3    Years of education: 15    Highest education level: Not on file   Occupational History    Occupation: unemployed   Tobacco Use    Smoking status: Current Every Day Smoker     Packs/day: 1.00     Years: 21.00     Pack years: 21.00     Types: Cigarettes     Start date: 4/15/1995    Smokeless tobacco: Never Used   Vaping Use    Vaping Use: Never used   Substance and Sexual Activity    Alcohol use: Not Currently     Comment: quit 2 months ago.      Drug use: Yes     Types: Marijuana     Comment: occas    Sexual activity: Yes     Partners: Male   Other Topics Concern    Not on file   Social History Narrative    Not on file     Social Determinants of Health     Financial Resource Strain: High Risk    Difficulty of Paying Living Expenses: Hard   Food Insecurity: Food Insecurity Present    Worried About Running Out of Food in the Last Year: Sometimes true    Shayna of Food in the Last Year: Sometimes true   Transportation Needs: No Transportation Needs    Lack of Transportation (Medical): No    Lack of Transportation (Non-Medical): No   Physical Activity:     Days of Exercise per Week:     Minutes of Exercise per Session:    Stress:     Feeling of Stress :    Social Connections:     Frequency of Communication with Friends and Family:     Frequency of Social Gatherings with Friends and Family:     Attends Mosque Services:     Active Member of Clubs or Organizations:     Attends Club or Organization Meetings:     Marital Status:    Intimate Partner Violence:     Fear of Current or Ex-Partner:     Emotionally Abused:     Physically Abused:     Sexually Abused:      Current Facility-Administered Medications   Medication Dose Route Frequency Provider Last Rate Last Admin    0.9 % sodium chloride infusion  1,000 mL IntraVENous Continuous Maddie Campbell  mL/hr at 10/01/21 0232 1,000 mL at 10/01/21 0232    cefepime (MAXIPIME) 2000 mg IVPB minibag  2,000 mg IntraVENous Once Maddie Campbell  mL/hr at 10/01/21 0530 2,000 mg at 10/01/21 0530    0.9 % sodium chloride bolus  1,000 mL IntraVENous Once Maddie Campbell MD 1,000 mL/hr at 10/01/21 0552 1,000 mL at 10/01/21 2933     Current Outpatient Medications   Medication Sig Dispense Refill    ibuprofen (IBU) 600 MG tablet Take 1 tablet by mouth every 6 hours as needed for Pain 30 tablet 0    acetaminophen (APAP EXTRA STRENGTH) 500 MG tablet Take 2 tablets by mouth every 6 hours as needed for Pain 40 tablet 0    ondansetron (ZOFRAN ODT) 4 MG disintegrating tablet Take 1 tablet by mouth every 8 hours as needed for Nausea Let dissolve in mouth.  10 tablet 0    VRAYLAR 1.5 MG capsule Take 1 capsule by mouth daily      omeprazole (PRILOSEC) 40 MG delayed release capsule Take 1 capsule by mouth every morning (before breakfast) 90 capsule 1    tamsulosin (FLOMAX) 0.4 MG capsule Take 1 capsule by mouth daily for 5 doses 5 capsule 0    diphenhydrAMINE (BENADRYL) 50 MG capsule Take 50 mg by mouth nightly as needed for Sleep      albuterol sulfate  (90 Base) MCG/ACT inhaler Inhale 2 puffs into the lungs every 4 hours as needed for Wheezing May Sub as needed per insurance. 1 Inhaler 5    buprenorphine-naloxone (SUBOXONE) 8-2 MG SUBL SL tablet Place 1 tablet under the tongue daily. Pt takes 12 mg daily      QUEtiapine (SEROQUEL XR) 150 MG TB24 extended release tablet Take 150 mg by mouth nightly       Facility-Administered Medications Ordered in Other Encounters   Medication Dose Route Frequency Provider Last Rate Last Admin    lactated ringers infusion   IntraVENous Continuous rEvin Bustos MD   New Bag at 09/17/20 1424    sodium chloride flush 0.9 % injection 10 mL  10 mL IntraVENous 2 times per day Ervin Bustos MD        sodium chloride flush 0.9 % injection 10 mL  10 mL IntraVENous PRN Ervin Bustos MD         No Known Allergies    REVIEW OF SYSTEMS  10 systems reviewed, pertinent positives per HPI otherwise noted to be negative. PHYSICAL EXAM  BP (!) 90/59   Pulse 63   Temp 97.9 °F (36.6 °C) (Oral)   Resp 16   Ht 5' 1\" (1.549 m)   Wt 170 lb (77.1 kg)   SpO2 98%   BMI 32.12 kg/m²   GENERAL APPEARANCE: Awake and alert. Cooperative. Appears uncomfortable, ill but not acutely toxic. HEAD: Normocephalic. Atraumatic. EYES: PERRL. EOM's grossly intact. ENT: Mucous membranes are moist.   NECK: Supple, trachea midline. HEART: RRR. Normal S1, S2. No murmurs, rubs or gallops. LUNGS: Respirations unlabored. CTAB. Good air exchange. No wheezes, rales, or rhonchi. Speaking comfortably in full sentences. ABDOMEN: Soft. Non-distended. Moderate left lateral abdominal tenderness to palpation with CVA tenderness to percussion. No guarding or rebound.  Normal Bowel sounds. EXTREMITIES: No peripheral edema. MAEE. No acute deformities. SKIN: Warm and dry. No acute rashes. NEUROLOGICAL: Alert and oriented X 3. CN II-XII intact. No gross facial drooping. Strength 5/5, sensation intact. No pronator drift. Normal coordination. Gait normal.   PSYCHIATRIC: Normal mood and affect. LABS  I have reviewed all labs for this visit.    Results for orders placed or performed during the hospital encounter of 10/01/21   CBC Auto Differential   Result Value Ref Range    WBC 9.3 4.0 - 11.0 K/uL    RBC 3.67 (L) 4.00 - 5.20 M/uL    Hemoglobin 12.3 12.0 - 16.0 g/dL    Hematocrit 35.6 (L) 36.0 - 48.0 %    MCV 96.9 80.0 - 100.0 fL    MCH 33.6 26.0 - 34.0 pg    MCHC 34.6 31.0 - 36.0 g/dL    RDW 12.8 12.4 - 15.4 %    Platelets 415 431 - 280 K/uL    MPV 8.0 5.0 - 10.5 fL    Neutrophils % 56.9 %    Lymphocytes % 31.7 %    Monocytes % 7.0 %    Eosinophils % 3.1 %    Basophils % 1.3 %    Neutrophils Absolute 5.3 1.7 - 7.7 K/uL    Lymphocytes Absolute 3.0 1.0 - 5.1 K/uL    Monocytes Absolute 0.7 0.0 - 1.3 K/uL    Eosinophils Absolute 0.3 0.0 - 0.6 K/uL    Basophils Absolute 0.1 0.0 - 0.2 K/uL   Comprehensive Metabolic Panel w/ Reflex to MG   Result Value Ref Range    Sodium 135 (L) 136 - 145 mmol/L    Potassium reflex Magnesium 3.7 3.5 - 5.1 mmol/L    Chloride 101 99 - 110 mmol/L    CO2 24 21 - 32 mmol/L    Anion Gap 10 3 - 16    Glucose 96 70 - 99 mg/dL    BUN 19 7 - 20 mg/dL    CREATININE 1.4 (H) 0.6 - 1.1 mg/dL    GFR Non- 41 (A) >60    GFR  50 (A) >60    Calcium 10.9 (H) 8.3 - 10.6 mg/dL    Total Protein 6.2 (L) 6.4 - 8.2 g/dL    Albumin 3.5 3.4 - 5.0 g/dL    Albumin/Globulin Ratio 1.3 1.1 - 2.2    Total Bilirubin 0.7 0.0 - 1.0 mg/dL    Alkaline Phosphatase 96 40 - 129 U/L    ALT 23 10 - 40 U/L    AST 19 15 - 37 U/L    Globulin 2.7 g/dL   Lipase   Result Value Ref Range    Lipase 23.0 13.0 - 60.0 U/L   Urinalysis Reflex to Culture    Specimen: Urine, clean catch Result Value Ref Range    Color, UA Yellow Straw/Yellow    Clarity, UA CLOUDY (A) Clear    Glucose, Ur Negative Negative mg/dL    Bilirubin Urine Negative Negative    Ketones, Urine TRACE (A) Negative mg/dL    Specific Gravity, UA 1.025 1.005 - 1.030    Blood, Urine LARGE (A) Negative    pH, UA 7.0 5.0 - 8.0    Protein, UA 30 (A) Negative mg/dL    Urobilinogen, Urine 0.2 <2.0 E.U./dL    Nitrite, Urine Negative Negative    Leukocyte Esterase, Urine TRACE (A) Negative    Microscopic Examination YES     Urine Type NotGiven     Urine Reflex to Culture Yes    Pregnancy, Urine   Result Value Ref Range    HCG(Urine) Pregnancy Test Negative Detects HCG level >20 MIU/mL   Microscopic Urinalysis   Result Value Ref Range    Mucus, UA Rare (A) None Seen /LPF    WBC, UA  (A) 0 - 5 /HPF    RBC, UA  (A) 0 - 4 /HPF    Epithelial Cells, UA 2-5 0 - 5 /HPF    Bacteria, UA 4+ (A) None Seen /HPF         RADIOLOGY  X-RAYS:  I have reviewed radiologic plain film image(s). ALL OTHER NON-PLAIN FILM IMAGES SUCH AS CT, ULTRASOUND AND MRI HAVE BEEN READ BY THE RADIOLOGIST. CT ABDOMEN PELVIS WO CONTRAST Additional Contrast? None   Final Result   A 5 mm obstructing calculus in the mid left ureter causing moderate to severe   hydroureter and hydronephrosis. Edema of the left kidney and Mary ureteral   fat may be due to the severity of the obstruction but underlying infection is   not excluded. Tiny right calculi but no right-sided hydronephrosis or hydroureter. Multiple large calcifications remain in the left upper pole. Rechecks: Physical assessment performed. Resting, appears more comfortable after medication. Critical Care: 30min. Management of suspected urosepsis with IV fluids and antibiotics. Consultation with urology and discussion with the hospitalist service. ED COURSE/MDM  Patient seen and evaluated. Old records reviewed.  Labs and imaging reviewed and results discussed with patient. Patient with left flank pain. Recent ureteral stent removed. Appears to have a new obstructing calculus in the left mid ureter with associated infection. Patient has some hypotension concerning for urosepsis. IV antibiotics and fluids initiated. Case discussed with Dr. Jolie Brantley who requests she be kept n.p.o. and be admitted to the hospitalist service. New Prescriptions    No medications on file       CLINICAL IMPRESSION  1. Ureterolithiasis    2. Acute UTI        Blood pressure (!) 90/59, pulse 63, temperature 97.9 °F (36.6 °C), temperature source Oral, resp. rate 16, height 5' 1\" (1.549 m), weight 170 lb (77.1 kg), SpO2 98 %, not currently breastfeeding. DISPOSITION  Roula Desai was admitted in stable condition.         Jonnie Werner MD  10/01/21 9205

## 2021-10-01 NOTE — PROGRESS NOTES
Patient vital signs stable at time of transfer. Patient has tele box on, [facility] called and patient is visible on their screens. Bedside report given to floor nurse. Patient pain now at tolerable level of 4/10.  of patient updated and sent to room to be with wife.

## 2021-10-01 NOTE — PROGRESS NOTES
Pt arrived to [facility] awaiting procedure. Purse and clothing left in locker room sds 8.

## 2021-10-01 NOTE — ED NOTES
[facility] [id number]  Per   RE Infected ureterolithiasis  2ND ATTEMPT REPAGE @1260  Dr. Wilman Cloud returned page @3009       Hicks Frames  10/01/21 [id number]

## 2021-10-01 NOTE — ED NOTES
Gave report to surgery that said pt would go down at 1130-12     Matthew Mason, ERICK  10/01/21 1843

## 2021-10-01 NOTE — BRIEF OP NOTE
Brief Postoperative Note      Patient: Cristina Mi  YOB: 1980  MRN: [id number]    Date of Procedure: 10/1/2021    Pre-Op Diagnosis: URETERAL STONE    Post-Op Diagnosis: Same       Procedure(s):  CYSTOSCOPY LEFT  URETERAL STENT INSERTION    Surgeon(s):  Leslie Cruz MD    Assistant:  * No surgical staff found *    Anesthesia: General    Estimated Blood Loss (mL): Minimal    Complications: None    Specimens:   * No specimens in log *    Implants:  Implant Name Type Inv.  Item Serial No.  Lot No. LRB No. Used Action   STENT URET 6FR L24CM PERCFLX HYDR+ SFT PGTL TAPR TIP W/O  STENT URET 6FR L24CM PERCFLX HYDR+ SFT PGTL TAPR TIP W/O  [employer]- [id number] Left 1 Implanted         Drains: * No LDAs found *    Findings: stone not seen on fluoro    Plan- needs ureteroscopy in 1-2 weeks, possible d/c to home tomorrow    Electronically signed by Leslie Cruz MD on 10/1/2021 at 2:50 PM

## 2021-10-01 NOTE — CONSULTS
Urology Attending Consult Note      Reason for Consultation: left ureteral stone    History: 34 y/o female had a stone procedure a UC recently and her Urologist there pulled the stent yesterday. She had pain and CT showed a 5mm left mid ureteral stone. There is concern about possible infection. Family History, Social History, Review of Systems:  Reviewed and agreed to as per chart    Vitals:  BP (!) 97/56   Pulse 92   Temp 97.8 °F (36.6 °C) (Oral)   Resp 10   Ht 5' 1\" (1.549 m)   Wt 170 lb (77.1 kg)   SpO2 96%   BMI 32.12 kg/m²   Temp  Av.5 °F (36.4 °C)  Min: 97 °F (36.1 °C)  Max: 97.9 °F (36.6 °C)    Intake/Output Summary (Last 24 hours) at 10/1/2021 1145  Last data filed at 10/1/2021 1036  Gross per 24 hour   Intake 2000 ml   Output --   Net 2000 ml         Physical:  Well developed, well nourished in no acute distress  Mood indicates no abnormalities. Pt doesnt appear depressed  Orientated to time and place  Neck is supple, trachea is midline  Respiratory effort is normal  Cardiovascular show no extremity swelling  Abdomen no masses or hernias are palpated, there is no tenderness. Liver and Spleen appear normal.  Skin show no abnormal lesions  Lymph nodes are not palpated in the inguinal, neck, or axillary area.       Labs:  WBC:    Lab Results   Component Value Date    WBC 9.3 10/01/2021     Hemoglobin/Hematocrit:    Lab Results   Component Value Date    HGB 12.3 10/01/2021    HCT 35.6 10/01/2021     BMP:    Lab Results   Component Value Date     10/01/2021    K 3.7 10/01/2021     10/01/2021    CO2 24 10/01/2021    BUN 19 10/01/2021    LABALBU 3.5 10/01/2021    CREATININE 1.4 10/01/2021    CALCIUM 10.9 10/01/2021    GFRAA 50 10/01/2021    GFRAA >60 2013    LABGLOM 41 10/01/2021     PT/INR:    Lab Results   Component Value Date    PROTIME 11.0 2020    INR 0.95 2020     PTT:    Lab Results   Component Value Date    APTT 28.5 2018   [APTT    Urinalysis: +RBC +WBC    Urine Culture: pending    Imaging: CT- 5mm left mid ureteral stone    Impression/Plan: 5mm left mid ureteral stone  To OR for cysto left stent placment  2.  Possible d/c to home tomorrow    Usha Velasquez MD

## 2021-10-01 NOTE — ANESTHESIA PRE PROCEDURE
Department of Anesthesiology  Preprocedure Note       Name:  Obdulia Swann   Age:  35 y.o.  :  1980                                          MRN:  [id number]         Date:  10/1/2021      Surgeon: Luna Gonzáles):  Suzanna Cartwright MD    Procedure: Procedure(s):  CYSTOSCOPY LEFT  URETERAL STENT INSERTION    Medications prior to admission:   Prior to Admission medications    Medication Sig Start Date End Date Taking? Authorizing Provider   ibuprofen (IBU) 600 MG tablet Take 1 tablet by mouth every 6 hours as needed for Pain 7/3/21   Betty Munson PA-C   acetaminophen (APAP EXTRA STRENGTH) 500 MG tablet Take 2 tablets by mouth every 6 hours as needed for Pain 7/3/21   Betty Munson PA-C   ondansetron (ZOFRAN ODT) 4 MG disintegrating tablet Take 1 tablet by mouth every 8 hours as needed for Nausea Let dissolve in mouth. 7/3/21   Betty Munson PA-C   VRAYLAR 1.5 MG capsule Take 1 capsule by mouth daily 20   Historical Provider, MD   omeprazole (PRILOSEC) 40 MG delayed release capsule Take 1 capsule by mouth every morning (before breakfast) 21   SANTIAGO Ruiz   tamsulosin [facility]) 0.4 MG capsule Take 1 capsule by mouth daily for 5 doses 20  NILES Benson - CNP   diphenhydrAMINE (BENADRYL) 50 MG capsule Take 50 mg by mouth nightly as needed for Sleep    Historical Provider, MD   albuterol sulfate  (90 Base) MCG/ACT inhaler Inhale 2 puffs into the lungs every 4 hours as needed for Wheezing May Sub as needed per insurance. 20   SANTIAGO Ruiz   buprenorphine-naloxone (SUBOXONE) 8-2 MG SUBL SL tablet Place 1 tablet under the tongue daily.  Pt takes 12 mg daily    Historical Provider, MD   QUEtiapine (SEROQUEL XR) 150 MG TB24 extended release tablet Take 150 mg by mouth nightly    Historical Provider, MD       Current medications:    Current Facility-Administered Medications   Medication Dose Route Frequency Provider Last Rate Last Admin    0.9 % sodium chloride colitis ([address] K55.9    Abnormal uterine bleeding (AUB) N93.9    Bipolar 2 disorder (HCC) F31.81    Colitis K52.9    Alcohol abuse F10.10    MDD (major depressive disorder), recurrent episode, mild (HCC) F33.0    MDD (major depressive disorder), recurrent episode, moderate (HCC) F33.1    Transaminitis R74.01    Alcohol withdrawal syndrome with complication ([facility]) S11.114    Intractable migraine without status migrainosus G43.919    Mild intermittent asthma without complication U39.28    Difficulty sleeping G47.9    Bipolar disorder (HCC) F31.9    Polysubstance dependence in early, early partial, sustained full, or sustained partial remission (HCC) F19.21    Tremors of nervous system R25.1    Nephrolithiasis N20.0    Ureterolithiasis N20.1    Hydronephrosis concurrent with and due to calculi of kidney and ureter N13.2    Renal calculi N20.0       Past Medical History:        Diagnosis Date    Acute ischemic colitis ([address] 10/20/15    Anesthesia complication     wakes from anesthesia with migraine    Asthma     Had real bad as child and then grew out of it and  then got pregnant it started acting up again.     Bipolar 1 disorder ([address]     Bipolar affective disorder, current episode depressed ([address] 5/28/2015    Depression     Kidney stones     has had multiple kidney stones     Migraine     Nodule of left lung     pt has non calcified nodules on both lungs    Nodule of right lung     Opiate addiction ([address]     Past hx    Stage 3 chronic kidney disease ([address]     Wears contact lenses        Past Surgical History:        Procedure Laterality Date    COLONOSCOPY  10/20/15    possible ischemic colitis    CYSTOSCOPY Left 08/13/2020    left uretroscopy, with stone extraction    CYSTOSCOPY INSERTION / REMOVAL STENT / STONE Left 8/13/2020    CYSTOSCOPY, URETEROSCOPY, HOLMIUM LASER LITHOTRIPSY, STONE EXTRACTION performed by Cuong Agudelo MD at [place] [address] 10/11/2016    Hysteroscopy, Novasure Ablation    LITHOTRIPSY      x 7    NEPHROLITHOTOMY Left 9/17/2020    LEFT PERCUTANEOUS NEPHROLITHOTOMY, CYSTOSCOPY WITH HOLMIUM LASER LITHOTRIPSY performed by Ofelia Quiros MD at [address] NEPHROSTOMY Left 9/17/2020    NEPHROSTOMY PERCUTANEOUS TUBE INSERTION performed by Ofelia Quiros MD at [address] [facility]  2007    TUBAL LIGATION  2001    URETER STENT PLACEMENT      X 2 then removed    URETER STENT PLACEMENT         Social History:    Social History     Tobacco Use    Smoking status: Current Every Day Smoker     Packs/day: 1.00     Years: 21.00     Pack years: 21.00     Types: Cigarettes     Start date: 4/15/1995    Smokeless tobacco: Never Used   Substance Use Topics    Alcohol use: Not Currently     Comment: quit 2 months ago. Ready to quit: Not Answered  Counseling given: Not Answered      Vital Signs (Current):   Vitals:    10/01/21 0623 10/01/21 0723 10/01/21 0840 10/01/21 0941   BP: 88/70  89/60 (!) 97/56   Pulse: 79 70 70 92   Resp: 14 17 10    Temp:       TempSrc:       SpO2: 95% 90% 98% 96%   Weight:       Height:                                                  BP Readings from Last 3 Encounters:   10/01/21 (!) 97/56   07/03/21 114/73   02/26/21 122/68       NPO Status:                                                                                 BMI:   Wt Readings from Last 3 Encounters:   09/30/21 170 lb (77.1 kg)   02/26/21 186 lb (84.4 kg)   09/19/20 185 lb (83.9 kg)     Body mass index is 32.12 kg/m².     CBC:   Lab Results   Component Value Date    WBC 9.3 10/01/2021    RBC 3.67 10/01/2021    HGB 12.3 10/01/2021    HCT 35.6 10/01/2021    MCV 96.9 10/01/2021    RDW 12.8 10/01/2021     10/01/2021       CMP:   Lab Results   Component Value Date     10/01/2021    K 3.7 10/01/2021     10/01/2021    CO2 24 10/01/2021    BUN 19 10/01/2021    CREATININE 1.4 10/01/2021 GFRAA 50 10/01/2021    GFRAA >60 04/14/2013    AGRATIO 1.3 10/01/2021    LABGLOM 41 10/01/2021    GLUCOSE 96 10/01/2021    PROT 6.2 10/01/2021    PROT 6.8 04/28/2010    CALCIUM 10.9 10/01/2021    BILITOT 0.7 10/01/2021    ALKPHOS 96 10/01/2021    AST 19 10/01/2021    ALT 23 10/01/2021       POC Tests: No results for input(s): POCGLU, POCNA, POCK, POCCL, POCBUN, POCHEMO, POCHCT in the last 72 hours. Coags:   Lab Results   Component Value Date    PROTIME 11.0 08/24/2020    INR 0.95 08/24/2020    APTT 28.5 01/20/2018       HCG (If Applicable):   Lab Results   Component Value Date    PREGTESTUR Negative 10/01/2021        ABGs: No results found for: PHART, PO2ART, LEB3LHT, FSB0LXA, BEART, Q7KGYJUS     Type & Screen (If Applicable):  No results found for: LABABO, LABRH    Drug/Infectious Status (If Applicable):  No results found for: HIV, HEPCAB    COVID-19 Screening (If Applicable):   Lab Results   Component Value Date    COVID19 Not Detected 10/01/2021    COVID19 NOT DETECTED 08/21/2020    COVID19 Not Detected 05/28/2020           Anesthesia Evaluation  Patient summary reviewed and Nursing notes reviewed  Airway: Mallampati: II  TM distance: >3 FB   Neck ROM: full  Mouth opening: > = 3 FB Dental: normal exam         Pulmonary:   (+) decreased breath sounds,  asthma:                            Cardiovascular:Negative CV ROS            Rhythm: regular  Rate: normal                    Neuro/Psych:   (+) headaches: migraine headaches, depression/anxiety             GI/Hepatic/Renal:   (+) renal disease: kidney stones,           Endo/Other: Negative Endo/Other ROS                    Abdominal:   (+) obese,           Vascular: negative vascular ROS. Other Findings:             Anesthesia Plan      general     ASA 2       Induction: intravenous. MIPS: Postoperative opioids intended and Prophylactic antiemetics administered. Anesthetic plan and risks discussed with patient.       Plan discussed with Khushi Samuels MD   10/1/2021

## 2021-10-02 VITALS
HEIGHT: 61 IN | WEIGHT: 170 LBS | RESPIRATION RATE: 18 BRPM | OXYGEN SATURATION: 100 % | HEART RATE: 50 BPM | SYSTOLIC BLOOD PRESSURE: 88 MMHG | DIASTOLIC BLOOD PRESSURE: 56 MMHG | BODY MASS INDEX: 32.1 KG/M2 | TEMPERATURE: 97.8 F

## 2021-10-02 PROBLEM — Z72.0 TOBACCO ABUSE: Status: ACTIVE | Noted: 2021-10-02

## 2021-10-02 PROBLEM — N17.9 AKI (ACUTE KIDNEY INJURY) (HCC): Status: ACTIVE | Noted: 2021-10-02

## 2021-10-02 PROBLEM — K21.9 GASTROESOPHAGEAL REFLUX DISEASE WITHOUT ESOPHAGITIS: Status: ACTIVE | Noted: 2021-10-02

## 2021-10-02 LAB
A/G RATIO: 1.6 (ref 1.1–2.2)
ALBUMIN SERPL-MCNC: 3.6 G/DL (ref 3.4–5)
ALP BLD-CCNC: 87 U/L (ref 40–129)
ALT SERPL-CCNC: 18 U/L (ref 10–40)
ANION GAP SERPL CALCULATED.3IONS-SCNC: 8 MMOL/L (ref 3–16)
AST SERPL-CCNC: 15 U/L (ref 15–37)
BASOPHILS ABSOLUTE: 0.1 K/UL (ref 0–0.2)
BASOPHILS RELATIVE PERCENT: 0.7 %
BILIRUB SERPL-MCNC: 0.5 MG/DL (ref 0–1)
BUN BLDV-MCNC: 17 MG/DL (ref 7–20)
CALCIUM SERPL-MCNC: 9.5 MG/DL (ref 8.3–10.6)
CHLORIDE BLD-SCNC: 103 MMOL/L (ref 99–110)
CO2: 23 MMOL/L (ref 21–32)
CREAT SERPL-MCNC: 1.1 MG/DL (ref 0.6–1.1)
EOSINOPHILS ABSOLUTE: 0 K/UL (ref 0–0.6)
EOSINOPHILS RELATIVE PERCENT: 0.1 %
GFR AFRICAN AMERICAN: >60
GFR NON-AFRICAN AMERICAN: 54
GLOBULIN: 2.3 G/DL
GLUCOSE BLD-MCNC: 111 MG/DL (ref 70–99)
HCT VFR BLD CALC: 33.9 % (ref 36–48)
HEMOGLOBIN: 11.5 G/DL (ref 12–16)
LYMPHOCYTES ABSOLUTE: 1.5 K/UL (ref 1–5.1)
LYMPHOCYTES RELATIVE PERCENT: 15.6 %
MCH RBC QN AUTO: 33.5 PG (ref 26–34)
MCHC RBC AUTO-ENTMCNC: 34 G/DL (ref 31–36)
MCV RBC AUTO: 98.6 FL (ref 80–100)
MONOCYTES ABSOLUTE: 0.5 K/UL (ref 0–1.3)
MONOCYTES RELATIVE PERCENT: 5.1 %
NEUTROPHILS ABSOLUTE: 7.8 K/UL (ref 1.7–7.7)
NEUTROPHILS RELATIVE PERCENT: 78.5 %
PDW BLD-RTO: 13 % (ref 12.4–15.4)
PLATELET # BLD: 273 K/UL (ref 135–450)
PMV BLD AUTO: 8.8 FL (ref 5–10.5)
POTASSIUM REFLEX MAGNESIUM: 4.7 MMOL/L (ref 3.5–5.1)
RBC # BLD: 3.44 M/UL (ref 4–5.2)
SODIUM BLD-SCNC: 134 MMOL/L (ref 136–145)
TOTAL PROTEIN: 5.9 G/DL (ref 6.4–8.2)
URINE CULTURE, ROUTINE: NORMAL
WBC # BLD: 9.9 K/UL (ref 4–11)

## 2021-10-02 PROCEDURE — 80053 COMPREHEN METABOLIC PANEL: CPT

## 2021-10-02 PROCEDURE — 2580000003 HC RX 258: Performed by: UROLOGY

## 2021-10-02 PROCEDURE — 6360000002 HC RX W HCPCS: Performed by: NURSE PRACTITIONER

## 2021-10-02 PROCEDURE — 6370000000 HC RX 637 (ALT 250 FOR IP): Performed by: UROLOGY

## 2021-10-02 PROCEDURE — 6370000000 HC RX 637 (ALT 250 FOR IP): Performed by: NURSE PRACTITIONER

## 2021-10-02 PROCEDURE — 85025 COMPLETE CBC W/AUTO DIFF WBC: CPT

## 2021-10-02 PROCEDURE — 36415 COLL VENOUS BLD VENIPUNCTURE: CPT

## 2021-10-02 RX ORDER — NICOTINE 21 MG/24HR
1 PATCH, TRANSDERMAL 24 HOURS TRANSDERMAL DAILY
Qty: 30 PATCH | Refills: 3 | Status: SHIPPED | OUTPATIENT
Start: 2021-10-03

## 2021-10-02 RX ORDER — TAMSULOSIN HYDROCHLORIDE 0.4 MG/1
0.4 CAPSULE ORAL DAILY
Qty: 7 CAPSULE | Refills: 0 | Status: SHIPPED | OUTPATIENT
Start: 2021-10-02 | End: 2022-03-28

## 2021-10-02 RX ADMIN — OXYBUTYNIN CHLORIDE 5 MG: 5 TABLET ORAL at 10:34

## 2021-10-02 RX ADMIN — MORPHINE SULFATE 2 MG: 2 INJECTION, SOLUTION INTRAMUSCULAR; INTRAVENOUS at 06:41

## 2021-10-02 RX ADMIN — OXYCODONE 5 MG: 5 TABLET ORAL at 04:16

## 2021-10-02 RX ADMIN — Medication 10 ML: at 10:34

## 2021-10-02 ASSESSMENT — PAIN SCALES - GENERAL
PAINLEVEL_OUTOF10: 6
PAINLEVEL_OUTOF10: 6
PAINLEVEL_OUTOF10: 7

## 2021-10-02 ASSESSMENT — PAIN DESCRIPTION - ORIENTATION: ORIENTATION: LEFT

## 2021-10-02 ASSESSMENT — PAIN DESCRIPTION - PROGRESSION
CLINICAL_PROGRESSION: GRADUALLY IMPROVING

## 2021-10-02 ASSESSMENT — PAIN DESCRIPTION - LOCATION: LOCATION: FLANK

## 2021-10-02 ASSESSMENT — PAIN DESCRIPTION - DESCRIPTORS: DESCRIPTORS: ACHING

## 2021-10-02 NOTE — PROGRESS NOTES
Patient:  Mago Ladd  YOB: 1980   Date of Service:  10/02/21      Urology Attending Daily Progress Note    Chief complaint: \" Feeling better today\"    Interval HPI:  The patient did well overnight. Pain is controlled with medications. Tolerating diet. Denies nausea, vomiting, fevers, or chest pain. Objective:    Patient Vitals for the past 8 hrs:   BP Temp Temp src Pulse Resp SpO2   10/02/21 1217 (!) 88/56 97.8 °F (36.6 °C) Oral 50 18 100 %   10/02/21 0832 (!) 86/55 98.2 °F (36.8 °C) Oral 56 16 98 %     I/O last 3 completed shifts: In: 3084.4 [P.O.:180; I.V.:1904.4; IV Piggyback:1000]  Out: 500 [Urine:500]     Physical Exam:   Constitutional: comfortable in hospital bed, no acute distress  Abdomen: soft, nontender   Psych: normal mood and affect, appropriately answers questions   Skin, extremities: stable, exposed skin intact, no digital cyanosis     Labs:     No results found for: PSA  Lab Results   Component Value Date    CREATININE 1.1 10/02/2021    CREATININE 1.4 (H) 10/01/2021    CREATININE 1.1 07/03/2021     Lab Results   Component Value Date    COLORU Yellow 10/01/2021    NITRU Negative 10/01/2021    GLUCOSEU Negative 10/01/2021    GLUCOSEU Negative 07/23/2010    KETUA TRACE 10/01/2021    UROBILINOGEN 0.2 10/01/2021    BILIRUBINUR Negative 10/01/2021    BILIRUBINUR NEG 02/26/2021    BILIRUBINUR Negative 07/23/2010     Lab Results   Component Value Date    WBC 9.9 10/02/2021    HGB 11.5 (L) 10/02/2021    HCT 33.9 (L) 10/02/2021    MCV 98.6 10/02/2021     10/02/2021       Radiology:  \"Imaging was independently reviewed by myself and I agree with the radiology interpretation    Assessment:  5mm left mid ureteral stone    POD1 s/p cysto left stent placement    DC to home today    Would give Flomax for stent pain     Will need definitive stone management in the coming weeks- this can be done with  or our team- she will call the office to schedule    Deya Diallo MD  [facility]

## 2021-10-02 NOTE — DISCHARGE SUMMARY
Urology team was consulted with plan for cystoscopy and stent placement. Hospitalist consulted for admission for further evaluation and management. Today, patient was seen and examined at bedside. Patient stated that she is feeling \"better than yesterday\". Pain is controlled with medication. Patient was complaining of waking up at night with acid reflux. Patient stated that she has hiatal hernia. Patient stated that she will follow up with PCP if symptoms got worse. She has been taking Prilosec and it helps. Patient said that Tums and milk also help with the acid reflux. Patient denies any abdominal pain, nausea, vomiting, diarrhea, chest pain, SOB or other constitutional symptoms. Hospital Course  by Problem list:  Acute left-sided flank pain secondary to left obstructing calculus in mid ureter causing moderate to severe hydronephrosis. -Patient with history of  ureterolithiasis s/p ureter stent removal on 9/30/2021. Follows with  urology Dr. Franklin Patricio  -CT abdomen pelvis on admission with 5 mm obstructing left mid ureteral stone causing moderate to severe hydroureter and hydronephrosis. - Urologist consulted from ED - S/p cystoscopy and left ureteral stent insertion on 10/1/2021  - urologist recommended follow-up ureteroscopy through her primary urologist in 1-2 weeks  - Given IVF and adequate hydration  - Started Cefepime 2000 mg IVPB(10/1/2021)  - Urine culture, blood cultures showed no growth   - Stopped cefepime (10/2/2021)  - Monitored CBC and BMP daily  - Educated patient about kidney stones prevention  - Educated patient about healthy lifestyle      DONATO  (POA)  -likely prerenal etiology  - Resolved (10/2/2021)  Creatinine 1.4 (10/1/2021)--> 1.1(10/2/2021); baseline within normal limits earlier this year.   - Given IVF and PO hydration     Pain due to above  - Toradol injection 30 mg once  - Tylenol 650 mg q6h    Tobacco use disorder:  - Counseled patient on risks and harm  - Encouraged patient smoking cessation  - Patient is willing to stop and will follow up with PCP for further management     GI PPX: Zofran injection 4 mg once      Physical Exam Performed:   BP (!) 88/56   Pulse 50   Temp 97.8 °F (36.6 °C) (Oral)   Resp 18   Ht 5' 1\" (1.549 m)   Wt 170 lb (77.1 kg)   SpO2 100%   BMI 32.12 kg/m²       General appearance:  No apparent distress, appears stated age and cooperative. HEENT:  Normal cephalic, atraumatic without obvious deformity. Pupils equal, round, and reactive to light. Extra ocular muscles intact. Conjunctivae/corneas clear. Neck: Supple, with full range of motion. No jugular venous distention. Trachea midline. Respiratory:  Normal respiratory effort. Clear to auscultation, bilaterally without Rales/Wheezes/Rhonchi. Cardiovascular:  Regular rate and rhythm with normal S1/S2 without murmurs, rubs or gallops. Abdomen: Soft, non-tender, non-distended with normal bowel sounds. Musculoskeletal:  No clubbing, cyanosis or edema bilaterally. Full range of motion without deformity. Skin: Skin color, texture, turgor normal.  No rashes or lesions. Neurologic:  Neurovascularly intact without any focal sensory/motor deficits. Cranial nerves: II-XII intact, grossly non-focal.  Psychiatric:  Alert and oriented, thought content appropriate, normal insight  Capillary Refill: Brisk,< 3 seconds   Peripheral Pulses: +2 palpable, equal bilaterally       Labs:  For convenience and continuity at follow-up the following most recent labs are provided:      CBC:    Lab Results   Component Value Date    WBC 9.9 10/02/2021    HGB 11.5 10/02/2021    HCT 33.9 10/02/2021     10/02/2021       Renal:    Lab Results   Component Value Date     10/02/2021    K 4.7 10/02/2021     10/02/2021    CO2 23 10/02/2021    BUN 17 10/02/2021    CREATININE 1.1 10/02/2021    CALCIUM 9.5 10/02/2021    PHOS 2.7 08/31/2014         Significant Diagnostic Studies    Radiology:   XR ABDOMEN (KUB) (SINGLE AP VIEW)   Final Result   1. See above. FLUORO FOR SURGICAL PROCEDURES   Final Result      XR CHEST PORTABLE   Final Result   Hazy appearance at the bases is likely artifactual with the positioning. The   remaining lungs are clear and no pneumothorax. CT ABDOMEN PELVIS WO CONTRAST Additional Contrast? None   Final Result   A 5 mm obstructing calculus in the mid left ureter causing moderate to severe   hydroureter and hydronephrosis. Edema of the left kidney and Mary ureteral   fat may be due to the severity of the obstruction but underlying infection is   not excluded. Tiny right calculi but no right-sided hydronephrosis or hydroureter. Multiple large calcifications remain in the left upper pole. Consults:     IP CONSULT TO UROLOGY  IP CONSULT TO HOSPITALIST    Disposition:  Home    Condition at Discharge: Stable    Discharge Instructions/Follow-up:    - continue adequate hydration  - Follow up with PCP on smoking cessation  - Follow up with PCP if GERD worsen    Code Status:  Full Code     Activity: activity as tolerated    Diet: regular diet      Discharge Medications:     Current Discharge Medication List           Details   nicotine (NICODERM CQ) 21 MG/24HR Place 1 patch onto the skin daily  Qty: 30 patch, Refills: 3              Details   tamsulosin (FLOMAX) 0.4 MG capsule Take 1 capsule by mouth daily for 7 doses  Qty: 7 capsule, Refills: 0              Details   metFORMIN (GLUCOPHAGE) 500 MG tablet Take 500 mg by mouth 2 times daily (with meals)      Lisdexamfetamine Dimesylate (VYVANSE) 40 MG CAPS Take 40 mg by mouth daily.       ketorolac (TORADOL) 10 MG tablet Take 10 mg by mouth daily Daily as needed      oxybutynin (DITROPAN) 5 MG tablet Take 5 mg by mouth 3 times daily      acetaminophen (APAP EXTRA STRENGTH) 500 MG tablet Take 2 tablets by mouth every 6 hours as needed for Pain  Qty: 40 tablet, Refills: 0      ondansetron (ZOFRAN ODT) 4 MG disintegrating tablet Take 1 Physician

## 2021-10-02 NOTE — OP NOTE
[facility]                 PriteshMolly villatoro                                 OPERATIVE REPORT    PATIENT NAME: Raquel Leger                  :        1980  MED REC NO:   [id number]                          ROOM:       7187  ACCOUNT NO:   [de-identified]                           ADMIT DATE: 10/01/2021  PROVIDER:     Silverio Calixto. Destiny Pan MD    DATE OF PROCEDURE:  10/01/2021    LOCATION:  [place] [address] DIAGNOSIS:  Left mid ureteral stone. POSTOPERATIVE DIAGNOSIS:  Left mid ureteral stone with probable UTI. OPERATION PERFORMED:  Cystoscopy, left stent placement. SURGEON:  Mervat Lux MD    INDICATIONS FOR PROCEDURE:  The patient is a 47-year-old female who had  recent procedure done by urologist at [facility], acute flank pain. CT shows a  5-mm left mid ureteral stone. UA shows possible infection. Risks and  benefits of cystoscopy and left stent placement were discussed with the  patient. She agreed to proceed. OPERATIVE PROCEDURE:  The patient had been receiving IV antibiotics, had  general anesthesia, was in the lithotomy position. Genitalia prepped  and draped in the usual sterile fashion. A 21-Rwandan rigid cystoscope  was inserted into the patient's urethra. I found the left ureteric  orifice. I placed a ZIPwire up under fluoroscopic guidance. I then  placed a 6-[place]andan 24-cm double-J ureteral stent. I removed the string  and wire and the stent coiled up in the kidney and down the bladder. The bladder was emptied. She was awoken and sent to the recovery room  in good condition. She tolerated the procedure well. PLAN:  She can follow up with her  urologist in 1 to 2 weeks for left  ureteroscopy.     EBL: Akil Champion MD    D: 10/01/2021 18:54:16       T: 10/01/2021 22:36:42     [email or web address]  Job#: [id number]     Doc#: [id number]    CC:

## 2021-10-04 ENCOUNTER — TELEPHONE (OUTPATIENT)
Dept: FAMILY MEDICINE CLINIC | Age: 41
End: 2021-10-04

## 2021-10-04 NOTE — TELEPHONE ENCOUNTER
[facility] Transitions Initial Follow Up Call    Outreach made within 2 business days of discharge: Yes    Patient: Janelle Oliveros Patient : 1980   MRN: [id number]  Reason for Admission: There are no discharge diagnoses documented for the most recent discharge. Discharge Date: 10/2/21       Spoke with: Patient- no answer/ left message for return call    Discharge department/facility: [facility]    Non-face-to-face services provided:      TCM Interactive Patient Contact:  Was patient able to fill all prescriptions:   Was patient instructed to bring all medications to the follow-up visit:   Is patient taking all medications as directed in the discharge summary? Does patient understand their discharge instructions:   Does patient have questions or concerns that need addressed prior to 7-14 day follow up office visit:     Scheduled appointment with PCP within 7-14 days    Follow Up  No future appointments.     Romero English RN

## 2021-10-05 LAB
BLOOD CULTURE, ROUTINE: NORMAL
CULTURE, BLOOD 2: NORMAL

## 2021-10-06 NOTE — TELEPHONE ENCOUNTER
[facility] Transitions Initial Follow Up Call    Outreach made within 2 business days of discharge: Yes    Patient: Sil Marshall Patient : 1980   MRN: [id number]  Reason for Admission: There are no discharge diagnoses documented for the most recent discharge. Discharge Date: 10/2/21       Spoke with: Patient     Discharge department/facility: [facility]     TCM Interactive Patient Contact:  Was patient able to fill all prescriptions: Yes  Was patient instructed to bring all medications to the follow-up visit: Yes  Is patient taking all medications as directed in the discharge summary? Yes  Does patient understand their discharge instructions: Yes  Does patient have questions or concerns that need addressed prior to 7-14 day follow up office visit: no    Scheduled appointment with PCP within 7-14 days    Follow Up  No future appointments.     Sarahy Jasso

## 2021-10-15 ENCOUNTER — HOSPITAL ENCOUNTER (OUTPATIENT)
Age: 41
Discharge: HOME | End: 2021-10-15
Payer: COMMERCIAL

## 2021-10-15 ENCOUNTER — HOSPITAL ENCOUNTER (EMERGENCY)
Age: 41
Discharge: HOME | End: 2021-10-15
Payer: COMMERCIAL

## 2021-10-15 ENCOUNTER — APPOINTMENT (OUTPATIENT)
Dept: CT IMAGING | Age: 41
End: 2021-10-15
Payer: COMMERCIAL

## 2021-10-15 VITALS
SYSTOLIC BLOOD PRESSURE: 110 MMHG | HEIGHT: 62 IN | WEIGHT: 170 LBS | HEART RATE: 98 BPM | TEMPERATURE: 99 F | RESPIRATION RATE: 14 BRPM | DIASTOLIC BLOOD PRESSURE: 75 MMHG | OXYGEN SATURATION: 98 % | BODY MASS INDEX: 31.28 KG/M2

## 2021-10-15 DIAGNOSIS — N20.1 URETEROLITHIASIS: ICD-10-CM

## 2021-10-15 DIAGNOSIS — N23 RENAL COLIC: ICD-10-CM

## 2021-10-15 DIAGNOSIS — N30.01 ACUTE CYSTITIS WITH HEMATURIA: ICD-10-CM

## 2021-10-15 DIAGNOSIS — R10.9 LEFT FLANK PAIN: Primary | ICD-10-CM

## 2021-10-15 LAB
A/G RATIO: 1.6 (ref 1.1–2.2)
ALBUMIN SERPL-MCNC: 4.3 G/DL (ref 3.4–5)
ALP BLD-CCNC: 109 U/L (ref 40–129)
ALT SERPL-CCNC: 10 U/L (ref 10–40)
ANION GAP SERPL CALCULATED.3IONS-SCNC: 8 MMOL/L (ref 3–16)
AST SERPL-CCNC: 14 U/L (ref 15–37)
BASOPHILS ABSOLUTE: 0.1 K/UL (ref 0–0.2)
BASOPHILS RELATIVE PERCENT: 0.8 %
BILIRUB SERPL-MCNC: 0.4 MG/DL (ref 0–1)
BILIRUBIN URINE: ABNORMAL
BLOOD, URINE: ABNORMAL
BUN BLDV-MCNC: 14 MG/DL (ref 7–20)
CALCIUM SERPL-MCNC: 10 MG/DL (ref 8.3–10.6)
CHLORIDE BLD-SCNC: 104 MMOL/L (ref 99–110)
CLARITY: ABNORMAL
CO2: 27 MMOL/L (ref 21–32)
COLOR: ABNORMAL
CREAT SERPL-MCNC: 1 MG/DL (ref 0.6–1.1)
CRYSTALS, UA: ABNORMAL /HPF
EOSINOPHILS ABSOLUTE: 0.1 K/UL (ref 0–0.6)
EOSINOPHILS RELATIVE PERCENT: 1.4 %
EPITHELIAL CELLS, UA: ABNORMAL /HPF (ref 0–5)
GFR AFRICAN AMERICAN: >60
GFR NON-AFRICAN AMERICAN: >60
GLOBULIN: 2.7 G/DL
GLUCOSE BLD-MCNC: 109 MG/DL (ref 70–99)
GLUCOSE URINE: NEGATIVE MG/DL
HCG QUALITATIVE: NEGATIVE
HCT VFR BLD CALC: 38.3 % (ref 36–48)
HEMOGLOBIN: 13.2 G/DL (ref 12–16)
KETONES, URINE: NEGATIVE MG/DL
LACTIC ACID: 1.4 MMOL/L (ref 0.4–2)
LEUKOCYTE ESTERASE, URINE: ABNORMAL
LYMPHOCYTES ABSOLUTE: 1.7 K/UL (ref 1–5.1)
LYMPHOCYTES RELATIVE PERCENT: 18.7 %
MCH RBC QN AUTO: 33.9 PG (ref 26–34)
MCHC RBC AUTO-ENTMCNC: 34.6 G/DL (ref 31–36)
MCV RBC AUTO: 98.1 FL (ref 80–100)
MICROSCOPIC EXAMINATION: YES
MONOCYTES ABSOLUTE: 0.6 K/UL (ref 0–1.3)
MONOCYTES RELATIVE PERCENT: 7.1 %
NEUTROPHILS ABSOLUTE: 6.5 K/UL (ref 1.7–7.7)
NEUTROPHILS RELATIVE PERCENT: 72 %
NITRITE, URINE: POSITIVE
PDW BLD-RTO: 13.3 % (ref 12.4–15.4)
PH UA: 7 (ref 5–8)
PLATELET # BLD: 247 K/UL (ref 135–450)
PMV BLD AUTO: 8.5 FL (ref 5–10.5)
POTASSIUM SERPL-SCNC: 4.1 MMOL/L (ref 3.5–5.1)
PROTEIN UA: 100 MG/DL
RBC # BLD: 3.91 M/UL (ref 4–5.2)
RBC UA: ABNORMAL /HPF (ref 0–4)
SODIUM BLD-SCNC: 139 MMOL/L (ref 136–145)
SPECIFIC GRAVITY UA: >=1.03 (ref 1–1.03)
TOTAL PROTEIN: 7 G/DL (ref 6.4–8.2)
URINE TYPE: ABNORMAL
UROBILINOGEN, URINE: 0.2 E.U./DL
WBC # BLD: 9 K/UL (ref 4–11)
WBC UA: ABNORMAL /HPF (ref 0–5)
YEAST: PRESENT /HPF

## 2021-10-15 PROCEDURE — 87086 URINE CULTURE/COLONY COUNT: CPT

## 2021-10-15 PROCEDURE — 83605 ASSAY OF LACTIC ACID: CPT

## 2021-10-15 PROCEDURE — 6360000002 HC RX W HCPCS: Performed by: NURSE PRACTITIONER

## 2021-10-15 PROCEDURE — 96365 THER/PROPH/DIAG IV INF INIT: CPT

## 2021-10-15 PROCEDURE — 2580000003 HC RX 258: Performed by: NURSE PRACTITIONER

## 2021-10-15 PROCEDURE — 6360000004 HC RX CONTRAST MEDICATION: Performed by: NURSE PRACTITIONER

## 2021-10-15 PROCEDURE — 85025 COMPLETE CBC W/AUTO DIFF WBC: CPT

## 2021-10-15 PROCEDURE — 74177 CT ABD & PELVIS W/CONTRAST: CPT

## 2021-10-15 PROCEDURE — 80053 COMPREHEN METABOLIC PANEL: CPT

## 2021-10-15 PROCEDURE — 99285 EMERGENCY DEPT VISIT HI MDM: CPT

## 2021-10-15 PROCEDURE — 87186 SC STD MICRODIL/AGAR DIL: CPT

## 2021-10-15 PROCEDURE — 81001 URINALYSIS AUTO W/SCOPE: CPT

## 2021-10-15 PROCEDURE — 96375 TX/PRO/DX INJ NEW DRUG ADDON: CPT

## 2021-10-15 PROCEDURE — 84703 CHORIONIC GONADOTROPIN ASSAY: CPT

## 2021-10-15 PROCEDURE — 87077 CULTURE AEROBIC IDENTIFY: CPT

## 2021-10-15 RX ORDER — KETOROLAC TROMETHAMINE 30 MG/ML
30 INJECTION, SOLUTION INTRAMUSCULAR; INTRAVENOUS ONCE
Status: COMPLETED | OUTPATIENT
Start: 2021-10-15 | End: 2021-10-15

## 2021-10-15 RX ORDER — CEFUROXIME AXETIL 250 MG/1
250 TABLET ORAL 2 TIMES DAILY
Qty: 20 TABLET | Refills: 0 | Status: SHIPPED | OUTPATIENT
Start: 2021-10-15 | End: 2021-10-25

## 2021-10-15 RX ORDER — KETOROLAC TROMETHAMINE 10 MG/1
10 TABLET, FILM COATED ORAL EVERY 6 HOURS PRN
Qty: 20 TABLET | Refills: 0 | Status: SHIPPED | OUTPATIENT
Start: 2021-10-15 | End: 2022-03-28

## 2021-10-15 RX ORDER — 0.9 % SODIUM CHLORIDE 0.9 %
1000 INTRAVENOUS SOLUTION INTRAVENOUS ONCE
Status: COMPLETED | OUTPATIENT
Start: 2021-10-15 | End: 2021-10-15

## 2021-10-15 RX ORDER — PHENAZOPYRIDINE HYDROCHLORIDE 100 MG/1
100 TABLET, FILM COATED ORAL 3 TIMES DAILY PRN
Qty: 20 TABLET | Refills: 0 | Status: SHIPPED | OUTPATIENT
Start: 2021-10-15 | End: 2022-03-28

## 2021-10-15 RX ADMIN — CEFTRIAXONE SODIUM 1000 MG: 1 INJECTION, POWDER, FOR SOLUTION INTRAMUSCULAR; INTRAVENOUS at 20:23

## 2021-10-15 RX ADMIN — SODIUM CHLORIDE 1000 ML: 9 INJECTION, SOLUTION INTRAVENOUS at 20:24

## 2021-10-15 RX ADMIN — KETOROLAC TROMETHAMINE 30 MG: 30 INJECTION, SOLUTION INTRAMUSCULAR at 21:49

## 2021-10-15 RX ADMIN — IOPAMIDOL 75 ML: 755 INJECTION, SOLUTION INTRAVENOUS at 20:03

## 2021-10-15 ASSESSMENT — PAIN SCALES - GENERAL
PAINLEVEL_OUTOF10: 4
PAINLEVEL_OUTOF10: 6

## 2021-10-15 NOTE — ED PROVIDER NOTES
[facility] Emergency Department    CHIEF COMPLAINT  Post-op Problem (Pt reports left flank pain, radiates around to front lower abdomen. Pt reports had a stent placed that had become infected, pt also suspects UTI. Pt provided UA today. called Dr Sumi Ladd today, sent to ER for eval. ) and Flank Pain      HISTORY OF PRESENT ILLNESS  Nuzhat Manzano is a 35 y.o. female who presents to the ED complaining of left flank pain. Patient reports over the last month she has been battling a large kidney stone in her left ureter. Patient reports they were unable to retrieve the kidney stone so she has had 2 stents placed in the last month. The 1st stent was then infected had to be removed and another stent was placed. Patient reports for approximately 10 days now she has been experiencing increased left flank pain, dysuria, hematuria, body aches, chills, fever. Patient concerned that her stent may be infected again. Patient did provide a outpatient urinalysis ordered by urology when they reviewed the results they sent her into the emergency department for further evaluation. Patient took Tylenol just prior to arrival for fever and pain with some relief. Patient reports upon arrival to emergency department her pain is tolerable. Patient denies nausea, vomiting, diarrhea, constipation. Patient denies urinary retention. No other complaints, modifying factors or associated symptoms. Nursing notes reviewed. Past Medical History:   Diagnosis Date    Acute ischemic colitis ([address] 10/20/15    Anesthesia complication     wakes from anesthesia with migraine    Asthma     Had real bad as child and then grew out of it and  then got pregnant it started acting up again.     Bipolar 1 disorder ([address]     Bipolar affective disorder, current episode depressed ([address] 5/28/2015    Depression     Kidney stones     has had multiple kidney stones     Migraine     Nodule of left lung     pt has non calcified nodules on both lungs    Nodule of right lung     Opiate addiction ([facility]     Past hx    Stage 3 chronic kidney disease ([address]     Wears contact lenses      Past Surgical History:   Procedure Laterality Date    COLONOSCOPY  10/20/15    possible ischemic colitis    CYSTOSCOPY Left 08/13/2020    left uretroscopy, with stone extraction    CYSTOSCOPY Left 10/1/2021    CYSTOSCOPY LEFT  URETERAL STENT INSERTION performed by Marline Starks MD at [address] [facility] / Marlene Pinzon Left 8/13/2020    CYSTOSCOPY, URETEROSCOPY, HOLMIUM LASER LITHOTRIPSY, STONE EXTRACTION performed by Riddhi Vega MD at [address] [facility]  10/11/2016    Hysteroscopy, Novasure Ablation    LITHOTRIPSY      x 7    NEPHROLITHOTOMY Left 9/17/2020    LEFT PERCUTANEOUS NEPHROLITHOTOMY, CYSTOSCOPY WITH HOLMIUM LASER LITHOTRIPSY performed by Suzie Vega MD at [facility] [address] NEPHROSTOMY Left 9/17/2020    NEPHROSTOMY PERCUTANEOUS TUBE INSERTION performed by Suzie Vega MD at [address] [address]    TUBAL LIGATION  2001    [address]      X 2 then removed    URETER STENT PLACEMENT       Family History   Problem Relation Age of Onset    High Blood Pressure Father     Kidney Disease Father     Cancer Maternal Grandmother     Cancer Maternal Grandfather     Kidney Disease Maternal Grandfather     Cancer Paternal Grandmother     Cancer Paternal Grandfather     Cancer Other 3        ALL    Diabetes Maternal Uncle     COPD Mother     Depression Sister     Ovarian Cancer Sister      Social History     Socioeconomic History    Marital status:      Spouse name: Ngoc Frazier    Number of children: 3    Years of education: 15    Highest education level: Not on file   Occupational History    Occupation: unemployed   Tobacco Use    Smoking status: Current Every Day Smoker     Packs/day: 1.00     Years: 21.00     Pack years: 21.00     Types: Cigarettes     Start date: 4/15/1995    Smokeless tobacco: Never Used   Vaping Use    Vaping Use: Never used   Substance and Sexual Activity    Alcohol use: Not Currently     Comment: quit 2 months ago.  Drug use: Yes     Types: Marijuana     Comment: occas    Sexual activity: Yes     Partners: Male   Other Topics Concern    Not on file   Social History Narrative    Not on file     Social Determinants of Health     Financial Resource Strain: High Risk    Difficulty of Paying Living Expenses: Hard   Food Insecurity: Food Insecurity Present    Worried About Running Out of Food in the Last Year: Sometimes true    Shayna of Food in the Last Year: Sometimes true   Transportation Needs: No Transportation Needs    Lack of Transportation (Medical): No    Lack of Transportation (Non-Medical): No   Physical Activity:     Days of Exercise per Week:     Minutes of Exercise per Session:    Stress:     Feeling of Stress :    Social Connections:     Frequency of Communication with Friends and Family:     Frequency of Social Gatherings with Friends and Family:     Attends Yazdanism Services:     Active Member of Clubs or Organizations:     Attends Club or Organization Meetings:     Marital Status:    Intimate Partner Violence:     Fear of Current or Ex-Partner:     Emotionally Abused:     Physically Abused:     Sexually Abused:      No current facility-administered medications for this encounter.      Current Outpatient Medications   Medication Sig Dispense Refill    cefUROXime (CEFTIN) 250 MG tablet Take 1 tablet by mouth 2 times daily for 10 days 20 tablet 0    phenazopyridine (PYRIDIUM) 100 MG tablet Take 1 tablet by mouth 3 times daily as needed for Pain 20 tablet 0    ketorolac (TORADOL) 10 MG tablet Take 1 tablet by mouth every 6 hours as needed for Pain 20 tablet 0    nicotine (NICODERM CQ) 21 MG/24HR Place 1 patch onto the skin daily 30 patch 3    tamsulosin (FLOMAX) 0.4 MG capsule Take 1 capsule by mouth daily for 7 doses 7 capsule 0    metFORMIN (GLUCOPHAGE) 500 MG tablet Take 500 mg by mouth 2 times daily (with meals)      Lisdexamfetamine Dimesylate (VYVANSE) 40 MG CAPS Take 40 mg by mouth daily.  ketorolac (TORADOL) 10 MG tablet Take 10 mg by mouth daily Daily as needed      oxybutynin (DITROPAN) 5 MG tablet Take 5 mg by mouth 3 times daily      ibuprofen (IBU) 600 MG tablet Take 1 tablet by mouth every 6 hours as needed for Pain 30 tablet 0    acetaminophen (APAP EXTRA STRENGTH) 500 MG tablet Take 2 tablets by mouth every 6 hours as needed for Pain 40 tablet 0    ondansetron (ZOFRAN ODT) 4 MG disintegrating tablet Take 1 tablet by mouth every 8 hours as needed for Nausea Let dissolve in mouth. 10 tablet 0    VRAYLAR 1.5 MG capsule Take 1 capsule by mouth daily      omeprazole (PRILOSEC) 40 MG delayed release capsule Take 1 capsule by mouth every morning (before breakfast) 90 capsule 1    diphenhydrAMINE (BENADRYL) 50 MG capsule Take 50 mg by mouth nightly as needed for Sleep      albuterol sulfate  (90 Base) MCG/ACT inhaler Inhale 2 puffs into the lungs every 4 hours as needed for Wheezing May Sub as needed per insurance. 1 Inhaler 5    buprenorphine-naloxone (SUBOXONE) 8-2 MG SUBL SL tablet Place 1 tablet under the tongue daily.  Pt takes 12 mg daily      QUEtiapine (SEROQUEL XR) 150 MG TB24 extended release tablet Take 150 mg by mouth nightly       Facility-Administered Medications Ordered in Other Encounters   Medication Dose Route Frequency Provider Last Rate Last Admin    lactated ringers infusion   IntraVENous Continuous Eleonora Bowman MD   New Bag at 10/01/21 6636    sodium chloride flush 0.9 % injection 10 mL  10 mL IntraVENous 2 times per day Eleonora Bowman MD        sodium chloride flush 0.9 % injection 10 mL  10 mL IntraVENous PRN Eleonora Bowman MD         No Known Allergies    REVIEW OF SYSTEMS  10 systems reviewed, pertinent positives per HPI otherwise noted to be negative    PHYSICAL EXAM  /75   Pulse 98   Temp 99 °F (37.2 °C) (Oral)   Resp 14   Ht 5' 2\" (1.575 m)   Wt 170 lb (77.1 kg)   SpO2 98%   BMI 31.09 kg/m²   GENERAL APPEARANCE: Awake and alert. Cooperative. No acute distress. Vital signs are stable. HEAD: Normocephalic. Atraumatic. EYES: PERRL. EOM's grossly intact. ENT: Mucous membranes are moist.   NECK: Supple. Normal ROM. HEART: Tachycardiac. Distal pulses are equal and intact. Cap refill less than 2 seconds. LUNGS: Respirations unlabored. CTAB. Good air exchange. Speaking comfortably in full sentences. No wheezing, rhonchi, rales, stridor. ABDOMEN: Soft. Non-distended. Non-tender. No guarding or rebound. No rigidity. Bowel sounds are present. Negative paul's. Negative McBurney's point. Left CVA tenderness. EXTREMITIES: No peripheral edema. Moves all extremities equally. All extremities neurovascularly intact. SKIN: Warm and dry. No acute rashes. NEUROLOGICAL: Alert and oriented. No gross facial drooping. Strength 5/5, sensation intact. PSYCHIATRIC: Normal mood and affect. SCREENINGS       RADIOLOGY      CT ABDOMEN PELVIS W IV CONTRAST Additional Contrast? None    Result Date: 10/15/2021  EXAMINATION: CT OF THE ABDOMEN AND PELVIS WITH CONTRAST 10/15/2021 7:51 pm TECHNIQUE: CT of the abdomen and pelvis was performed with the administration of intravenous contrast. Multiplanar reformatted images are provided for review. Dose modulation, iterative reconstruction, and/or weight based adjustment of the mA/kV was utilized to reduce the radiation dose to as low as reasonably achievable.  COMPARISON: 10/01/2021 HISTORY: ORDERING SYSTEM PROVIDED HISTORY: left flank pain stent concern for infection TECHNOLOGIST PROVIDED HISTORY: Reason for exam:->left flank pain stent concern for infection Additional Contrast?->None Decision Support Exception - unselect if not a suspected or confirmed emergency medical condition->Emergency Medical Condition (MA) Reason for Exam: recent stent placed, now having left flank pain, pt states thinks she has an infection Acuity: Acute Type of Exam: Initial FINDINGS: Lower Chest: The lung bases are clear. Organs: The liver is mildly enlarged with hypertrophy of the caudate and left lobes and fatty replacement throughout. The gallbladder, pancreas, and bile ducts are normal.  The spleen is unremarkable. The adrenals are normal. There is a 3 mm stone lower pole right kidney which is unchanged with no hydronephrosis. There are several renal stones scattered along the upper pole left kidney measuring up to 17 mm which is unchanged. There is mild hydronephrosis along the upper pole extending into the renal pelvis which is less prominent. There is a subcapsular fluid collection along the anterior aspect of the left kidney laterally extending inferiorly which measures 3.6 x 1.6 cm which may have been present previously and was isodense previously and is now hypodense and less prominent. There is a double-J ureteral stent on the left extending into the left ureter which is mildly dilated to the bladder. The stent is looped in the bladder distally. The previously seen stone in the distal left ureter along the pelvis is possibly visualized distally and not significantly changed in position. GI/Bowel: The appendix is normal.  There is mild feces scattered in the colon. The bowel gas pattern is unremarkable. The mesentery is unremarkable. There is no bowel wall thickening. Pelvis: The bladder is unremarkable. The uterus is atrophic with no adnexal mass or free fluid. The mesentery is unremarkable. Peritoneum/Retroperitoneum: The aorta is unremarkable with no retroperitoneal mass. There are small shotty lymph nodes along the periaortic region on the left measuring up to 1 cm which are more prominent. Bones/Soft Tissues: The bones are intact.   There is a 2 cm hernia along the anterior abdominal wall superiorly which is unchanged     Status post placement of a double J ureteral stent on the left which is in good position with multiple renal stones on the left which are unchanged. There is mild hydronephrosis and hydroureter on the left which is less prominent and the previously seen stone in the distal left ureter may be present within the ureter distally and but is not as well visualized. Small subcapsular fluid collection along the anterior aspect of left kidney which may have been present previously and is more hypodense now and could represent a slowly resolving subcapsular hematoma vs fluid from other etiology. Recommend urological correlation and continued follow-up. Small renal stone on the right with no hydronephrosis. Atrophic uterus with no pelvic mass or active inflammation. Small shotty lymph nodes along the periaortic region which are more prominent. Recommend follow-up 2 cm abdominal wall hernia which is unchanged. ED COURSE/MDM  Patient seen and evaluated. Old records reviewed. Diagnostic testing reviewed and results discussed. I have independently evaluated this patient based upon my scope of practice. Supervising physician was in the department for consultation as needed. Amanda Del Valle presented to the ED today with above noted complaints. Blood work CBC and CMP shows no acute kidney injury, leukocytosis or bandemia. Lactic acid 1.4. Urinalysis reviewed from outpatient earlier today which showed nitrite and leukocyte positive and 21-50 WBCs on microscopic. Urine culture pending. CT of the abdomen pelvis shows urethral stent in good position multiple renal stones unchanged mild hydronephrosis and hydroureter on the left which is less prominent in the previous stone seen in the distal left ureter may be present within the ureter distally but not well visualized.   They also note small subcapsular fluid collection along the anterior aspect of the left kidney which may have been present previously and is more hypodense now represents a liver resolving subcapsular hematoma versus fluid. Discussed the above findings with the urologist on-call  who feels comfortable with patient going home on oral antibiotics and pain medication and close outpatient follow-up    Patient received toradol for pain, with good relief. While in ED patient received   Medications   0.9 % sodium chloride bolus (0 mLs IntraVENous Stopped 10/15/21 2103)   cefTRIAXone (ROCEPHIN) 1000 mg IVPB in 50 mL D5W minibag (0 mg IntraVENous Stopped 10/15/21 2103)   iopamidol (ISOVUE-370) 76 % injection 75 mL (75 mLs IntraVENous Given 10/15/21 2003)   ketorolac (TORADOL) injection 30 mg (30 mg IntraVENous Given 10/15/21 2149)             At this point I do not feel the patient requires further work up and it is reasonable to discharge the patient. A discussion was had with the patient and/or their surrogate regarding diagnosis, diagnostic testing results, treatment/ plan of care, and follow up. There was shared decision-making between myself as well as the patient and/or their surrogate and we are all in agreement with discharge home. There was an opportunity for questions and all questions were answered to the best of my ability and to the satisfaction of the patient and/or patient family. Patient will follow up with urology for further evaluation/treatment. The patient was given strict return precautions as we discussed symptoms that would necessitate return to the ED. Patient will return to ED for new/worsening symptoms. The patient verbalized their understanding and agreement with the above plan. Please refer to AVS for further details regarding discharge instructions.       Results for orders placed or performed during the hospital encounter of 10/15/21   Lactic acid, plasma   Result Value Ref Range    Lactic Acid 1.4 0.4 - 2.0 mmol/L   CBC auto differential Result Value Ref Range    WBC 9.0 4.0 - 11.0 K/uL    RBC 3.91 (L) 4.00 - 5.20 M/uL    Hemoglobin 13.2 12.0 - 16.0 g/dL    Hematocrit 38.3 36.0 - 48.0 %    MCV 98.1 80.0 - 100.0 fL    MCH 33.9 26.0 - 34.0 pg    MCHC 34.6 31.0 - 36.0 g/dL    RDW 13.3 12.4 - 15.4 %    Platelets 869 605 - 801 K/uL    MPV 8.5 5.0 - 10.5 fL    Neutrophils % 72.0 %    Lymphocytes % 18.7 %    Monocytes % 7.1 %    Eosinophils % 1.4 %    Basophils % 0.8 %    Neutrophils Absolute 6.5 1.7 - 7.7 K/uL    Lymphocytes Absolute 1.7 1.0 - 5.1 K/uL    Monocytes Absolute 0.6 0.0 - 1.3 K/uL    Eosinophils Absolute 0.1 0.0 - 0.6 K/uL    Basophils Absolute 0.1 0.0 - 0.2 K/uL   Comprehensive metabolic panel   Result Value Ref Range    Sodium 139 136 - 145 mmol/L    Potassium 4.1 3.5 - 5.1 mmol/L    Chloride 104 99 - 110 mmol/L    CO2 27 21 - 32 mmol/L    Anion Gap 8 3 - 16    Glucose 109 (H) 70 - 99 mg/dL    BUN 14 7 - 20 mg/dL    CREATININE 1.0 0.6 - 1.1 mg/dL    GFR Non-African American >60 >60    GFR African American >60 >60    Calcium 10.0 8.3 - 10.6 mg/dL    Total Protein 7.0 6.4 - 8.2 g/dL    Albumin 4.3 3.4 - 5.0 g/dL    Albumin/Globulin Ratio 1.6 1.1 - 2.2    Total Bilirubin 0.4 0.0 - 1.0 mg/dL    Alkaline Phosphatase 109 40 - 129 U/L    ALT 10 10 - 40 U/L    AST 14 (L) 15 - 37 U/L    Globulin 2.7 Not Established g/dL   HCG Qualitative, Serum   Result Value Ref Range    hCG Qual Negative Detects HCG level >10 MIU/mL       I estimate there is LOW risk for ACUTE APPENDICITIS, BOWEL OBSTRUCTION, CHOLECYSTITIS, DIVERTICULITIS, INCARCERATED HERNIA, PANCREATITIS, PELVIC INFLAMMATORY DISEASE, PERFORATED BOWEL or ULCER, PREGNANCY, or TUBO-OVARIAN ABSCESS, thus I consider the discharge disposition reasonable. Also, there is no evidence or peritonitis, sepsis, or toxicity. Davin Peters and I have discussed the diagnosis and risks, and we agree with discharging home to follow-up with their primary doctor.  We also discussed returning to the Emergency Department immediately if new or worsening symptoms occur. We have discussed the symptoms which are most concerning (e.g., bloody stool, fever, changing or worsening pain, vomiting) that necessitate immediate return. Final Impression    1. Left flank pain    2. Renal colic    3. Ureterolithiasis    4. Acute cystitis with hematuria        Blood pressure 110/75, pulse 98, temperature 99 °F (37.2 °C), temperature source Oral, resp. rate 14, height 5' 2\" (1.575 m), weight 170 lb (77.1 kg), SpO2 98 %, not currently breastfeeding.mdm    Patient was sent home with a prescription for below medication/s. I did [place] patient on appropriate use of these medication. Discharge Medication List as of 10/15/2021 10:17 PM      START taking these medications    Details   cefUROXime (CEFTIN) 250 MG tablet Take 1 tablet by mouth 2 times daily for 10 days, Disp-20 tablet, R-0Normal      phenazopyridine (PYRIDIUM) 100 MG tablet Take 1 tablet by mouth 3 times daily as needed for Pain, Disp-20 tablet, R-0Normal      !! ketorolac (TORADOL) 10 MG tablet Take 1 tablet by mouth every 6 hours as needed for Pain, Disp-20 tablet, R-0Normal       !! - Potential duplicate medications found. Please discuss with provider. FOLLOW UP  MD Wilmer Lau [facility]  [facility] [address] [place] [address]  [phone]    Call   follow up    [facility]  ED  [facility] [address] [phone]  Go to   As needed, If symptoms worsen      DISPOSITION  Patient was discharged to home in good condition. Comment: Please note this report has been produced using speech recognition software and may contain errors related to that system including errors in grammar, punctuation, and spelling, as well as words and phrases that may be inappropriate. If there are any questions or concerns please feel free to contact the dictating provider for clarification.             Catrachito Soto, NILES - CNP  10/16/21 0015

## 2021-10-16 NOTE — ED NOTES
Removed PIV, reviewed discharge paperwork and reviewed follow up and prescriptions. Pt and SO verbalized understanding.        Ciera Beltre RN  10/15/21 9312

## 2021-10-17 LAB
ORGANISM: ABNORMAL
URINE CULTURE, ROUTINE: ABNORMAL

## 2021-10-17 NOTE — ED NOTES
Urine culture reviewed by Dr Nuria Tabor. Rx for Bactrim ds 1 po bid x 7 days called to [facility] [phone].      Thao Downey RN  10/17/21 4615

## 2022-03-24 ENCOUNTER — NURSE TRIAGE (OUTPATIENT)
Dept: OTHER | Facility: CLINIC | Age: 42
End: 2022-03-24

## 2022-03-24 NOTE — TELEPHONE ENCOUNTER
Received call from Odette Alejandro [phone] at [facility] with Red Flag Complaint. Subjective: Caller states \"I feel like I am having issues with my kidneys. It maybe a stone. I also think I have a hernia. \"     Current Symptoms:   Onset 1 month  Fatigue- no energy  Cramping to calfs    Onset 2 days ago  Left side cramping pain-intermittent  Lightheaded when stand up and the other time with deep breath  Ear pressure    Onset 6-7 months  Feel something protuding  Above the navel to right side  Denies pain to touch  Uncomfortable if pushed on it    Hx of renal stones,      Onset: see above   Associated Symptoms: increased sleepiness    Pain Severity: 3/10; squeezing; intermittent    Temperature: denies    What has been tried: nothing    LMP: \"post menopausal\" LMP 2015 Pregnant: NA    Recommended disposition: See PCP within 3 Days    Care advice provided, patient verbalizes understanding; denies any other questions or concerns; instructed to call back for any new or worsening symptoms. Patient/Caller agrees with recommended disposition; writer provided warm transfer to Rehan Benavidez at [facility] for appointment scheduling     Attention Provider: Thank you for allowing me to participate in the care of your patient. The patient was connected to triage in response to information provided to the ECC/PSC. Please do not respond through this encounter as the response is not directed to a shared pool.       Reason for Disposition   [1] Fatigue (i.e., tires easily, decreased energy) AND [2] persists > 1 week    Protocols used: WEAKNESS (GENERALIZED) AND FATIGUE-ADULT-

## 2022-03-25 ENCOUNTER — TELEPHONE (OUTPATIENT)
Dept: FAMILY MEDICINE CLINIC | Age: 42
End: 2022-03-25

## 2022-03-25 NOTE — TELEPHONE ENCOUNTER
Called patient regarding her appointment at [phone] 3/25/22. Regarding symptoms, provider wants to see her in office for evaluation, not virtual. Patient states she is unable to make it office today, so scheduled her for first available on Monday 3/28/22. Instructed to call if any changes or symptoms worsen.

## 2022-03-28 ENCOUNTER — HOSPITAL ENCOUNTER (OUTPATIENT)
Dept: GENERAL RADIOLOGY | Age: 42
Discharge: HOME | End: 2022-03-28
Payer: COMMERCIAL

## 2022-03-28 ENCOUNTER — HOSPITAL ENCOUNTER (OUTPATIENT)
Age: 42
Discharge: HOME | End: 2022-03-28
Payer: COMMERCIAL

## 2022-03-28 ENCOUNTER — OFFICE VISIT (OUTPATIENT)
Dept: FAMILY MEDICINE CLINIC | Age: 42
End: 2022-03-28
Payer: COMMERCIAL

## 2022-03-28 VITALS
HEIGHT: 62 IN | SYSTOLIC BLOOD PRESSURE: 112 MMHG | HEART RATE: 97 BPM | TEMPERATURE: 98.2 F | DIASTOLIC BLOOD PRESSURE: 62 MMHG | WEIGHT: 181 LBS | OXYGEN SATURATION: 97 % | BODY MASS INDEX: 33.31 KG/M2

## 2022-03-28 DIAGNOSIS — R68.82 DECREASED LIBIDO: ICD-10-CM

## 2022-03-28 DIAGNOSIS — R53.83 FATIGUE, UNSPECIFIED TYPE: ICD-10-CM

## 2022-03-28 DIAGNOSIS — R42 DIZZINESS: ICD-10-CM

## 2022-03-28 DIAGNOSIS — R63.5 WEIGHT GAIN: ICD-10-CM

## 2022-03-28 DIAGNOSIS — N20.0 RECURRENT NEPHROLITHIASIS: ICD-10-CM

## 2022-03-28 DIAGNOSIS — M25.50 POLYARTHRALGIA: ICD-10-CM

## 2022-03-28 DIAGNOSIS — R31.9 HEMATURIA, UNSPECIFIED TYPE: ICD-10-CM

## 2022-03-28 DIAGNOSIS — R25.2 LEG CRAMPING: Primary | ICD-10-CM

## 2022-03-28 DIAGNOSIS — R25.2 LEG CRAMPING: ICD-10-CM

## 2022-03-28 LAB
BILIRUBIN, POC: NEGATIVE
BLOOD URINE, POC: ABNORMAL
CLARITY, POC: ABNORMAL
COLOR, POC: YELLOW
GLUCOSE URINE, POC: NEGATIVE
KETONES, POC: NEGATIVE
LEUKOCYTE EST, POC: ABNORMAL
NITRITE, POC: NEGATIVE
PH, POC: 5.5
PROTEIN, POC: NEGATIVE
SPECIFIC GRAVITY, POC: >=1.03
UROBILINOGEN, POC: ABNORMAL

## 2022-03-28 PROCEDURE — G8417 CALC BMI ABV UP PARAM F/U: HCPCS | Performed by: PHYSICIAN ASSISTANT

## 2022-03-28 PROCEDURE — G8484 FLU IMMUNIZE NO ADMIN: HCPCS | Performed by: PHYSICIAN ASSISTANT

## 2022-03-28 PROCEDURE — 81002 URINALYSIS NONAUTO W/O SCOPE: CPT | Performed by: PHYSICIAN ASSISTANT

## 2022-03-28 PROCEDURE — 74018 RADEX ABDOMEN 1 VIEW: CPT

## 2022-03-28 PROCEDURE — G8427 DOCREV CUR MEDS BY ELIG CLIN: HCPCS | Performed by: PHYSICIAN ASSISTANT

## 2022-03-28 PROCEDURE — 99214 OFFICE O/P EST MOD 30 MIN: CPT | Performed by: PHYSICIAN ASSISTANT

## 2022-03-28 PROCEDURE — 4004F PT TOBACCO SCREEN RCVD TLK: CPT | Performed by: PHYSICIAN ASSISTANT

## 2022-03-28 RX ORDER — BUSPIRONE HYDROCHLORIDE 30 MG/1
30 TABLET ORAL 2 TIMES DAILY
COMMUNITY

## 2022-03-28 SDOH — ECONOMIC STABILITY: HOUSING INSECURITY: IN THE LAST 12 MONTHS, HOW MANY PLACES HAVE YOU LIVED?: 1

## 2022-03-28 SDOH — ECONOMIC STABILITY: TRANSPORTATION INSECURITY
IN THE PAST 12 MONTHS, HAS THE LACK OF TRANSPORTATION KEPT YOU FROM MEDICAL APPOINTMENTS OR FROM GETTING MEDICATIONS?: NO

## 2022-03-28 SDOH — ECONOMIC STABILITY: FOOD INSECURITY: WITHIN THE PAST 12 MONTHS, THE FOOD YOU BOUGHT JUST DIDN'T LAST AND YOU DIDN'T HAVE MONEY TO GET MORE.: NEVER TRUE

## 2022-03-28 SDOH — ECONOMIC STABILITY: INCOME INSECURITY: IN THE LAST 12 MONTHS, WAS THERE A TIME WHEN YOU WERE NOT ABLE TO PAY THE MORTGAGE OR RENT ON TIME?: NO

## 2022-03-28 SDOH — ECONOMIC STABILITY: HOUSING INSECURITY
IN THE LAST 12 MONTHS, WAS THERE A TIME WHEN YOU DID NOT HAVE A STEADY PLACE TO SLEEP OR SLEPT IN A SHELTER (INCLUDING NOW)?: NO

## 2022-03-28 SDOH — ECONOMIC STABILITY: FOOD INSECURITY: WITHIN THE PAST 12 MONTHS, YOU WORRIED THAT YOUR FOOD WOULD RUN OUT BEFORE YOU GOT MONEY TO BUY MORE.: NEVER TRUE

## 2022-03-28 ASSESSMENT — PATIENT HEALTH QUESTIONNAIRE - PHQ9
1. LITTLE INTEREST OR PLEASURE IN DOING THINGS: 3
5. POOR APPETITE OR OVEREATING: 3
4. FEELING TIRED OR HAVING LITTLE ENERGY: 3
2. FEELING DOWN, DEPRESSED OR HOPELESS: 3
SUM OF ALL RESPONSES TO PHQ QUESTIONS 1-9: 21
10. IF YOU CHECKED OFF ANY PROBLEMS, HOW DIFFICULT HAVE THESE PROBLEMS MADE IT FOR YOU TO DO YOUR WORK, TAKE CARE OF THINGS AT HOME, OR GET ALONG WITH OTHER PEOPLE: 2
SUM OF ALL RESPONSES TO PHQ QUESTIONS 1-9: 21
6. FEELING BAD ABOUT YOURSELF - OR THAT YOU ARE A FAILURE OR HAVE LET YOURSELF OR YOUR FAMILY DOWN: 3
9. THOUGHTS THAT YOU WOULD BE BETTER OFF DEAD, OR OF HURTING YOURSELF: 0
3. TROUBLE FALLING OR STAYING ASLEEP: 3
SUM OF ALL RESPONSES TO PHQ QUESTIONS 1-9: 21
SUM OF ALL RESPONSES TO PHQ QUESTIONS 1-9: 21
8. MOVING OR SPEAKING SO SLOWLY THAT OTHER PEOPLE COULD HAVE NOTICED. OR THE OPPOSITE, BEING SO FIGETY OR RESTLESS THAT YOU HAVE BEEN MOVING AROUND A LOT MORE THAN USUAL: 0
SUM OF ALL RESPONSES TO PHQ9 QUESTIONS 1 & 2: 6
7. TROUBLE CONCENTRATING ON THINGS, SUCH AS READING THE NEWSPAPER OR WATCHING TELEVISION: 3

## 2022-03-28 ASSESSMENT — ENCOUNTER SYMPTOMS
DIARRHEA: 0
SHORTNESS OF BREATH: 0
COUGH: 0
VOMITING: 0
NAUSEA: 0
RHINORRHEA: 0
ABDOMINAL PAIN: 1
CONSTIPATION: 0
SORE THROAT: 0

## 2022-03-28 ASSESSMENT — COLUMBIA-SUICIDE SEVERITY RATING SCALE - C-SSRS
1. WITHIN THE PAST MONTH, HAVE YOU WISHED YOU WERE DEAD OR WISHED YOU COULD GO TO SLEEP AND NOT WAKE UP?: NO
6. HAVE YOU EVER DONE ANYTHING, STARTED TO DO ANYTHING, OR PREPARED TO DO ANYTHING TO END YOUR LIFE?: NO
2. HAVE YOU ACTUALLY HAD ANY THOUGHTS OF KILLING YOURSELF?: NO

## 2022-03-28 ASSESSMENT — SOCIAL DETERMINANTS OF HEALTH (SDOH): HOW HARD IS IT FOR YOU TO PAY FOR THE VERY BASICS LIKE FOOD, HOUSING, MEDICAL CARE, AND HEATING?: NOT HARD AT ALL

## 2022-03-28 NOTE — PROGRESS NOTES
3/28/2022  Will Lout (: 1980)  35 y.o.    ASSESSMENT and PLAN:  Tonya Sheffield was seen today for leg pain and chronic kidney disease. Diagnoses and all orders for this visit:    Leg cramping  -     Magnesium; Future    Polyarthralgia  -     RHEUMATOID FACTOR; Future  -     CHAPARRITA Reflex to Antibody Cascade; Future    Fatigue, unspecified type  -     CBC with Auto Differential; Future  -     Comprehensive Metabolic Panel; Future  -     Vitamin D 25 Hydroxy; Future  -     Vitamin B12; Future  -     TSH with Reflex; Future    Weight gain  -     TSH with Reflex; Future    Recurrent nephrolithiasis  -     CBC with Auto Differential; Future  -     Comprehensive Metabolic Panel; Future  -     XR ABDOMEN (KUB) (SINGLE AP VIEW); Future    Hematuria, unspecified type  -     MICROSCOPIC URINALYSIS  -     Culture, Urine  -     XR ABDOMEN (KUB) (SINGLE AP VIEW); Future  -     POCT Urinalysis no Micro    Decreased libido  -     Follicle Stimulating Hormone; Future    Dizziness      Multiple acute complaints today. UA, culture, and kub to rule out stones and infection. Labs per orders. If all wnl recommend follow up with cardiology re-stress test and psychiatry for depression management. Return if symptoms worsen or fail to improve. HPI    Multiple acute complaints today. Patient with history of recurrent kidney stones- follows with urology. Last ct 10/2021 with   ureteral stent on the left which is in   good position with multiple renal stones on the left which are unchanged. Small renal stone on the right with no hydronephrosis. Patient reports that she has whole body pain. Feels stiff. Has been happening for the last 6 months. Has bilateral calf pain. Not associated with redness or swelling of the calves. Denies chest pain, shortness of breath. Lightheadedness for the last 2 weeks   Feels like she gets dizzy with positional changes or deep breathing.      Worsening depression   Follows with psychiatry   Currently on vyvanse, seroquel, buspar,   Has been eating in the middle of the night. Weight gain as well- started on metformin. Has been seen by cardiology- they ordered stress test which she has not yet completed. Also requests hormone levels be checked. Review of Systems   Constitutional: Positive for activity change and fatigue. Negative for chills and fever. HENT: Negative for congestion, ear pain, rhinorrhea and sore throat. Eyes: Negative for visual disturbance. Respiratory: Negative for cough and shortness of breath. Cardiovascular: Negative for chest pain and palpitations. Gastrointestinal: Positive for abdominal pain. Negative for constipation, diarrhea, nausea and vomiting. Genitourinary: Positive for flank pain and frequency. Negative for difficulty urinating and dysuria. Musculoskeletal: Positive for arthralgias and myalgias. Skin: Negative for rash. Neurological: Negative for dizziness, weakness and numbness. Psychiatric/Behavioral: Positive for dysphoric mood. Negative for sleep disturbance. The patient is nervous/anxious. Allergies, past medical history, family history, and social history reviewed and unchanged from previous encounter. Current Outpatient Medications   Medication Sig Dispense Refill    nicotine (NICODERM CQ) 21 MG/24HR Place 1 patch onto the skin daily 30 patch 3    metFORMIN (GLUCOPHAGE) 500 MG tablet Take 500 mg by mouth 2 times daily (with meals)      Lisdexamfetamine Dimesylate (VYVANSE) 40 MG CAPS Take 40 mg by mouth daily.  ibuprofen (IBU) 600 MG tablet Take 1 tablet by mouth every 6 hours as needed for Pain 30 tablet 0    acetaminophen (APAP EXTRA STRENGTH) 500 MG tablet Take 2 tablets by mouth every 6 hours as needed for Pain 40 tablet 0    ondansetron (ZOFRAN ODT) 4 MG disintegrating tablet Take 1 tablet by mouth every 8 hours as needed for Nausea Let dissolve in mouth.  10 tablet 0    omeprazole (PRILOSEC) 40 MG delayed release capsule Take 1 capsule by mouth every morning (before breakfast) 90 capsule 1    diphenhydrAMINE (BENADRYL) 50 MG capsule Take 50 mg by mouth nightly as needed for Sleep      albuterol sulfate  (90 Base) MCG/ACT inhaler Inhale 2 puffs into the lungs every 4 hours as needed for Wheezing May Sub as needed per insurance. 1 Inhaler 5    buprenorphine-naloxone (SUBOXONE) 8-2 MG SUBL SL tablet Place 1 tablet under the tongue daily. Pt takes 12 mg daily      QUEtiapine (SEROQUEL XR) 150 MG TB24 extended release tablet Take 150 mg by mouth nightly      busPIRone (BUSPAR) 30 MG tablet Take 30 mg by mouth 2 times daily      tamsulosin (FLOMAX) 0.4 MG capsule Take 1 capsule by mouth daily for 7 doses 7 capsule 0    oxybutynin (DITROPAN) 5 MG tablet Take 5 mg by mouth 3 times daily (Patient not taking: Reported on 3/28/2022)      VRAYLAR 1.5 MG capsule Take 1 capsule by mouth daily (Patient not taking: Reported on 3/28/2022)       No current facility-administered medications for this visit. Facility-Administered Medications Ordered in Other Visits   Medication Dose Route Frequency Provider Last Rate Last Admin    lactated ringers infusion   IntraVENous Continuous Emi Torres MD   New Bag at 10/01/21 1349    sodium chloride flush 0.9 % injection 10 mL  10 mL IntraVENous 2 times per day Emi Torres MD        sodium chloride flush 0.9 % injection 10 mL  10 mL IntraVENous PRN Emi Torres MD           Vitals:    03/28/22 1407   BP: 112/62   Site: Right Upper Arm   Position: Sitting   Cuff Size: Medium Adult   Pulse: 97   Temp: 98.2 °F (36.8 °C)   TempSrc: Oral   SpO2: 97%   Weight: 181 lb (82.1 kg)   Height: 5' 1.75\" (1.568 m)     Estimated body mass index is 33.37 kg/m² as calculated from the following:    Height as of this encounter: 5' 1.75\" (1.568 m). Weight as of this encounter: 181 lb (82.1 kg).     Physical Exam  Constitutional:       General: She is not in acute distress. Appearance: She is well-developed. HENT:      Head: Normocephalic and atraumatic. Eyes:      Conjunctiva/sclera: Conjunctivae normal.      Pupils: Pupils are equal, round, and reactive to light. Cardiovascular:      Rate and Rhythm: Normal rate and regular rhythm. Heart sounds: Normal heart sounds. No murmur heard. Pulmonary:      Effort: Pulmonary effort is normal.      Breath sounds: Normal breath sounds. No wheezing. Abdominal:      General: Bowel sounds are normal.      Palpations: Abdomen is soft. Tenderness: There is abdominal tenderness in the epigastric area and suprapubic area. There is no right CVA tenderness or left CVA tenderness. Musculoskeletal:      Cervical back: Neck supple. Lymphadenopathy:      Cervical: No cervical adenopathy. Skin:     General: Skin is warm and dry. Findings: No rash. Neurological:      Mental Status: She is alert and oriented to person, place, and time. Deep Tendon Reflexes: Reflexes are normal and symmetric.

## 2022-03-29 LAB
A/G RATIO: 1.5 (ref 1.1–2.2)
ALBUMIN SERPL-MCNC: 4.2 G/DL (ref 3.4–5)
ALP BLD-CCNC: 119 U/L (ref 40–129)
ALT SERPL-CCNC: 10 U/L (ref 10–40)
AMORPHOUS: ABNORMAL /HPF
ANION GAP SERPL CALCULATED.3IONS-SCNC: 14 MMOL/L (ref 3–16)
ANTI-NUCLEAR ANTIBODY (ANA): NEGATIVE
AST SERPL-CCNC: 17 U/L (ref 15–37)
BASOPHILS ABSOLUTE: 0.1 K/UL (ref 0–0.2)
BASOPHILS RELATIVE PERCENT: 0.9 %
BILIRUB SERPL-MCNC: <0.2 MG/DL (ref 0–1)
BUN BLDV-MCNC: 25 MG/DL (ref 7–20)
CALCIUM SERPL-MCNC: 10 MG/DL (ref 8.3–10.6)
CHLORIDE BLD-SCNC: 102 MMOL/L (ref 99–110)
CO2: 22 MMOL/L (ref 21–32)
COMMENT UA: ABNORMAL
CREAT SERPL-MCNC: 1.2 MG/DL (ref 0.6–1.1)
CRYSTALS, UA: ABNORMAL /HPF
EOSINOPHILS ABSOLUTE: 0.2 K/UL (ref 0–0.6)
EOSINOPHILS RELATIVE PERCENT: 2.6 %
EPITHELIAL CELLS, UA: 1 /HPF (ref 0–5)
FOLLICLE STIMULATING HORMONE: 89.2 MIU/ML
GFR AFRICAN AMERICAN: 59
GFR NON-AFRICAN AMERICAN: 49
GLUCOSE BLD-MCNC: 97 MG/DL (ref 70–99)
HCT VFR BLD CALC: 41.3 % (ref 36–48)
HEMOGLOBIN: 14 G/DL (ref 12–16)
HYALINE CASTS: 15 /LPF (ref 0–8)
LYMPHOCYTES ABSOLUTE: 2.2 K/UL (ref 1–5.1)
LYMPHOCYTES RELATIVE PERCENT: 34.2 %
MAGNESIUM: 1.8 MG/DL (ref 1.8–2.4)
MCH RBC QN AUTO: 33 PG (ref 26–34)
MCHC RBC AUTO-ENTMCNC: 33.9 G/DL (ref 31–36)
MCV RBC AUTO: 97.3 FL (ref 80–100)
MONOCYTES ABSOLUTE: 0.3 K/UL (ref 0–1.3)
MONOCYTES RELATIVE PERCENT: 4.8 %
NEUTROPHILS ABSOLUTE: 3.8 K/UL (ref 1.7–7.7)
NEUTROPHILS RELATIVE PERCENT: 57.5 %
PDW BLD-RTO: 13.6 % (ref 12.4–15.4)
PLATELET # BLD: 219 K/UL (ref 135–450)
PMV BLD AUTO: 9.7 FL (ref 5–10.5)
POTASSIUM SERPL-SCNC: 4.5 MMOL/L (ref 3.5–5.1)
RBC # BLD: 4.24 M/UL (ref 4–5.2)
RBC UA: 4 /HPF (ref 0–4)
RHEUMATOID FACTOR: <10 IU/ML
SODIUM BLD-SCNC: 138 MMOL/L (ref 136–145)
TOTAL PROTEIN: 7 G/DL (ref 6.4–8.2)
TSH REFLEX: 1.5 UIU/ML (ref 0.27–4.2)
URINE CULTURE, ROUTINE: NORMAL
URINE TYPE: ABNORMAL
VITAMIN B-12: 284 PG/ML (ref 211–911)
VITAMIN D 25-HYDROXY: 39.1 NG/ML
WBC # BLD: 6.6 K/UL (ref 4–11)
WBC UA: 68 /HPF (ref 0–5)
YEAST: PRESENT /HPF

## 2022-03-31 ENCOUNTER — TELEPHONE (OUTPATIENT)
Dept: FAMILY MEDICINE CLINIC | Age: 42
End: 2022-03-31

## 2022-03-31 DIAGNOSIS — N28.9 RENAL INSUFFICIENCY: Primary | ICD-10-CM

## 2022-03-31 NOTE — TELEPHONE ENCOUNTER
Blood counts are normal   Labs checking for autoimmune causes of arthritis are negative - rheumatoid and lupus  Vitamin d and b12 and thyroid levels are normal as well. Kidney function is decreased from last check- I recommend stopping NSAIDS aleve, ibuprofen motrin, advil and increasing hydration - we can recheck with labs in 2 weeks. Xray of the abd/pelvis did not show any stones. Microscopic urinalysis did show calcium oxalate deposits- because of this, I would follow up with your urologist.   Hormone levels indicate that you are in the postmenopausal state which could be contributing to hot flashes and fatigue.

## 2022-04-01 NOTE — TELEPHONE ENCOUNTER
Patient states her fatigue is very bad, is there something she can take to help with this, please advise.

## 2022-04-04 NOTE — TELEPHONE ENCOUNTER
Given that we addressed so much at her last appointment, lets get an acute visit scheduled, virtual or office visit so we can discuss options further.

## 2022-04-07 ENCOUNTER — OFFICE VISIT (OUTPATIENT)
Dept: FAMILY MEDICINE CLINIC | Age: 42
End: 2022-04-07
Payer: COMMERCIAL

## 2022-04-07 VITALS
DIASTOLIC BLOOD PRESSURE: 58 MMHG | HEART RATE: 103 BPM | BODY MASS INDEX: 33.37 KG/M2 | OXYGEN SATURATION: 100 % | WEIGHT: 181 LBS | SYSTOLIC BLOOD PRESSURE: 98 MMHG

## 2022-04-07 DIAGNOSIS — N95.1 POST MENOPAUSAL SYNDROME: Primary | ICD-10-CM

## 2022-04-07 PROCEDURE — G8427 DOCREV CUR MEDS BY ELIG CLIN: HCPCS | Performed by: PHYSICIAN ASSISTANT

## 2022-04-07 PROCEDURE — 4004F PT TOBACCO SCREEN RCVD TLK: CPT | Performed by: PHYSICIAN ASSISTANT

## 2022-04-07 PROCEDURE — G8417 CALC BMI ABV UP PARAM F/U: HCPCS | Performed by: PHYSICIAN ASSISTANT

## 2022-04-07 PROCEDURE — 99213 OFFICE O/P EST LOW 20 MIN: CPT | Performed by: PHYSICIAN ASSISTANT

## 2022-04-07 RX ORDER — VENLAFAXINE HYDROCHLORIDE 37.5 MG/1
CAPSULE, EXTENDED RELEASE ORAL
Qty: 60 CAPSULE | Refills: 2 | Status: SHIPPED | OUTPATIENT
Start: 2022-04-07 | End: 2022-07-05

## 2022-04-07 NOTE — PROGRESS NOTES
2022  Beth Jasminedock (: 1980)  35 y.o.    ASSESSMENT and PLAN:  Sydni Gonzáles was seen today for fatigue and results. Diagnoses and all orders for this visit:    Post menopausal syndrome  -     venlafaxine (EFFEXOR XR) 37.5 MG extended release capsule; Take 1 capsule by mouth daily for one week then increase to 2 capsules daily. - trial effexor. Patient to touch base with her psychiatrist regarding the addition of this medication. Recent microscopic UA with ca ox. Recommended she follow up with her urologist.     No follow-ups on file. HPI  Patient presents for evaluation of fatigue  Acutely worsened over the last few months. Recent labs completed. She is postmenopausal, vit d, b12, tsh, and cbc wnl. Having hot flashes. Sleeping ok  Normal appetite. Review of Systems   Constitutional: Positive for fatigue. Negative for activity change, chills and fever. HENT: Negative for congestion, ear pain, rhinorrhea and sore throat. Eyes: Negative for visual disturbance. Respiratory: Negative for cough and shortness of breath. Cardiovascular: Negative for chest pain and palpitations. Gastrointestinal: Negative for abdominal pain, constipation, diarrhea, nausea and vomiting. Genitourinary: Negative for difficulty urinating and dysuria. Musculoskeletal: Negative for arthralgias and myalgias. Skin: Negative for rash. Neurological: Negative for dizziness, weakness and numbness. Psychiatric/Behavioral: Negative for sleep disturbance. Allergies, past medical history, family history, and social history reviewed and unchanged from previous encounter. Current Outpatient Medications   Medication Sig Dispense Refill    venlafaxine (EFFEXOR XR) 37.5 MG extended release capsule Take 1 capsule by mouth daily for one week then increase to 2 capsules daily.  60 capsule 2    busPIRone (BUSPAR) 30 MG tablet Take 30 mg by mouth 2 times daily      nicotine (NICODERM CQ) 21 MG/24HR Place 1 patch onto the skin daily 30 patch 3    metFORMIN (GLUCOPHAGE) 500 MG tablet Take 500 mg by mouth 2 times daily (with meals)      Lisdexamfetamine Dimesylate (VYVANSE) 40 MG CAPS Take 40 mg by mouth daily.  ibuprofen (IBU) 600 MG tablet Take 1 tablet by mouth every 6 hours as needed for Pain 30 tablet 0    acetaminophen (APAP EXTRA STRENGTH) 500 MG tablet Take 2 tablets by mouth every 6 hours as needed for Pain 40 tablet 0    ondansetron (ZOFRAN ODT) 4 MG disintegrating tablet Take 1 tablet by mouth every 8 hours as needed for Nausea Let dissolve in mouth. 10 tablet 0    omeprazole (PRILOSEC) 40 MG delayed release capsule Take 1 capsule by mouth every morning (before breakfast) 90 capsule 1    diphenhydrAMINE (BENADRYL) 50 MG capsule Take 50 mg by mouth nightly as needed for Sleep      albuterol sulfate  (90 Base) MCG/ACT inhaler Inhale 2 puffs into the lungs every 4 hours as needed for Wheezing May Sub as needed per insurance. 1 Inhaler 5    buprenorphine-naloxone (SUBOXONE) 8-2 MG SUBL SL tablet Place 1 tablet under the tongue daily. Pt takes 12 mg daily      QUEtiapine (SEROQUEL XR) 150 MG TB24 extended release tablet Take 150 mg by mouth nightly       No current facility-administered medications for this visit.      Facility-Administered Medications Ordered in Other Visits   Medication Dose Route Frequency Provider Last Rate Last Admin    lactated ringers infusion   IntraVENous Continuous Elijah Ríos MD   New Bag at 10/01/21 1349    sodium chloride flush 0.9 % injection 10 mL  10 mL IntraVENous 2 times per day Elijah Ríos MD        sodium chloride flush 0.9 % injection 10 mL  10 mL IntraVENous PRN Elijah Ríos MD           Vitals:    04/07/22 1003   BP: (!) 98/58   Site: Right Upper Arm   Position: Sitting   Cuff Size: Medium Adult   Pulse: 103   SpO2: 100%   Weight: 181 lb (82.1 kg)     Estimated body mass index is 33.37 kg/m² as calculated from the following:    Height as of 3/28/22: 5' 1.75\" (1.568 m). Weight as of this encounter: 181 lb (82.1 kg). Physical Exam  Constitutional:       General: She is not in acute distress. Appearance: She is well-developed. HENT:      Head: Normocephalic and atraumatic. Eyes:      Conjunctiva/sclera: Conjunctivae normal.      Pupils: Pupils are equal, round, and reactive to light. Cardiovascular:      Rate and Rhythm: Normal rate and regular rhythm. Heart sounds: Normal heart sounds. No murmur heard. Pulmonary:      Effort: Pulmonary effort is normal.      Breath sounds: Normal breath sounds. No wheezing. Musculoskeletal:      Cervical back: Neck supple. Lymphadenopathy:      Cervical: No cervical adenopathy. Skin:     General: Skin is warm and dry. Findings: No rash. Neurological:      Mental Status: She is alert and oriented to person, place, and time.

## 2022-04-08 ASSESSMENT — ENCOUNTER SYMPTOMS
COUGH: 0
VOMITING: 0
SORE THROAT: 0
SHORTNESS OF BREATH: 0
DIARRHEA: 0
ABDOMINAL PAIN: 0
RHINORRHEA: 0
CONSTIPATION: 0
NAUSEA: 0

## 2022-07-04 DIAGNOSIS — N95.1 POST MENOPAUSAL SYNDROME: ICD-10-CM

## 2022-07-05 ENCOUNTER — TELEPHONE (OUTPATIENT)
Dept: FAMILY MEDICINE CLINIC | Age: 42
End: 2022-07-05

## 2022-07-05 RX ORDER — VENLAFAXINE HYDROCHLORIDE 37.5 MG/1
CAPSULE, EXTENDED RELEASE ORAL
Qty: 60 CAPSULE | Refills: 2 | Status: SHIPPED | OUTPATIENT
Start: 2022-07-05

## 2022-07-05 NOTE — LETTER
[address]  [facility] [address]  Phone: [phone]  Fax: [phone]    Ivan Mandujano        July 18, 2022      Dear Олег Chiu,     [address] [place] [address] office has made multiple attempts to contact you regarding a medication. Your pharmacy had reached out to our office regarding EFFEXOR, and we need to clarify if you are on a tapering dose or if you are taking full 75mg dose of effexor and if this has been discussed/approved with your psychiatrist.    We will need to clarify with you before additional medication can be prescribed.       Sincerely,    Ness PATEL

## 2022-07-06 NOTE — TELEPHONE ENCOUNTER
Can we please call the patient and see if she is on the full 75 mg dose of effexor and if this was okay with her psychiatrist, per Marlene's last note?  Thank you

## 2022-07-19 RX ORDER — ALBUTEROL SULFATE 90 UG/1
AEROSOL, METERED RESPIRATORY (INHALATION)
Qty: 18 G | Refills: 0 | Status: SHIPPED | OUTPATIENT
Start: 2022-07-19

## 2022-07-27 ENCOUNTER — OFFICE VISIT (OUTPATIENT)
Dept: FAMILY MEDICINE CLINIC | Age: 42
End: 2022-07-27
Payer: COMMERCIAL

## 2022-07-27 VITALS
BODY MASS INDEX: 30.4 KG/M2 | OXYGEN SATURATION: 96 % | WEIGHT: 161 LBS | DIASTOLIC BLOOD PRESSURE: 60 MMHG | HEIGHT: 61 IN | HEART RATE: 100 BPM | SYSTOLIC BLOOD PRESSURE: 110 MMHG

## 2022-07-27 DIAGNOSIS — R10.9 FLANK PAIN: ICD-10-CM

## 2022-07-27 DIAGNOSIS — R53.83 FATIGUE, UNSPECIFIED TYPE: ICD-10-CM

## 2022-07-27 DIAGNOSIS — R53.83 FATIGUE, UNSPECIFIED TYPE: Primary | ICD-10-CM

## 2022-07-27 LAB
BILIRUBIN, POC: NORMAL
BLOOD URINE, POC: NORMAL
CLARITY, POC: NORMAL
COLOR, POC: YELLOW
GLUCOSE URINE, POC: NORMAL
KETONES, POC: NORMAL
LEUKOCYTE EST, POC: NORMAL
NITRITE, POC: NORMAL
PH, POC: 7
PROTEIN, POC: NORMAL
SPECIFIC GRAVITY, POC: 1.02
UROBILINOGEN, POC: 0.2

## 2022-07-27 PROCEDURE — G8427 DOCREV CUR MEDS BY ELIG CLIN: HCPCS | Performed by: NURSE PRACTITIONER

## 2022-07-27 PROCEDURE — 81002 URINALYSIS NONAUTO W/O SCOPE: CPT | Performed by: NURSE PRACTITIONER

## 2022-07-27 PROCEDURE — G8417 CALC BMI ABV UP PARAM F/U: HCPCS | Performed by: NURSE PRACTITIONER

## 2022-07-27 PROCEDURE — 4004F PT TOBACCO SCREEN RCVD TLK: CPT | Performed by: NURSE PRACTITIONER

## 2022-07-27 PROCEDURE — 99213 OFFICE O/P EST LOW 20 MIN: CPT | Performed by: NURSE PRACTITIONER

## 2022-07-27 ASSESSMENT — PATIENT HEALTH QUESTIONNAIRE - PHQ9
10. IF YOU CHECKED OFF ANY PROBLEMS, HOW DIFFICULT HAVE THESE PROBLEMS MADE IT FOR YOU TO DO YOUR WORK, TAKE CARE OF THINGS AT HOME, OR GET ALONG WITH OTHER PEOPLE: 1
SUM OF ALL RESPONSES TO PHQ QUESTIONS 1-9: 10
1. LITTLE INTEREST OR PLEASURE IN DOING THINGS: 1
8. MOVING OR SPEAKING SO SLOWLY THAT OTHER PEOPLE COULD HAVE NOTICED. OR THE OPPOSITE, BEING SO FIGETY OR RESTLESS THAT YOU HAVE BEEN MOVING AROUND A LOT MORE THAN USUAL: 1
4. FEELING TIRED OR HAVING LITTLE ENERGY: 1
7. TROUBLE CONCENTRATING ON THINGS, SUCH AS READING THE NEWSPAPER OR WATCHING TELEVISION: 1
SUM OF ALL RESPONSES TO PHQ QUESTIONS 1-9: 10
2. FEELING DOWN, DEPRESSED OR HOPELESS: 0
6. FEELING BAD ABOUT YOURSELF - OR THAT YOU ARE A FAILURE OR HAVE LET YOURSELF OR YOUR FAMILY DOWN: 0
3. TROUBLE FALLING OR STAYING ASLEEP: 3
5. POOR APPETITE OR OVEREATING: 3
9. THOUGHTS THAT YOU WOULD BE BETTER OFF DEAD, OR OF HURTING YOURSELF: 0
SUM OF ALL RESPONSES TO PHQ9 QUESTIONS 1 & 2: 1

## 2022-07-27 ASSESSMENT — ANXIETY QUESTIONNAIRES
7. FEELING AFRAID AS IF SOMETHING AWFUL MIGHT HAPPEN: 0
4. TROUBLE RELAXING: 0
2. NOT BEING ABLE TO STOP OR CONTROL WORRYING: 0
1. FEELING NERVOUS, ANXIOUS, OR ON EDGE: 1
6. BECOMING EASILY ANNOYED OR IRRITABLE: 0
3. WORRYING TOO MUCH ABOUT DIFFERENT THINGS: 1
IF YOU CHECKED OFF ANY PROBLEMS ON THIS QUESTIONNAIRE, HOW DIFFICULT HAVE THESE PROBLEMS MADE IT FOR YOU TO DO YOUR WORK, TAKE CARE OF THINGS AT HOME, OR GET ALONG WITH OTHER PEOPLE: NOT DIFFICULT AT ALL
5. BEING SO RESTLESS THAT IT IS HARD TO SIT STILL: 0
GAD7 TOTAL SCORE: 2

## 2022-07-27 NOTE — PROGRESS NOTES
2022  Frankey Raker (: 1980)  38 y.o.    ASSESSMENT and PLAN:  Parminder Mathews was seen today for 3 month follow-up. Diagnoses and all orders for this visit:    Fatigue, unspecified type  -     Renal Function Panel; Future  -     TSH with Reflex; Future  -     CBC; Future  -update labs. -recommend healthy diet, adequate sleep, reviewed sleep hygiene. Flank pain  -     POCT Urinalysis no Micro  -     Culture, Urine  -ua negative, will send culture.  -no cva tenderness.   -continue to monitor, increase fluids.  -alarm symptoms discussed. -f/u with urology    No follow-ups on file. HPI  Mood-taking seroquel 300 mg nightly. Taking Vyvance 40 mg daily, taking appropriately. Controlling symptoms. Taking buspar 30 mg bid. Was taking effexor, didn't get refill on time. Follows with psychiatry, and therapist. Wants to stay off. Effexor. Feels symptoms well controlled. Denies si/hi. Sleep ok, appetite ok. Follows with urology group for kidney stones. Intermittent flank pain. Denies n/v, fevers, chills, body aches, Urinary frequency, dysuria. no abdominal pain. No blood in urine. Review of Systems   Constitutional:  Negative for activity change, appetite change, fatigue, fever and unexpected weight change. Respiratory:  Negative for cough, chest tightness, shortness of breath and wheezing. Cardiovascular:  Negative for chest pain, palpitations and leg swelling. Gastrointestinal:  Negative for abdominal distention, abdominal pain, anal bleeding, blood in stool, constipation, diarrhea, nausea, rectal pain and vomiting. Genitourinary:  Positive for flank pain. Negative for difficulty urinating, dysuria, frequency and hematuria. Musculoskeletal:  Negative for gait problem. Neurological: Negative. Negative for dizziness, syncope, weakness, light-headedness, numbness and headaches. Psychiatric/Behavioral: Negative.        Allergies, past medical history, family history, and social Rosalva Patton MD        sodium chloride flush 0.9 % injection 10 mL  10 mL IntraVENous PRN Floresita Leal MD           Vitals:    07/27/22 1124   BP: 110/60   Site: Right Upper Arm   Position: Sitting   Cuff Size: Medium Adult   Pulse: 100   SpO2: 96%   Weight: 161 lb (73 kg)   Height: 5' 1\" (1.549 m)     Estimated body mass index is 30.42 kg/m² as calculated from the following:    Height as of this encounter: 5' 1\" (1.549 m). Weight as of this encounter: 161 lb (73 kg). Physical Exam  Vitals reviewed. Constitutional:       Appearance: Normal appearance. She is normal weight. HENT:      Head: Normocephalic and atraumatic. Nose: Nose normal.   Eyes:      Conjunctiva/sclera: Conjunctivae normal.   Cardiovascular:      Rate and Rhythm: Normal rate and regular rhythm. Pulses: Normal pulses. Heart sounds: Normal heart sounds. Pulmonary:      Effort: Pulmonary effort is normal. No respiratory distress. Breath sounds: Normal breath sounds. No wheezing or rhonchi. Chest:      Chest wall: No tenderness. Abdominal:      General: Abdomen is flat. Bowel sounds are normal. There is no distension. Palpations: Abdomen is soft. Tenderness: There is no abdominal tenderness. There is no right CVA tenderness, left CVA tenderness, guarding or rebound. Musculoskeletal:         General: Normal range of motion. Cervical back: Normal range of motion and neck supple. Skin:     General: Skin is warm and dry. Capillary Refill: Capillary refill takes less than 2 seconds. Neurological:      General: No focal deficit present. Mental Status: She is alert and oriented to person, place, and time. Mental status is at baseline. Psychiatric:         Mood and Affect: Mood normal.         Behavior: Behavior normal.         Thought Content:  Thought content normal.         Judgment: Judgment normal.

## 2022-07-28 LAB
ALBUMIN SERPL-MCNC: 4.7 G/DL (ref 3.4–5)
ANION GAP SERPL CALCULATED.3IONS-SCNC: 19 MMOL/L (ref 3–16)
BUN BLDV-MCNC: 11 MG/DL (ref 7–20)
CALCIUM SERPL-MCNC: 10.1 MG/DL (ref 8.3–10.6)
CHLORIDE BLD-SCNC: 100 MMOL/L (ref 99–110)
CO2: 22 MMOL/L (ref 21–32)
CREAT SERPL-MCNC: 1 MG/DL (ref 0.6–1.1)
GFR AFRICAN AMERICAN: >60
GFR NON-AFRICAN AMERICAN: >60
GLUCOSE BLD-MCNC: 101 MG/DL (ref 70–99)
HCT VFR BLD CALC: 44.3 % (ref 36–48)
HEMOGLOBIN: 14.8 G/DL (ref 12–16)
MCH RBC QN AUTO: 33.5 PG (ref 26–34)
MCHC RBC AUTO-ENTMCNC: 33.5 G/DL (ref 31–36)
MCV RBC AUTO: 99.9 FL (ref 80–100)
PDW BLD-RTO: 13.5 % (ref 12.4–15.4)
PHOSPHORUS: 3 MG/DL (ref 2.5–4.9)
PLATELET # BLD: 248 K/UL (ref 135–450)
PLATELET SLIDE REVIEW: ADEQUATE
PMV BLD AUTO: 10.4 FL (ref 5–10.5)
POTASSIUM SERPL-SCNC: 5.2 MMOL/L (ref 3.5–5.1)
RBC # BLD: 4.43 M/UL (ref 4–5.2)
SLIDE REVIEW: NORMAL
SODIUM BLD-SCNC: 141 MMOL/L (ref 136–145)
TSH REFLEX: 1.7 UIU/ML (ref 0.27–4.2)
WBC # BLD: 4.9 K/UL (ref 4–11)

## 2022-07-29 LAB — URINE CULTURE, ROUTINE: NORMAL

## 2022-08-01 DIAGNOSIS — E87.5 HYPERKALEMIA: Primary | ICD-10-CM

## 2022-08-07 ASSESSMENT — ENCOUNTER SYMPTOMS
VOMITING: 0
DIARRHEA: 0
ABDOMINAL DISTENTION: 0
RECTAL PAIN: 0
ANAL BLEEDING: 0
NAUSEA: 0
COUGH: 0
CONSTIPATION: 0
SHORTNESS OF BREATH: 0
CHEST TIGHTNESS: 0
WHEEZING: 0
ABDOMINAL PAIN: 0
BLOOD IN STOOL: 0

## 2022-08-10 ENCOUNTER — HOSPITAL ENCOUNTER (EMERGENCY)
Age: 42
Discharge: HOME | End: 2022-08-11
Attending: EMERGENCY MEDICINE
Payer: COMMERCIAL

## 2022-08-10 DIAGNOSIS — R10.12 ABDOMINAL PAIN, LEFT UPPER QUADRANT: ICD-10-CM

## 2022-08-10 DIAGNOSIS — R07.9 LEFT-SIDED CHEST PAIN: Primary | ICD-10-CM

## 2022-08-10 DIAGNOSIS — N30.01 ACUTE CYSTITIS WITH HEMATURIA: ICD-10-CM

## 2022-08-10 DIAGNOSIS — K42.9 PERIUMBILICAL HERNIA: ICD-10-CM

## 2022-08-10 DIAGNOSIS — Z20.822 EXPOSURE TO COVID-19 VIRUS: ICD-10-CM

## 2022-08-10 LAB
INFLUENZA A: NOT DETECTED
INFLUENZA B: NOT DETECTED
SARS-COV-2 RNA, RT PCR: NOT DETECTED

## 2022-08-10 PROCEDURE — 81001 URINALYSIS AUTO W/SCOPE: CPT

## 2022-08-10 PROCEDURE — 93005 ELECTROCARDIOGRAM TRACING: CPT | Performed by: EMERGENCY MEDICINE

## 2022-08-10 PROCEDURE — 96374 THER/PROPH/DIAG INJ IV PUSH: CPT

## 2022-08-10 PROCEDURE — 87636 SARSCOV2 & INF A&B AMP PRB: CPT

## 2022-08-10 PROCEDURE — 99285 EMERGENCY DEPT VISIT HI MDM: CPT

## 2022-08-10 PROCEDURE — 96375 TX/PRO/DX INJ NEW DRUG ADDON: CPT

## 2022-08-10 PROCEDURE — 87086 URINE CULTURE/COLONY COUNT: CPT

## 2022-08-10 PROCEDURE — 96376 TX/PRO/DX INJ SAME DRUG ADON: CPT

## 2022-08-10 RX ORDER — KETOROLAC TROMETHAMINE 30 MG/ML
15 INJECTION, SOLUTION INTRAMUSCULAR; INTRAVENOUS ONCE
Status: COMPLETED | OUTPATIENT
Start: 2022-08-10 | End: 2022-08-11

## 2022-08-10 RX ORDER — MORPHINE SULFATE 4 MG/ML
4 INJECTION, SOLUTION INTRAMUSCULAR; INTRAVENOUS ONCE
Status: COMPLETED | OUTPATIENT
Start: 2022-08-10 | End: 2022-08-11

## 2022-08-10 RX ORDER — ONDANSETRON 2 MG/ML
4 INJECTION INTRAMUSCULAR; INTRAVENOUS ONCE
Status: COMPLETED | OUTPATIENT
Start: 2022-08-10 | End: 2022-08-11

## 2022-08-10 RX ORDER — 0.9 % SODIUM CHLORIDE 0.9 %
500 INTRAVENOUS SOLUTION INTRAVENOUS ONCE
Status: COMPLETED | OUTPATIENT
Start: 2022-08-10 | End: 2022-08-11

## 2022-08-10 ASSESSMENT — PAIN SCALES - GENERAL: PAINLEVEL_OUTOF10: 0

## 2022-08-10 ASSESSMENT — PAIN - FUNCTIONAL ASSESSMENT: PAIN_FUNCTIONAL_ASSESSMENT: NONE - DENIES PAIN

## 2022-08-10 NOTE — Clinical Note
Dimitry Kunz was seen and treated in our emergency department on 8/10/2022. She may return to work on 08/15/2022. Must be without fever for 24 hours with improving symptoms      If you have any questions or concerns, please don't hesitate to call.       Inderjit Joyce MD

## 2022-08-11 ENCOUNTER — APPOINTMENT (OUTPATIENT)
Dept: CT IMAGING | Age: 42
End: 2022-08-11
Payer: COMMERCIAL

## 2022-08-11 VITALS
DIASTOLIC BLOOD PRESSURE: 60 MMHG | RESPIRATION RATE: 18 BRPM | HEIGHT: 62 IN | WEIGHT: 155 LBS | HEART RATE: 100 BPM | SYSTOLIC BLOOD PRESSURE: 99 MMHG | OXYGEN SATURATION: 99 % | TEMPERATURE: 97.7 F | BODY MASS INDEX: 28.52 KG/M2

## 2022-08-11 LAB
A/G RATIO: 1.8 (ref 1.1–2.2)
ALBUMIN SERPL-MCNC: 4.5 G/DL (ref 3.4–5)
ALP BLD-CCNC: 93 U/L (ref 40–129)
ALT SERPL-CCNC: 13 U/L (ref 10–40)
AMORPHOUS: ABNORMAL /HPF
ANION GAP SERPL CALCULATED.3IONS-SCNC: 12 MMOL/L (ref 3–16)
AST SERPL-CCNC: 35 U/L (ref 15–37)
BACTERIA: ABNORMAL /HPF
BASOPHILS ABSOLUTE: 0.1 K/UL (ref 0–0.2)
BASOPHILS RELATIVE PERCENT: 1.2 %
BILIRUB SERPL-MCNC: 0.7 MG/DL (ref 0–1)
BILIRUBIN URINE: NEGATIVE
BLOOD, URINE: ABNORMAL
BUN BLDV-MCNC: 12 MG/DL (ref 7–20)
CALCIUM SERPL-MCNC: 10.6 MG/DL (ref 8.3–10.6)
CHLORIDE BLD-SCNC: 103 MMOL/L (ref 99–110)
CLARITY: CLEAR
CO2: 24 MMOL/L (ref 21–32)
COLOR: YELLOW
CREAT SERPL-MCNC: 1 MG/DL (ref 0.6–1.1)
CRYSTALS, UA: ABNORMAL /HPF
EKG ATRIAL RATE: 94 BPM
EKG DIAGNOSIS: NORMAL
EKG P AXIS: 36 DEGREES
EKG P-R INTERVAL: 104 MS
EKG Q-T INTERVAL: 328 MS
EKG QRS DURATION: 76 MS
EKG QTC CALCULATION (BAZETT): 410 MS
EKG R AXIS: 67 DEGREES
EKG T AXIS: 54 DEGREES
EKG VENTRICULAR RATE: 94 BPM
EOSINOPHILS ABSOLUTE: 0.3 K/UL (ref 0–0.6)
EOSINOPHILS RELATIVE PERCENT: 2.8 %
GFR AFRICAN AMERICAN: >60
GFR NON-AFRICAN AMERICAN: >60
GLUCOSE BLD-MCNC: 81 MG/DL (ref 70–99)
GLUCOSE URINE: NEGATIVE MG/DL
HCG QUALITATIVE: NEGATIVE
HCT VFR BLD CALC: 39.8 % (ref 36–48)
HEMOGLOBIN: 13.6 G/DL (ref 12–16)
KETONES, URINE: NEGATIVE MG/DL
LEUKOCYTE ESTERASE, URINE: ABNORMAL
LIPASE: 33 U/L (ref 13–60)
LYMPHOCYTES ABSOLUTE: 3.7 K/UL (ref 1–5.1)
LYMPHOCYTES RELATIVE PERCENT: 35.6 %
MCH RBC QN AUTO: 33.2 PG (ref 26–34)
MCHC RBC AUTO-ENTMCNC: 34.2 G/DL (ref 31–36)
MCV RBC AUTO: 97.2 FL (ref 80–100)
MICROSCOPIC EXAMINATION: YES
MONOCYTES ABSOLUTE: 0.7 K/UL (ref 0–1.3)
MONOCYTES RELATIVE PERCENT: 6.9 %
MUCUS: ABNORMAL /LPF
NEUTROPHILS ABSOLUTE: 5.6 K/UL (ref 1.7–7.7)
NEUTROPHILS RELATIVE PERCENT: 53.5 %
NITRITE, URINE: NEGATIVE
PDW BLD-RTO: 13.2 % (ref 12.4–15.4)
PH UA: 6 (ref 5–8)
PLATELET # BLD: 237 K/UL (ref 135–450)
PMV BLD AUTO: 10.6 FL (ref 5–10.5)
POTASSIUM SERPL-SCNC: 3.9 MMOL/L (ref 3.5–5.1)
POTASSIUM SERPL-SCNC: 5.3 MMOL/L (ref 3.5–5.1)
PROTEIN UA: NEGATIVE MG/DL
RBC # BLD: 4.09 M/UL (ref 4–5.2)
RBC UA: ABNORMAL /HPF (ref 0–4)
SODIUM BLD-SCNC: 139 MMOL/L (ref 136–145)
SPECIFIC GRAVITY UA: >=1.03 (ref 1–1.03)
TOTAL PROTEIN: 7 G/DL (ref 6.4–8.2)
TROPONIN: <0.01 NG/ML
URINE CULTURE, ROUTINE: NORMAL
URINE REFLEX TO CULTURE: YES
URINE TYPE: ABNORMAL
UROBILINOGEN, URINE: 0.2 E.U./DL
WBC # BLD: 10.4 K/UL (ref 4–11)
WBC UA: ABNORMAL /HPF (ref 0–5)

## 2022-08-11 PROCEDURE — 84132 ASSAY OF SERUM POTASSIUM: CPT

## 2022-08-11 PROCEDURE — 93010 ELECTROCARDIOGRAM REPORT: CPT | Performed by: INTERNAL MEDICINE

## 2022-08-11 PROCEDURE — 96375 TX/PRO/DX INJ NEW DRUG ADDON: CPT

## 2022-08-11 PROCEDURE — 84484 ASSAY OF TROPONIN QUANT: CPT

## 2022-08-11 PROCEDURE — 96376 TX/PRO/DX INJ SAME DRUG ADON: CPT

## 2022-08-11 PROCEDURE — 84703 CHORIONIC GONADOTROPIN ASSAY: CPT

## 2022-08-11 PROCEDURE — 74177 CT ABD & PELVIS W/CONTRAST: CPT

## 2022-08-11 PROCEDURE — 2580000003 HC RX 258: Performed by: EMERGENCY MEDICINE

## 2022-08-11 PROCEDURE — 2500000003 HC RX 250 WO HCPCS: Performed by: EMERGENCY MEDICINE

## 2022-08-11 PROCEDURE — 80053 COMPREHEN METABOLIC PANEL: CPT

## 2022-08-11 PROCEDURE — 6360000004 HC RX CONTRAST MEDICATION: Performed by: EMERGENCY MEDICINE

## 2022-08-11 PROCEDURE — 6360000002 HC RX W HCPCS: Performed by: EMERGENCY MEDICINE

## 2022-08-11 PROCEDURE — A4216 STERILE WATER/SALINE, 10 ML: HCPCS | Performed by: EMERGENCY MEDICINE

## 2022-08-11 PROCEDURE — 71260 CT THORAX DX C+: CPT | Performed by: EMERGENCY MEDICINE

## 2022-08-11 PROCEDURE — 83690 ASSAY OF LIPASE: CPT

## 2022-08-11 PROCEDURE — 85025 COMPLETE CBC W/AUTO DIFF WBC: CPT

## 2022-08-11 PROCEDURE — 36415 COLL VENOUS BLD VENIPUNCTURE: CPT

## 2022-08-11 PROCEDURE — 96374 THER/PROPH/DIAG INJ IV PUSH: CPT

## 2022-08-11 RX ORDER — ONDANSETRON 4 MG/1
4 TABLET, ORALLY DISINTEGRATING ORAL EVERY 8 HOURS PRN
Qty: 20 TABLET | Refills: 0 | Status: SHIPPED | OUTPATIENT
Start: 2022-08-11

## 2022-08-11 RX ORDER — NITROFURANTOIN 25; 75 MG/1; MG/1
100 CAPSULE ORAL 2 TIMES DAILY
Qty: 10 CAPSULE | Refills: 0 | Status: SHIPPED | OUTPATIENT
Start: 2022-08-11 | End: 2022-08-16

## 2022-08-11 RX ORDER — KETOROLAC TROMETHAMINE 30 MG/ML
15 INJECTION, SOLUTION INTRAMUSCULAR; INTRAVENOUS ONCE
Status: COMPLETED | OUTPATIENT
Start: 2022-08-11 | End: 2022-08-11

## 2022-08-11 RX ORDER — MORPHINE SULFATE 4 MG/ML
4 INJECTION, SOLUTION INTRAMUSCULAR; INTRAVENOUS ONCE
Status: COMPLETED | OUTPATIENT
Start: 2022-08-11 | End: 2022-08-11

## 2022-08-11 RX ADMIN — ONDANSETRON HYDROCHLORIDE 4 MG: 2 INJECTION, SOLUTION INTRAMUSCULAR; INTRAVENOUS at 00:34

## 2022-08-11 RX ADMIN — MORPHINE SULFATE 4 MG: 4 INJECTION, SOLUTION INTRAMUSCULAR; INTRAVENOUS at 00:35

## 2022-08-11 RX ADMIN — KETOROLAC TROMETHAMINE 15 MG: 30 INJECTION, SOLUTION INTRAMUSCULAR at 00:34

## 2022-08-11 RX ADMIN — KETOROLAC TROMETHAMINE 15 MG: 30 INJECTION, SOLUTION INTRAMUSCULAR at 02:26

## 2022-08-11 RX ADMIN — MORPHINE SULFATE 4 MG: 4 INJECTION, SOLUTION INTRAMUSCULAR; INTRAVENOUS at 02:26

## 2022-08-11 RX ADMIN — IOPAMIDOL 85 ML: 755 INJECTION, SOLUTION INTRAVENOUS at 01:34

## 2022-08-11 RX ADMIN — SODIUM CHLORIDE 500 ML: 9 INJECTION, SOLUTION INTRAVENOUS at 00:34

## 2022-08-11 RX ADMIN — FAMOTIDINE 20 MG: 10 INJECTION, SOLUTION INTRAVENOUS at 00:34

## 2022-08-11 ASSESSMENT — PAIN SCALES - GENERAL
PAINLEVEL_OUTOF10: 10
PAINLEVEL_OUTOF10: 5

## 2022-08-11 ASSESSMENT — ENCOUNTER SYMPTOMS
SORE THROAT: 1
ABDOMINAL PAIN: 1
CHEST TIGHTNESS: 0
VOMITING: 0
BACK PAIN: 0
DIARRHEA: 0
SHORTNESS OF BREATH: 1
COUGH: 1
COLOR CHANGE: 0

## 2022-08-11 ASSESSMENT — PAIN DESCRIPTION - LOCATION
LOCATION: RIB CAGE
LOCATION: ABDOMEN

## 2022-08-11 ASSESSMENT — PAIN DESCRIPTION - ORIENTATION
ORIENTATION: LEFT
ORIENTATION: LEFT

## 2022-08-11 NOTE — DISCHARGE INSTRUCTIONS
Take Tylenol or Motrin as needed for pain. Take Zofran for nausea. Use incentive spirometer to avoid pneumonia. Check your pulse oxygenation and if less than 90, return to the emergency department. Also, return to the emergency department for any worsening chest pain with shortness of breath, nonstop vomiting, persistent fever, or any other concerns. Follow-up with your primary doctor in 2 to 3 days for repeat assessment via telemedicine.

## 2022-08-11 NOTE — ED PROVIDER NOTES
[address] ED  EMERGENCY DEPARTMENT ENCOUNTER        Pt Name: Sagrario Mclain  MRN: [id number]  [place] 1980  Date of evaluation: 8/10/2022  Provider: Neema Gonzalez MD  PCP: Susan Mcclure, [address] [place] [address]       Chief Complaint   Patient presents with    Other     Pt states for the past two weeks has been having hot flashes. Seen at PCP. Pt states whole family has Covid. Felt rib pop and has a fever. HISTORY OFPRESENT ILLNESS   (Location/Symptom, Timing/Onset, Context/Setting, Quality, Duration, Modifying Factors,Severity)  Note limiting factors. Sagrario Mclain is a 38 y.o. female presenting today due to concern for having intermittent hot flashes over the last couple of days although actually developing a fever earlier today as high as 101 Fahrenheit and reporting a moderate headache over the last couple of days and generalized fatigue with lightheadedness. She reports that multiple family members at her home have [place] although she did test negative at home. Headache is not the worst of her life. She reports that earlier today she was leaning over to pick something up and ultimately felt a pop to the left lower chest and has been having severe pain ever since with any movements or breathing. She denies any radiation of the pain to the back but it does go to the right abdomen/chest region. She did get her COVID-vaccine. She denies any urinary or vaginal complaints. She denies any lower abdominal pain. She denies any numbness or weakness in the arms or legs. He denies any syncope or falls. Due to severe left-sided chest pain and feeling a pop in this region, she came to the emergency department for further evaluation since nothing was helping for the pain at home. She has a prior history of opiate addiction but states she feels safe taking a dose of morphine in the emergency department but does not want to take anything at home narcotic wise.   She denies any leg swelling or history of blood clots. REVIEW OF SYSTEMS    (2-9 systems for level 4, 10 or more for level 5)     Review of Systems   Constitutional:  Positive for activity change, appetite change, chills, fatigue and fever. Negative for diaphoresis. HENT:  Positive for sore throat. Negative for congestion. Respiratory:  Positive for cough and shortness of breath. Negative for chest tightness. Cardiovascular:  Positive for chest pain (left lower). Gastrointestinal:  Positive for abdominal pain (upper). Negative for diarrhea and vomiting. Genitourinary:  Negative for difficulty urinating, dysuria, flank pain, pelvic pain and vaginal pain. Musculoskeletal:  Negative for back pain, gait problem and neck pain. Skin:  Negative for color change, rash and wound. Neurological:  Positive for light-headedness and headaches. Negative for dizziness, syncope, weakness and numbness. Psychiatric/Behavioral:  Negative for confusion. The patient is nervous/anxious. Positives and Pertinent negatives as per HPI. PASTMEDICAL HISTORY     Past Medical History:   Diagnosis Date    Acute ischemic colitis ([address] 10/20/15    Anesthesia complication     wakes from anesthesia with migraine    Asthma     Had real bad as child and then grew out of it and  then got pregnant it started acting up again.     Bipolar 1 disorder ([address]     Bipolar affective disorder, current episode depressed ([address] 5/28/2015    Depression     Kidney stones     has had multiple kidney stones     Migraine     Nodule of left lung     pt has non calcified nodules on both lungs    Nodule of right lung     Opiate addiction ([address]     Past hx    Stage 3 chronic kidney disease ([address]     Wears contact lenses          SURGICAL HISTORY       Past Surgical History:   Procedure Laterality Date    COLONOSCOPY  10/20/15    possible ischemic colitis    CYSTOSCOPY Left 08/13/2020    left uretroscopy, with stone extraction    CYSTOSCOPY Left 10/1/2021    CYSTOSCOPY LEFT  URETERAL STENT INSERTION performed by Ricardo Hernandez MD at [address] / Maddison Payton Left 8/13/2020    CYSTOSCOPY, URETEROSCOPY, HOLMIUM LASER LITHOTRIPSY, STONE EXTRACTION performed by Jonn Danielle MD at [address]  10/11/2016    Hysteroscopy, Novasure Ablation    LITHOTRIPSY      x 7    NEPHROLITHOTOMY Left 9/17/2020    LEFT PERCUTANEOUS NEPHROLITHOTOMY, CYSTOSCOPY WITH HOLMIUM LASER LITHOTRIPSY performed by Jil Hudson MD at [facility] OR    NEPHROSTOMY Left 9/17/2020    NEPHROSTOMY PERCUTANEOUS TUBE INSERTION performed by Jil Hudson MD at [address]      X 2 then removed    [address]       Discharge Medication List as of 8/11/2022  3:35 AM        CONTINUE these medications which have NOT CHANGED    Details   albuterol sulfate HFA (PROVENTIL;VENTOLIN;PROAIR) 108 (90 Base) MCG/ACT inhaler INHALE TWO PUFFS BY MOUTH EVERY 4 HOURS AS NEEDED FOR WHEEZING, Disp-18 g, R-0Normal      venlafaxine (EFFEXOR XR) 37.5 MG extended release capsule TAKE 1 CAPSULE BY MOUTH DAILY FOR ONE WEEK THEN INCREASE TO 2 CAPULES BY MOUTH DAILY, Disp-60 capsule, R-2Normal      busPIRone (BUSPAR) 30 MG tablet Take 30 mg by mouth 2 times dailyHistorical Med      nicotine (NICODERM CQ) 21 MG/24HR Place 1 patch onto the skin daily, Disp-30 patch, R-3Normal      metFORMIN (GLUCOPHAGE) 500 MG tablet Take 500 mg by mouth 2 times daily (with meals)Historical Med      Lisdexamfetamine Dimesylate (VYVANSE) 40 MG CAPS Take 40 mg by mouth daily. Historical Med      ibuprofen (IBU) 600 MG tablet Take 1 tablet by mouth every 6 hours as needed for Pain, Disp-30 tablet, R-0Normal      acetaminophen (APAP EXTRA STRENGTH) 500 MG tablet Take 2 tablets by mouth every 6 hours as needed for Pain, Disp-40 tablet, R-0Normal      !! ondansetron (ZOFRAN ODT) 4 MG disintegrating tablet Take 1 tablet by mouth every 8 hours as needed for Nausea Let dissolve in mouth., Disp-10 tablet, R-0Normal      omeprazole (PRILOSEC) 40 MG delayed release capsule Take 1 capsule by mouth every morning (before breakfast), Disp-90 capsule, R-1Normal      diphenhydrAMINE (BENADRYL) 50 MG capsule Take 50 mg by mouth nightly as needed for SleepHistorical Med      buprenorphine-naloxone (SUBOXONE) 8-2 MG SUBL SL tablet Place 1 tablet under the tongue daily. Pt takes 12 mg dailyHistorical Med      QUEtiapine (SEROQUEL XR) 150 MG TB24 extended release tablet Take 150 mg by mouth nightlyHistorical Med       !! - Potential duplicate medications found. Please discuss with provider. ALLERGIES     Patient has no known allergies. FAMILY HISTORY       Family History   Problem Relation Age of Onset    High Blood Pressure Father     Kidney Disease Father     Cancer Maternal Grandmother     Cancer Maternal Grandfather     Kidney Disease Maternal Grandfather     Cancer Paternal Grandmother     Cancer Paternal Grandfather     Cancer Other 3        ALL    Diabetes Maternal Uncle     COPD Mother     Depression Sister     Ovarian Cancer Sister           SOCIAL HISTORY       Social History     Socioeconomic History    Marital status:      Spouse name: Blaire Vogel    Number of children: 3    Years of education: 12    Highest education level: None   Occupational History    Occupation: unemployed   Tobacco Use    Smoking status: Every Day     Packs/day: 0.50     Years: 21.00     Pack years: 10.50     Types: Cigarettes     Start date: 4/15/1995    Smokeless tobacco: Never   Vaping Use    Vaping Use: Never used   Substance and Sexual Activity    Alcohol use: Not Currently     Comment: quit 2 months ago.      Drug use: Yes     Types: Marijuana Maksim Lerma     Comment: occas    Sexual activity: Yes     Partners: Male     Social Determinants of Health Financial Resource Strain: Low Risk     Difficulty of Paying Living Expenses: Not hard at all   Food Insecurity: No Food Insecurity    Worried About [address] in the Last Year: Never true    [address] in the Last Year: Never true   Transportation Needs: No Transportation Needs    Lack of Transportation (Medical): No    Lack of Transportation (Non-Medical): No   Housing Stability: Low Risk     Unable to Pay for Housing in the Last Year: No    Number of [place] in the Last Year: 1    Unstable Housing in the Last Year: No       SCREENINGS    Mili Coma Scale  Eye Opening: Spontaneous  Best Verbal Response: Oriented  Best Motor Response: Obeys commands  Mili Coma Scale Score: 15           PHYSICAL EXAM    (up to 7 for level 4, 8 or more for level 5)     ED Triage Vitals [08/10/22 2226]   BP Temp Temp Source Heart Rate Resp SpO2 Height Weight   116/78 97.7 °F (36.5 °C) Oral 100 18 97 % 5' 2\" (1.575 m) 155 lb (70.3 kg)       Physical Exam  Vitals and nursing note reviewed. Constitutional:       General: She is awake. She is in acute distress (moderate but severe when pressing on upper abdomen). Appearance: Normal appearance. She is well-developed, well-groomed and overweight. She is not ill-appearing, toxic-appearing or diaphoretic. Interventions: She is not intubated. HENT:      Head: Normocephalic and atraumatic. Right Ear: External ear normal.      Left Ear: External ear normal.      Nose: Nose normal.      Mouth/Throat:      Mouth: Mucous membranes are moist.   Eyes:      General:         Right eye: No discharge. Left eye: No discharge. Conjunctiva/sclera: Conjunctivae normal.      Pupils: Pupils are equal, round, and reactive to light. Neck:      Trachea: No tracheal deviation. Cardiovascular:      Rate and Rhythm: Normal rate and regular rhythm. Pulses: Normal pulses. Radial pulses are 2+ on the right side and 2+ on the left side. Pulmonary:      Effort: Pulmonary effort is normal. No tachypnea, bradypnea, accessory muscle usage, prolonged expiration, respiratory distress or retractions. She is not intubated. Breath sounds: Normal breath sounds and air entry. No stridor. No decreased breath sounds, wheezing, rhonchi or rales. Chest:      Chest wall: Tenderness present. No lacerations, deformity, swelling, crepitus or edema. Abdominal:      General: Abdomen is flat. Bowel sounds are normal. There is no distension. Palpations: Abdomen is soft. Abdomen is not rigid. Tenderness: There is abdominal tenderness (severe) in the right upper quadrant, epigastric area and left upper quadrant. There is no right CVA tenderness, left CVA tenderness, guarding or rebound. Negative signs include Bennett's sign and McBurney's sign. Musculoskeletal:         General: No tenderness or deformity. Normal range of motion. Cervical back: Full passive range of motion without pain, normal range of motion and neck supple. No edema, erythema, rigidity, tenderness or bony tenderness. Normal range of motion. Thoracic back: No tenderness or bony tenderness. Lumbar back: No tenderness or bony tenderness. Right lower leg: No edema. Left lower leg: No edema. Skin:     General: Skin is warm and dry. Coloration: Skin is not ashen, cyanotic, jaundiced or pale. Findings: No abrasion, abscess, bruising, ecchymosis, erythema, signs of injury, laceration, lesion, rash or wound. Neurological:      General: No focal deficit present. Mental Status: She is alert and oriented to person, place, and time. Mental status is at baseline. GCS: GCS eye subscore is 4. GCS verbal subscore is 5. GCS motor subscore is 6. Sensory: No sensory deficit. Motor: No weakness, tremor, atrophy, abnormal muscle tone or seizure activity.    Psychiatric:         Attention and Perception: Attention normal.         Mood and Affect: Affect normal. Mood is anxious. Speech: Speech normal. Speech is not delayed or slurred. Behavior: Behavior normal. Behavior is cooperative. DIAGNOSTIC RESULTS   :    Labs Reviewed   CBC WITH AUTO DIFFERENTIAL - Abnormal; Notable for the following components:       Result Value    MPV 10.6 (*)     All other components within normal limits   COMPREHENSIVE METABOLIC PANEL - Abnormal; Notable for the following components:    Potassium 5.3 (*)     All other components within normal limits   URINALYSIS WITH REFLEX TO CULTURE - Abnormal; Notable for the following components:    Blood, Urine MODERATE (*)     Leukocyte Esterase, Urine SMALL (*)     All other components within normal limits    Narrative:     ORDER WAS CANCELLED 08/11/2022 00:34, .. MICROSCOPIC URINALYSIS - Abnormal; Notable for the following components:    Mucus, UA 2+ (*)     WBC, UA 10-20 (*)     RBC, UA 5-10 (*)     Bacteria, UA 1+ (*)     Crystals, UA Few Ca. Oxalate (*)     All other components within normal limits    Narrative:     ORDER WAS CANCELLED 08/11/2022 00:34, .. COVID-19 & INFLUENZA COMBO   CULTURE, URINE   HCG, SERUM, QUALITATIVE   LIPASE   TROPONIN   POTASSIUM       All other labs were within normal range or not returned asof this dictation. EKG:  All EKG's are interpreted by the Emergency Department Physician who either signs or Co-signs this chart in the absence of a cardiologist.    The Ekg interpreted by me shows  Sinus rhythm with short ND noted  with a rate of 94  Axis is   Normal  QTc is  normal  ST Segments: no acute change and nonspecific changes  No significant change from prior EKG dated - 4/16/21  No STEMI         RADIOLOGY:   Non-plain film images such as CT, Ultrasound and MRI are read by the radiologist. Plainradiographic images are visualized and preliminarily interpreted by the  ED Provider with the belowfindings:        Interpretation per the Radiologist below, if available at the time of this note:    CT CHEST PULMONARY EMBOLISM W CONTRAST   Final Result   Negative for acute pulmonary embolus. No CT evidence for acute intra-or pelvic pathology      Right para median supraumbilical hernia containing omental fat but no   contained bowel. CT ABDOMEN PELVIS W IV CONTRAST Additional Contrast? None   Final Result   Negative for acute pulmonary embolus. No CT evidence for acute intra-or pelvic pathology      Right para median supraumbilical hernia containing omental fat but no   contained bowel. PROCEDURES   Unless otherwise noted below, none     Procedures    CRITICAL CARE TIME   N/A    CONSULTS:  None    EMERGENCY DEPARTMENT COURSE and DIFFERENTIAL DIAGNOSIS/MDM:   Vitals:    Vitals:    08/10/22 2226 08/11/22 0231 08/11/22 0232 08/11/22 0302   BP: 116/78 100/61 99/60 99/60   Pulse: 100      Resp: 18      Temp: 97.7 °F (36.5 °C)      TempSrc: Oral      SpO2: 97% 99% 99%    Weight: 155 lb (70.3 kg)      Height: 5' 2\" (1.575 m)          Patient was given the following medications:  Medications   morphine sulfate (PF) injection 4 mg (4 mg IntraVENous Given 8/11/22 0035)   ondansetron (ZOFRAN) injection 4 mg (4 mg IntraVENous Given 8/11/22 0034)   0.9 % sodium chloride bolus (0 mLs IntraVENous Stopped 8/11/22 0211)   famotidine (PEPCID) 20 mg in sodium chloride (PF) 10 mL injection (20 mg IntraVENous Given 8/11/22 0034)   ketorolac (TORADOL) injection 15 mg (15 mg IntraVENous Given 8/11/22 0034)   iopamidol (ISOVUE-370) 76 % injection 85 mL (85 mLs IntraVENous Given 8/11/22 0134)   morphine sulfate (PF) injection 4 mg (4 mg IntraVENous Given 8/11/22 0226)   ketorolac (TORADOL) injection 15 mg (15 mg IntraVENous Given 8/11/22 0226)     Patient was evaluated at developing severe upper abdominal/chest pain on the left side starting earlier today worse with breathing.   Due to the severity of the pain, I did ultimately order CT of the chest and abdomen to assess for any signs of trauma or bowel perforation or pulmonary embolism. This was negative per radiologist.  She did have significant improvement of pain while in the emergency department and during repeat abdominal exam and chest exam was feeling much better with only mild distress with palpation. Urinalysis showed possible urinary tract infection therefore I did prescribe her Macrobid and did inform her of the pharmacy having this called in for her. She knows to go pick this up. I actually called her at 1916 on 8/11/2022 about the prescription and she reported feeling much better and her abdomen and chest at this time. She was told prior to discharge that if she developed any worsening pain with vomiting, fever, severe shortness of breath, then return to the emergency department, but otherwise follow-up with her primary doctor over the next few days for any other concerns for possibility of COVID. She was well-appearing and in no acute distress at time of discharge and felt comfortable with this plan. Troponin was negative and story not suggestive of acute coronary syndrome. The patient tolerated their visit well. The patient and / or the family were informed of the results of any tests, a time was given to answer questions. FINAL IMPRESSION      1. Left-sided chest pain    2. Abdominal pain, left upper quadrant    3. Exposure to COVID-19 virus    4. Acute cystitis with hematuria    5.  Periumbilical hernia          DISPOSITION/PLAN   DISPOSITION Decision To Discharge 08/11/2022 03:26:55 AM      PATIENT REFERRED TO:  [place] [facility] [place] [facility]  [address] [facility]  [phone]  Go to   If symptoms worsen    Ivan Pace [address] [facility] [address]  [phone]    Schedule an appointment as soon as possible for a visit in 2 days      DISCHARGEMEDICATIONS:  Discharge Medication List as of 8/11/2022  3:35 AM        START taking these medications    Details   !! ondansetron (ZOFRAN ODT) 4 MG disintegrating tablet Take 1 tablet by mouth every 8 hours as needed for Nausea, Disp-20 tablet, R-0Print       !! - Potential duplicate medications found. Please discuss with provider.           DISCONTINUED MEDICATIONS:  Discharge Medication List as of 8/11/2022  3:35 AM                 (Please note that portions of this note were completed with a voicerecognition program.  Efforts were made to edit the dictations but occasionally words are mis-transcribed.)    Chema Bolden MD (electronically signed)            Chema Bolden MD  08/11/22 [id number]

## 2022-08-12 ENCOUNTER — CARE COORDINATION (OUTPATIENT)
Dept: CARE COORDINATION | Age: 42
End: 2022-08-12

## 2022-08-12 NOTE — CARE COORDINATION
Ambulatory Care Coordination  ED Follow up Call    Reason for ED visit:  Rib Pain   Status:     improved    Did you call your PCP prior to going to the ED? No      Did you receive a discharge instructions from the Emergency Room? Yes  Review of Instructions:     Understands what to report/when to return?:  Yes   Understands discharge instructions?:  Yes   Following discharge instructions?:  Yes   If not why? N/A    Are there any new complaints of pain? No  New Pain Meds? No    Constipation prophylaxis needed? N/A    If you have a wound is the dressing clean, dry, and intact? N/A  Understands wound care regimen? N/A    Are there any other complaints/concerns that you wish to tell your provider? Patient notes she is home resting and pain has somewhat improved. Denies SOB, chest pain not related to rib pain. Notes she was happy to know scans were normal when advised of this in the ED. Patient plans to monitor symptoms and if not improved will follow up with PCP. Denied scheduling at this time. FU appts/Provider:    No future appointments. New Medications?:   No      Medication Reconciliation by phone - No  Understands Medications? Yes  Taking Medications? Yes  Can you swallow your pills? Yes    Any further needs in the home i.e. Equipment?   Not Applicable    Link to services in community?:  N/A   Which services:  N/A

## 2022-08-18 ENCOUNTER — HOSPITAL ENCOUNTER (EMERGENCY)
Age: 42
Discharge: HOME | End: 2022-08-18
Attending: STUDENT IN AN ORGANIZED HEALTH CARE EDUCATION/TRAINING PROGRAM
Payer: COMMERCIAL

## 2022-08-18 ENCOUNTER — APPOINTMENT (OUTPATIENT)
Dept: CT IMAGING | Age: 42
End: 2022-08-18
Payer: COMMERCIAL

## 2022-08-18 ENCOUNTER — APPOINTMENT (OUTPATIENT)
Dept: GENERAL RADIOLOGY | Age: 42
End: 2022-08-18
Payer: COMMERCIAL

## 2022-08-18 ENCOUNTER — NURSE TRIAGE (OUTPATIENT)
Dept: OTHER | Facility: CLINIC | Age: 42
End: 2022-08-18

## 2022-08-18 VITALS
BODY MASS INDEX: 28.52 KG/M2 | HEART RATE: 88 BPM | TEMPERATURE: 98.3 F | RESPIRATION RATE: 16 BRPM | HEIGHT: 62 IN | OXYGEN SATURATION: 100 % | WEIGHT: 155 LBS | DIASTOLIC BLOOD PRESSURE: 70 MMHG | SYSTOLIC BLOOD PRESSURE: 112 MMHG

## 2022-08-18 DIAGNOSIS — R10.30 LOWER ABDOMINAL PAIN: Primary | ICD-10-CM

## 2022-08-18 LAB
A/G RATIO: 1.3 (ref 1.1–2.2)
ALBUMIN SERPL-MCNC: 4.6 G/DL (ref 3.4–5)
ALP BLD-CCNC: 113 U/L (ref 40–129)
ALT SERPL-CCNC: 11 U/L (ref 10–40)
ANION GAP SERPL CALCULATED.3IONS-SCNC: 12 MMOL/L (ref 3–16)
AST SERPL-CCNC: 15 U/L (ref 15–37)
BACTERIA: ABNORMAL /HPF
BASOPHILS ABSOLUTE: 0.1 K/UL (ref 0–0.2)
BASOPHILS RELATIVE PERCENT: 1 %
BILIRUB SERPL-MCNC: 0.5 MG/DL (ref 0–1)
BILIRUBIN URINE: NEGATIVE
BLOOD, URINE: ABNORMAL
BUN BLDV-MCNC: 12 MG/DL (ref 7–20)
CALCIUM SERPL-MCNC: 10.5 MG/DL (ref 8.3–10.6)
CHLORIDE BLD-SCNC: 98 MMOL/L (ref 99–110)
CLARITY: CLEAR
CO2: 26 MMOL/L (ref 21–32)
COLOR: YELLOW
CREAT SERPL-MCNC: 1.1 MG/DL (ref 0.6–1.1)
D DIMER: 0.38 UG/ML FEU (ref 0–0.6)
EKG ATRIAL RATE: 81 BPM
EKG DIAGNOSIS: NORMAL
EKG P AXIS: 46 DEGREES
EKG P-R INTERVAL: 104 MS
EKG Q-T INTERVAL: 326 MS
EKG QRS DURATION: 74 MS
EKG QTC CALCULATION (BAZETT): 378 MS
EKG R AXIS: 66 DEGREES
EKG T AXIS: 38 DEGREES
EKG VENTRICULAR RATE: 81 BPM
EOSINOPHILS ABSOLUTE: 0.1 K/UL (ref 0–0.6)
EOSINOPHILS RELATIVE PERCENT: 1.4 %
EPITHELIAL CELLS, UA: ABNORMAL /HPF (ref 0–5)
GFR AFRICAN AMERICAN: >60
GFR NON-AFRICAN AMERICAN: 54
GLUCOSE BLD-MCNC: 102 MG/DL (ref 70–99)
GLUCOSE URINE: NEGATIVE MG/DL
HCG(URINE) PREGNANCY TEST: NEGATIVE
HCT VFR BLD CALC: 44 % (ref 36–48)
HEMOGLOBIN: 15.2 G/DL (ref 12–16)
INFLUENZA A: NOT DETECTED
INFLUENZA B: NOT DETECTED
KETONES, URINE: NEGATIVE MG/DL
LACTIC ACID, SEPSIS: 0.8 MMOL/L (ref 0.4–1.9)
LEUKOCYTE ESTERASE, URINE: ABNORMAL
LIPASE: 49 U/L (ref 13–60)
LYMPHOCYTES ABSOLUTE: 2.1 K/UL (ref 1–5.1)
LYMPHOCYTES RELATIVE PERCENT: 30.7 %
MCH RBC QN AUTO: 33.9 PG (ref 26–34)
MCHC RBC AUTO-ENTMCNC: 34.6 G/DL (ref 31–36)
MCV RBC AUTO: 97.9 FL (ref 80–100)
MICROSCOPIC EXAMINATION: YES
MONOCYTES ABSOLUTE: 0.3 K/UL (ref 0–1.3)
MONOCYTES RELATIVE PERCENT: 4.4 %
MUCUS: ABNORMAL /LPF
NEUTROPHILS ABSOLUTE: 4.2 K/UL (ref 1.7–7.7)
NEUTROPHILS RELATIVE PERCENT: 62.5 %
NITRITE, URINE: NEGATIVE
OCCULT BLOOD DIAGNOSTIC: NORMAL
PDW BLD-RTO: 13.2 % (ref 12.4–15.4)
PH UA: 6 (ref 5–8)
PLATELET # BLD: 257 K/UL (ref 135–450)
PMV BLD AUTO: 8.8 FL (ref 5–10.5)
POTASSIUM REFLEX MAGNESIUM: 4.2 MMOL/L (ref 3.5–5.1)
PROTEIN UA: NEGATIVE MG/DL
RBC # BLD: 4.5 M/UL (ref 4–5.2)
RBC UA: ABNORMAL /HPF (ref 0–4)
SARS-COV-2 RNA, RT PCR: NOT DETECTED
SODIUM BLD-SCNC: 136 MMOL/L (ref 136–145)
SPECIFIC GRAVITY UA: 1.02 (ref 1–1.03)
TOTAL PROTEIN: 8.2 G/DL (ref 6.4–8.2)
TROPONIN: <0.01 NG/ML
URINE REFLEX TO CULTURE: ABNORMAL
URINE TYPE: ABNORMAL
UROBILINOGEN, URINE: 0.2 E.U./DL
WBC # BLD: 6.8 K/UL (ref 4–11)
WBC UA: ABNORMAL /HPF (ref 0–5)

## 2022-08-18 PROCEDURE — 85025 COMPLETE CBC W/AUTO DIFF WBC: CPT

## 2022-08-18 PROCEDURE — 74177 CT ABD & PELVIS W/CONTRAST: CPT

## 2022-08-18 PROCEDURE — 87636 SARSCOV2 & INF A&B AMP PRB: CPT

## 2022-08-18 PROCEDURE — 81001 URINALYSIS AUTO W/SCOPE: CPT

## 2022-08-18 PROCEDURE — 84484 ASSAY OF TROPONIN QUANT: CPT

## 2022-08-18 PROCEDURE — 83605 ASSAY OF LACTIC ACID: CPT

## 2022-08-18 PROCEDURE — 6360000004 HC RX CONTRAST MEDICATION: Performed by: REGISTERED NURSE

## 2022-08-18 PROCEDURE — 82270 OCCULT BLOOD FECES: CPT

## 2022-08-18 PROCEDURE — 93005 ELECTROCARDIOGRAM TRACING: CPT | Performed by: STUDENT IN AN ORGANIZED HEALTH CARE EDUCATION/TRAINING PROGRAM

## 2022-08-18 PROCEDURE — 85379 FIBRIN DEGRADATION QUANT: CPT

## 2022-08-18 PROCEDURE — 80053 COMPREHEN METABOLIC PANEL: CPT

## 2022-08-18 PROCEDURE — 99285 EMERGENCY DEPT VISIT HI MDM: CPT

## 2022-08-18 PROCEDURE — 71046 X-RAY EXAM CHEST 2 VIEWS: CPT

## 2022-08-18 PROCEDURE — 83690 ASSAY OF LIPASE: CPT

## 2022-08-18 PROCEDURE — 93010 ELECTROCARDIOGRAM REPORT: CPT | Performed by: INTERNAL MEDICINE

## 2022-08-18 PROCEDURE — 84703 CHORIONIC GONADOTROPIN ASSAY: CPT

## 2022-08-18 PROCEDURE — 36415 COLL VENOUS BLD VENIPUNCTURE: CPT

## 2022-08-18 PROCEDURE — 2580000003 HC RX 258: Performed by: REGISTERED NURSE

## 2022-08-18 RX ORDER — LIDOCAINE 50 MG/G
1 PATCH TOPICAL DAILY
Qty: 10 PATCH | Refills: 0 | Status: SHIPPED | OUTPATIENT
Start: 2022-08-18 | End: 2022-08-28

## 2022-08-18 RX ORDER — 0.9 % SODIUM CHLORIDE 0.9 %
1000 INTRAVENOUS SOLUTION INTRAVENOUS ONCE
Status: COMPLETED | OUTPATIENT
Start: 2022-08-18 | End: 2022-08-18

## 2022-08-18 RX ADMIN — IOPAMIDOL 75 ML: 755 INJECTION, SOLUTION INTRAVENOUS at 17:31

## 2022-08-18 RX ADMIN — SODIUM CHLORIDE 1000 ML: 9 INJECTION, SOLUTION INTRAVENOUS at 15:24

## 2022-08-18 ASSESSMENT — ENCOUNTER SYMPTOMS
BLOOD IN STOOL: 1
CONSTIPATION: 0
EYE PAIN: 0
SHORTNESS OF BREATH: 0
BACK PAIN: 0
ABDOMINAL PAIN: 1
VOMITING: 0
NAUSEA: 0
DIARRHEA: 1
CHEST TIGHTNESS: 0
RHINORRHEA: 0
COUGH: 0
SORE THROAT: 0

## 2022-08-18 ASSESSMENT — PAIN DESCRIPTION - LOCATION: LOCATION: SHOULDER

## 2022-08-18 ASSESSMENT — PAIN DESCRIPTION - PAIN TYPE: TYPE: ACUTE PAIN

## 2022-08-18 ASSESSMENT — PAIN - FUNCTIONAL ASSESSMENT: PAIN_FUNCTIONAL_ASSESSMENT: 0-10

## 2022-08-18 ASSESSMENT — PAIN SCALES - GENERAL: PAINLEVEL_OUTOF10: 3

## 2022-08-18 NOTE — ED PROVIDER NOTES
Emergency Department Encounter    Patient: Rufina Vann  MRN: [id number]  : 1980  Date of Evaluation: 2022  ED Provider:  Alisia Bauman MD    Triage Chief Complaint:   Other (Was seen here on the . Reports ongoing left sided rib pain that radiates to shoulder. Reports workup was negative. Reports now having bloody jelly stool. Reports this has happened before in . Reports stomach pain comes and goes. )    [place]:  Rufina Vann is a 38 y.o. female with history seen below presenting with left lower chest wall pain as well as diffuse abdominal pain, nausea vomiting, diarrhea and concern for blood in her stools. Patient states on the beginning of this month she moved awkwardly and felt a pop over the left side of her chest.  Patient states she presented to the ED in which a CT pulmonary embolism protocol was obtained showing negative for acute pulmonary embolus. CT abdomen pelvis was also obtained showing no acute intra-abdominal or pelvic pathology. There was right paramedian supraumbilical hernia containing omental fat but no contained bowel. Patient states the pain over the left lower chest wall continues. States there is point tenderness palpation of the left lower ribs on the lateral chest wall that is moderate in severity, constant, stabbing, worse with palpation without relieving factors. Patient denies any anterior chest pain, cough, sputum production, fevers or chills. States she has no increased lower extremity edema, orthopnea or signs of fluid overload. Denies history of DVTs or blood clots in the past states she does have chronic abdominal pain. States has been going on for several years but is worsened over the past 2 months. States over the past several months she will have intermittent episodes and on 8 bilious nonbloody vomiting. States she also has also had loose stools over the past several months.   Denies any blood or melena but states today she did notice that the stool appeared more rust in color. He did bring the stool with her and there does not appear to be any gross blood or melena in the stool. Patient denies any history of ulcerative colitis or Crohn's in the past.  States she has not recently followed with a GI physician. Denies recent falls or trauma other than what is stated above. Denies any neck or back pain. Denies headache, blurred vision, focal neurodeficits, motor or sensory changes. Patient denies any vaginal bleeding or discharge. States she did have an opioid abuse history but has not used in years. States she only uses marijuana currently. Denies significant alcohol use. ROS - see HPI, below listed is current ROS at time of my eval:  At least 14 systems reviewed, negative other HPI    Past Medical History:   Diagnosis Date    Acute ischemic colitis ([address] 10/20/15    Anesthesia complication     wakes from anesthesia with migraine    Asthma     Had real bad as child and then grew out of it and  then got pregnant it started acting up again.     Bipolar 1 disorder (HCC)     Bipolar affective disorder, current episode depressed ([address] 5/28/2015    Depression     Kidney stones     has had multiple kidney stones     Migraine     Nodule of left lung     pt has non calcified nodules on both lungs    Nodule of right lung     Opiate addiction ([address]     Past hx    Stage 3 chronic kidney disease ([address]     Wears contact lenses      Past Surgical History:   Procedure Laterality Date    COLONOSCOPY  10/20/15    possible ischemic colitis    CYSTOSCOPY Left 08/13/2020    left uretroscopy, with stone extraction    CYSTOSCOPY Left 10/1/2021    CYSTOSCOPY LEFT  URETERAL STENT INSERTION performed by Tank Oliveros MD at [address] [place] [address] / [address] [place] [address] / [facility] Left 8/13/2020    CYSTOSCOPY, URETEROSCOPY, HOLMIUM LASER LITHOTRIPSY, STONE EXTRACTION performed by Marco Antonio Cheney MD at [place] [address] 10/11/2016    Hysteroscopy, Novasure Ablation    LITHOTRIPSY      x 7    NEPHROLITHOTOMY Left 9/17/2020    LEFT PERCUTANEOUS NEPHROLITHOTOMY, CYSTOSCOPY WITH HOLMIUM LASER LITHOTRIPSY performed by Vaibhav Carrillo MD at [facility] OR    NEPHROSTOMY Left 9/17/2020    NEPHROSTOMY PERCUTANEOUS TUBE INSERTION performed by Vaibhav Carrillo MD at [address] [place] [address]  2007    TUBAL LIGATION  2001    [address]      X 2 then removed    URETER STENT PLACEMENT       Family History   Problem Relation Age of Onset    High Blood Pressure Father     Kidney Disease Father     Cancer Maternal Grandmother     Cancer Maternal Grandfather     Kidney Disease Maternal Grandfather     Cancer Paternal Grandmother     Cancer Paternal Grandfather     Cancer Other 3        ALL    Diabetes Maternal Uncle     COPD Mother     Depression Sister     Ovarian Cancer Sister      Social History     Socioeconomic History    Marital status:      Spouse name: Alicia Villavicencio    Number of children: 3    Years of education: 12    Highest education level: Not on file   Occupational History    Occupation: unemployed   Tobacco Use    Smoking status: Every Day     Packs/day: 0.50     Years: 21.00     Pack years: 10.50     Types: Cigarettes     Start date: 4/15/1995    Smokeless tobacco: Never   Vaping Use    Vaping Use: Never used   Substance and Sexual Activity    Alcohol use: Not Currently     Comment: quit 2 months ago.      Drug use: Yes     Types: Marijuana Rachel Theo)     Comment: occas    Sexual activity: Yes     Partners: Male   Other Topics Concern    Not on file   Social History Narrative    Not on file     Social Determinants of Health     Financial Resource Strain: Low Risk     Difficulty of Paying Living Expenses: Not hard at all   Food Insecurity: No Food Insecurity    Worried About [address] in the Last Year: Never true    [facility] [place] in the Last Year: Never true   Transportation Needs: No Transportation Needs    Lack of Transportation (Medical): No    Lack of Transportation (Non-Medical): No   Physical Activity: Not on file   Stress: Not on file   Social Connections: Not on file   Intimate Partner Violence: Not on file   Housing Stability: Low Risk     Unable to Pay for Housing in the Last Year: No    Number of Places Lived in the Last Year: 1    Unstable Housing in the Last Year: No     Current Facility-Administered Medications   Medication Dose Route Frequency Provider Last Rate Last Admin    0.9 % sodium chloride bolus  1,000 mL IntraVENous Once NILES Faulkner .9 mL/hr at 08/18/22 1524 1,000 mL at 08/18/22 1524    iopamidol (ISOVUE-370) 76 % injection 75 mL  75 mL IntraVENous ONCE PRN NILES Faulkner CNP         Current Outpatient Medications   Medication Sig Dispense Refill    ondansetron (ZOFRAN ODT) 4 MG disintegrating tablet Take 1 tablet by mouth every 8 hours as needed for Nausea 20 tablet 0    albuterol sulfate HFA (PROVENTIL;VENTOLIN;PROAIR) 108 (90 Base) MCG/ACT inhaler INHALE TWO PUFFS BY MOUTH EVERY 4 HOURS AS NEEDED FOR WHEEZING 18 g 0    venlafaxine (EFFEXOR XR) 37.5 MG extended release capsule TAKE 1 CAPSULE BY MOUTH DAILY FOR ONE WEEK THEN INCREASE TO 2 CAPULES BY MOUTH DAILY 60 capsule 2    busPIRone (BUSPAR) 30 MG tablet Take 30 mg by mouth 2 times daily      nicotine (NICODERM CQ) 21 MG/24HR Place 1 patch onto the skin daily 30 patch 3    metFORMIN (GLUCOPHAGE) 500 MG tablet Take 500 mg by mouth 2 times daily (with meals)      Lisdexamfetamine Dimesylate (VYVANSE) 40 MG CAPS Take 40 mg by mouth daily. ibuprofen (IBU) 600 MG tablet Take 1 tablet by mouth every 6 hours as needed for Pain 30 tablet 0    acetaminophen (APAP EXTRA STRENGTH) 500 MG tablet Take 2 tablets by mouth every 6 hours as needed for Pain 40 tablet 0    ondansetron (ZOFRAN ODT) 4 MG disintegrating tablet Take 1 tablet by mouth every 8 hours as needed for Nausea Let dissolve in mouth.  10 tablet 0    omeprazole (PRILOSEC) 40 MG delayed release capsule Take 1 capsule by mouth every morning (before breakfast) 90 capsule 1    diphenhydrAMINE (BENADRYL) 50 MG capsule Take 50 mg by mouth nightly as needed for Sleep      buprenorphine-naloxone (SUBOXONE) 8-2 MG SUBL SL tablet Place 1 tablet under the tongue daily. Pt takes 12 mg daily      QUEtiapine (SEROQUEL XR) 150 MG TB24 extended release tablet Take 150 mg by mouth nightly       Facility-Administered Medications Ordered in Other Encounters   Medication Dose Route Frequency Provider Last Rate Last Admin    lactated ringers infusion   IntraVENous Continuous Román Keene MD   New Bag at 10/01/21 1349    sodium chloride flush 0.9 % injection 10 mL  10 mL IntraVENous 2 times per day Román Keene MD        sodium chloride flush 0.9 % injection 10 mL  10 mL IntraVENous PRN Román Keene MD         No Known Allergies    Nursing Notes Reviewed    Physical Exam:  Triage VS:    ED Triage Vitals [08/18/22 1342]   Enc Vitals Group      /81      Heart Rate (!) 101      Resp 18      Temp 98.3 °F (36.8 °C)      Temp Source Oral      SpO2 98 %      Weight 155 lb (70.3 kg)      Height 5' 2\" (1.575 m)      Head Circumference       Peak Flow       Pain Score       Pain Loc       Pain Edu? Excl. in [address]? My pulse ox interpretation is - normal    General appearance:  No acute distress. Skin:  Warm. Dry. Eye:  Extraocular movements intact. Ears, nose, mouth and throat:  Oral mucosa moist   Neck:  Trachea midline. Extremity:  No swelling. Normal ROM     Heart:  Regular rate and rhythm, normal S1 & S2, no extra heart sounds. Perfusion:  intact  Respiratory:  Lungs clear to auscultation bilaterally. Respirations nonlabored. Patient does have reproducible tenderness palpation of the left lateral chest wall along the lower ribs, no overall overlying erythema, rashes or lesions     Abdominal:  Normal bowel sounds. Soft. Overall mild subjective discomfort with palpation of the abdomen without any rebound or guarding, no flank pain, no CVA tenderness, no central spinal tenderness, no tenderness palpation of the pelvis  Back:  No CVA tenderness to palpation no tenderness palpation along the CT or L-spine  Neurological:  Alert and oriented times 3. No focal neuro deficits.              Psychiatric:  Appropriate    I have reviewed and interpreted all of the currently available lab results from this visit (if applicable):  Results for orders placed or performed during the hospital encounter of 08/18/22   COVID-19 & Influenza Combo    Specimen: Nasopharyngeal Swab   Result Value Ref Range    SARS-CoV-2 RNA, RT PCR NOT DETECTED NOT DETECTED    INFLUENZA A NOT DETECTED NOT DETECTED    INFLUENZA B NOT DETECTED NOT DETECTED   Urinalysis with Reflex to Culture    Specimen: Urine   Result Value Ref Range    Color, UA Yellow Straw/Yellow    Clarity, UA Clear Clear    Glucose, Ur Negative Negative mg/dL    Bilirubin Urine Negative Negative    Ketones, Urine Negative Negative mg/dL    Specific Gravity, UA 1.020 1.005 - 1.030    Blood, Urine SMALL (A) Negative    pH, UA 6.0 5.0 - 8.0    Protein, UA Negative Negative mg/dL    Urobilinogen, Urine 0.2 <2.0 E.U./dL    Nitrite, Urine Negative Negative    Leukocyte Esterase, Urine SMALL (A) Negative    Microscopic Examination YES     Urine Type NotGiven     Urine Reflex to Culture Not Indicated    CBC with Auto Differential   Result Value Ref Range    WBC 6.8 4.0 - 11.0 K/uL    RBC 4.50 4.00 - 5.20 M/uL    Hemoglobin 15.2 12.0 - 16.0 g/dL    Hematocrit 44.0 36.0 - 48.0 %    MCV 97.9 80.0 - 100.0 fL    MCH 33.9 26.0 - 34.0 pg    MCHC 34.6 31.0 - 36.0 g/dL    RDW 13.2 12.4 - 15.4 %    Platelets 481 897 - 476 K/uL    MPV 8.8 5.0 - 10.5 fL    Neutrophils % 62.5 %    Lymphocytes % 30.7 %    Monocytes % 4.4 %    Eosinophils % 1.4 %    Basophils % 1.0 %    Neutrophils Absolute 4.2 1.7 - 7.7 K/uL    Lymphocytes Absolute 2.1 1.0 - 5.1 K/uL    Monocytes Absolute 0.3 0.0 - 1.3 K/uL    Eosinophils Absolute 0.1 0.0 - 0.6 K/uL    Basophils Absolute 0.1 0.0 - 0.2 K/uL   Comprehensive Metabolic Panel w/ Reflex to MG   Result Value Ref Range    Sodium 136 136 - 145 mmol/L    Potassium reflex Magnesium 4.2 3.5 - 5.1 mmol/L    Chloride 98 (L) 99 - 110 mmol/L    CO2 26 21 - 32 mmol/L    Anion Gap 12 3 - 16    Glucose 102 (H) 70 - 99 mg/dL    BUN 12 7 - 20 mg/dL    Creatinine 1.1 0.6 - 1.1 mg/dL    GFR Non-African American 54 (A) >60    GFR African American >60 >60    Calcium 10.5 8.3 - 10.6 mg/dL    Total Protein 8.2 6.4 - 8.2 g/dL    Albumin 4.6 3.4 - 5.0 g/dL    Albumin/Globulin Ratio 1.3 1.1 - 2.2    Total Bilirubin 0.5 0.0 - 1.0 mg/dL    Alkaline Phosphatase 113 40 - 129 U/L    ALT 11 10 - 40 U/L    AST 15 15 - 37 U/L   Lipase   Result Value Ref Range    Lipase 49.0 13.0 - 60.0 U/L   Troponin   Result Value Ref Range    Troponin <0.01 <0.01 ng/mL   D-Dimer, Quantitative   Result Value Ref Range    D-Dimer, Quant 0.38 0.00 - 0.60 ug/mL FEU   Microscopic Urinalysis   Result Value Ref Range    Mucus, UA 2+ (A) None Seen /LPF    WBC, UA 6-9 (A) 0 - 5 /HPF    RBC, UA 11-20 (A) 0 - 4 /HPF    Epithelial Cells, UA 11-20 (A) 0 - 5 /HPF    Bacteria, UA Rare (A) None Seen /HPF   EKG 12 Lead   Result Value Ref Range    Ventricular Rate 81 BPM    Atrial Rate 81 BPM    P-R Interval 104 ms    QRS Duration 74 ms    Q-T Interval 326 ms    QTc Calculation (Bazett) 378 ms    P Axis 46 degrees    R Axis 66 degrees    T Axis 38 degrees    Diagnosis       Sinus rhythm with short PROtherwise normal ECGWhen compared with ECG of 10-AUG-2022 23:42,No significant change was found      Radiographs (if obtained):  Radiologist's Report Reviewed:  XR CHEST (2 VW)    Result Date: 8/18/2022  EXAMINATION: TWO XRAY VIEWS OF THE CHEST 8/18/2022 12:52 pm COMPARISON: October 1, 2021.  HISTORY: ORDERING SYSTEM PROVIDED HISTORY: pain TECHNOLOGIST PROVIDED HISTORY: Reason for exam:->pain Reason for Exam: rt & lt lower gideon pain FINDINGS: Upright frontal and lateral view chest radiographs were obtained. The heart size, mediastinal contour and pleural spaces are within normal limits. The lungs are clear. There is no focal consolidation or pneumothorax. The pulmonary vascular pattern is within normal limits. No significant thoracic osseous abnormality. Clear lungs. No acute cardiopulmonary abnormality. EKG (if obtained): (All EKG's are interpreted by myself in the absence of a cardiologist)    Normal sinus rhythm, ventricular rate 81, WI interval 104, QRS duration 74, QTc 738, no significant ST elevation or depression, no significant ischemic change    MDM:    49-year-old female presenting with history seen above. Patient is mildly tachycardic at 101 on presentation. Otherwise vitals are reassuring and patient afebrile satting on room air. Patient overall very well-appearing, lungs are clear to auscultation with cardiac exam is reassuring. Patient does have some reproducible tenderness palpation over the left lateral chest wall along the lower ribs without overlying erythema, fluctuance, purulence or rashes. Patient describes mild subjective discomfort diffusely in the abdomen and absolutely no rebound or guarding, no flank pain, no CVA tenderness, no central spinal tenderness. CBC is reassuring without leukocytosis. Hemoglobin is within normal limits. CMP overall reassuring. Lactate is not elevated. EKG is nonacute and Amy Valeria is within normal limits. Do not believe repeat May Valeria is necessary at this time given duration of symptoms without worsening symptoms over the past 24 hours. D-dimer is nonelevated. Rapid flu and COVID are negative. UA is dirty sample. Patient has rare bacteria with mildly elevated whites. Patient denies any urinary symptoms at this time. We will hold on antibiotics and discussed if she begins having symptoms to return to the ED. Pregnancy testing is negative. Hemoccult blood is negative. Lactate is within normal limits. Chest x-ray is nonacute. CT abdomen pelvis shows nonobstructing bilateral renal calculi with no acute findings. Patient on reevaluation vitals have remained stable and tachycardia is improved and heart rate is 88. Patient is overall very well-appearing. Patient is reassured by work-up and I discussed with patient the importance of following up with her primary care physician as well as following up with GI. Patient states she does feel safe for discharge. I discussed strict return precautions with patient as well as close follow-up and patient agreeable to plan and discharged home. Clinical Impression:  1. Lower abdominal pain          Comment: Please note this report has been produced using speech recognition software and may contain errors related to that system including errors in grammar, punctuation, and spelling, as well as words and phrases that may be inappropriate. Efforts were made to edit the dictations.         Angelia Denny MD  08/25/22 [id number]

## 2022-08-18 NOTE — ED PROVIDER NOTES
[address] ED  EMERGENCY DEPARTMENT ENCOUNTER        Pt Name: Ines Felty  MRN: [id number]  [place] 1980  Date of evaluation: 8/18/2022  Provider: NILES Arizmendi - JOSE  PCP: SANTIAGO Emerson    This patient was seen and evaluated by the attending physician Karen Barajas MD.    I have evaluated this patient. My supervising physician was available for consultation. CHIEF COMPLAINT       Chief Complaint   Patient presents with    Other     Was seen here on the 10th. Reports ongoing left sided rib pain that radiates to shoulder. Reports workup was negative. Reports now having bloody jelly stool. Reports this has happened before in 2015. Reports stomach pain comes and goes. HISTORY OF PRESENT ILLNESS   (Location/Symptom, Timing/Onset, Context/Setting, Quality, Duration, Modifying Factors, Severity)  Note limiting factors. Ines Felty is a 38 y.o. female who presents via private car for abdominal pain. Onset was intermittent over the last 4 days. Duration has been since the onset. Context includes patient is reporting that she was seen in the emergency department several weeks ago for a left-sided lower wall chest pain. She states her pain was initially better but has continued to be persistent. She is endorsing over the last 4 days that she had a fever, most recently 4 days ago was 100.4. She states her pain has been more to the left lower quadrant of her abdomen than any other location. She was recently treated for UTI and did complete all of her antibiotics. She does have a history of kidney stones. Denies any vaginal discharge or concerns for sexually transmitted infections she endorses this morning that she had some cramping abdominal pain and then had 1 bowel movement where the stool was dark, bloody and jelly in consistency. She denies any current abdominal pain.   She denies any chest pain, shortness of breath, nausea vomiting diarrhea or constipation. She is currently denying any urinary symptoms including flank pain, dysuria, urgency, frequency or hematuria. . Quality is cramping with radiation to her lower abdomen. Alleviating factors include having a bowel movement. Aggravating factors include nothing. Pain is 3/10. Nothing has been used for pain today. Chart review reveals pt has significant PMHx of acute ischemic colitis, asthma, bipolar, depression, kidney stones, migraine, lung nodules, opiate addiction, stage III chronic kidney disease. They take Tylenol, albuterol, Suboxone, BuSpar, metformin, omeprazole, Seroquel, Effexor, Vyvanse. Nursing Notes were all reviewed and agreed with or any disagreements were addressed  in the HPI. Pt was seen during the [place] 19 pandemic. Appropriate PPE worn by ME during patient encounters. Pt seen during a time with constrained hospital bed capacity and other potential inpatient and outpatient resources were constrained due to the viral pandemic. REVIEW OF SYSTEMS    (2-9 systems for level 4, 10 or more for level 5)     Review of Systems   Constitutional:  Positive for fever. Negative for chills and diaphoresis. HENT:  Negative for congestion, rhinorrhea and sore throat. Eyes:  Negative for pain and visual disturbance. Respiratory:  Negative for cough, chest tightness and shortness of breath. Cardiovascular:  Positive for chest pain. Negative for palpitations and leg swelling. Left-sided lower chest pain   Gastrointestinal:  Positive for abdominal pain, blood in stool and diarrhea. Negative for constipation, nausea and vomiting. Genitourinary:  Negative for dysuria, flank pain, frequency, pelvic pain, urgency, vaginal bleeding and vaginal discharge. Musculoskeletal:  Negative for back pain and neck pain. Skin:  Negative for rash and wound. Neurological:  Negative for dizziness and light-headedness. Positives and Pertinent negatives as per HPI.   Except as noted abovein the ROS, all other systems were reviewed and negative. PAST MEDICAL HISTORY     Past Medical History:   Diagnosis Date    Acute ischemic colitis ([address] 10/20/15    Anesthesia complication     wakes from anesthesia with migraine    Asthma     Had real bad as child and then grew out of it and  then got pregnant it started acting up again.     Bipolar 1 disorder (HCC)     Bipolar affective disorder, current episode depressed ([address] 5/28/2015    Depression     Kidney stones     has had multiple kidney stones     Migraine     Nodule of left lung     pt has non calcified nodules on both lungs    Nodule of right lung     Opiate addiction ([address]     Past hx    Stage 3 chronic kidney disease ([address]     Wears contact lenses          SURGICAL HISTORY     Past Surgical History:   Procedure Laterality Date    COLONOSCOPY  10/20/15    possible ischemic colitis    CYSTOSCOPY Left 08/13/2020    left uretroscopy, with stone extraction    CYSTOSCOPY Left 10/1/2021    CYSTOSCOPY LEFT  URETERAL STENT INSERTION performed by Fili Starks MD at [facility] / [address] [place] [address] / [facility] 8/13/2020    CYSTOSCOPY, URETEROSCOPY, HOLMIUM LASER LITHOTRIPSY, STONE EXTRACTION performed by Alan Quach MD at [facility]  10/11/2016    Hysteroscopy, Novasure Ablation    LITHOTRIPSY      x 7    NEPHROLITHOTOMY Left 9/17/2020    LEFT PERCUTANEOUS NEPHROLITHOTOMY, CYSTOSCOPY WITH HOLMIUM LASER LITHOTRIPSY performed by Brittney Dillon MD at [facility] OR    NEPHROSTOMY Left 9/17/2020    NEPHROSTOMY PERCUTANEOUS TUBE INSERTION performed by Brittney Dillon MD at [address] [facility] [address]      X 2 then removed    URETER STENT PLACEMENT           CURRENTMEDICATIONS       Previous Medications    ACETAMINOPHEN (APAP EXTRA STRENGTH) 500 MG TABLET    Take 2 tablets by mouth every 6 hours as needed for Pain ALBUTEROL SULFATE HFA (PROVENTIL;VENTOLIN;PROAIR) 108 (90 BASE) MCG/ACT INHALER    INHALE TWO PUFFS BY MOUTH EVERY 4 HOURS AS NEEDED FOR WHEEZING    BUPRENORPHINE-NALOXONE (SUBOXONE) 8-2 MG SUBL SL TABLET    Place 1 tablet under the tongue daily. Pt takes 12 mg daily    BUSPIRONE (BUSPAR) 30 MG TABLET    Take 30 mg by mouth 2 times daily    DIPHENHYDRAMINE (BENADRYL) 50 MG CAPSULE    Take 50 mg by mouth nightly as needed for Sleep    IBUPROFEN (IBU) 600 MG TABLET    Take 1 tablet by mouth every 6 hours as needed for Pain    LISDEXAMFETAMINE DIMESYLATE (VYVANSE) 40 MG CAPS    Take 40 mg by mouth daily. METFORMIN (GLUCOPHAGE) 500 MG TABLET    Take 500 mg by mouth 2 times daily (with meals)    NICOTINE (NICODERM CQ) 21 MG/24HR    Place 1 patch onto the skin daily    OMEPRAZOLE (PRILOSEC) 40 MG DELAYED RELEASE CAPSULE    Take 1 capsule by mouth every morning (before breakfast)    ONDANSETRON (ZOFRAN ODT) 4 MG DISINTEGRATING TABLET    Take 1 tablet by mouth every 8 hours as needed for Nausea Let dissolve in mouth. ONDANSETRON (ZOFRAN ODT) 4 MG DISINTEGRATING TABLET    Take 1 tablet by mouth every 8 hours as needed for Nausea    QUETIAPINE (SEROQUEL XR) 150 MG TB24 EXTENDED RELEASE TABLET    Take 150 mg by mouth nightly    VENLAFAXINE (EFFEXOR XR) 37.5 MG EXTENDED RELEASE CAPSULE    TAKE 1 CAPSULE BY MOUTH DAILY FOR ONE WEEK THEN INCREASE TO 2 CAPULES BY MOUTH DAILY         ALLERGIES     Patient has no known allergies.     FAMILYHISTORY       Family History   Problem Relation Age of Onset    High Blood Pressure Father     Kidney Disease Father     Cancer Maternal Grandmother     Cancer Maternal Grandfather     Kidney Disease Maternal Grandfather     Cancer Paternal Grandmother     Cancer Paternal Grandfather     Cancer Other 3        ALL    Diabetes Maternal Uncle     COPD Mother     Depression Sister     Ovarian Cancer Sister           SOCIAL HISTORY       Social History     Socioeconomic History    Marital status:      Spouse name: Gary Enriquez    Number of children: 3    Years of education: 12    Highest education level: None   Occupational History    Occupation: unemployed   Tobacco Use    Smoking status: Every Day     Packs/day: 0.50     Years: 21.00     Pack years: 10.50     Types: Cigarettes     Start date: 4/15/1995    Smokeless tobacco: Never   Vaping Use    Vaping Use: Never used   Substance and Sexual Activity    Alcohol use: Not Currently     Comment: quit 2 months ago. Drug use: Yes     Types: Marijuana Berneta Theo)     Comment: occas    Sexual activity: Yes     Partners: Male     Social Determinants of Health     Financial Resource Strain: Low Risk     Difficulty of Paying Living Expenses: Not hard at all   Food Insecurity: No Food Insecurity    Worried About [employer] [employer] in the Last Year: Never true    [employer] in the Last Year: Never true   Transportation Needs: No Transportation Needs    Lack of Transportation (Medical): No    Lack of Transportation (Non-Medical): No   Housing Stability: Low Risk     Unable to Pay for Housing in the Last Year: No    Number of [place] in the Last Year: 1    Unstable Housing in the Last Year: No       SCREENINGS    [place] Coma Scale  Eye Opening: Spontaneous  Best Verbal Response: Oriented  Best Motor Response: Obeys commands  [place] Coma Scale Score: 15        PHYSICAL EXAM    (up to 7 for level 4, 8 or more for level 5)     ED Triage Vitals [08/18/22 1342]   BP Temp Temp Source Heart Rate Resp SpO2 Height Weight   118/81 98.3 °F (36.8 °C) Oral (!) 101 18 98 % 5' 2\" (1.575 m) 155 lb (70.3 kg)       Physical Exam  Vitals and nursing note reviewed. Constitutional:       Appearance: Normal appearance. She is not ill-appearing or diaphoretic. HENT:      Head: Normocephalic and atraumatic.       Right Ear: External ear normal.      Left Ear: External ear normal.      Mouth/Throat:      Mouth: Mucous membranes are moist.      Pharynx: Oropharynx is clear.   Eyes:      General:         Right eye: No discharge. Left eye: No discharge. Pupils: Pupils are equal, round, and reactive to light. Cardiovascular:      Rate and Rhythm: Regular rhythm. Tachycardia present. Pulses: Normal pulses. Heart sounds: Normal heart sounds. No murmur heard. No friction rub. No gallop. Pulmonary:      Effort: Pulmonary effort is normal. No respiratory distress. Breath sounds: Normal breath sounds. No stridor. No wheezing, rhonchi or rales. Abdominal:      General: Abdomen is flat. Bowel sounds are normal.      Palpations: Abdomen is soft. Tenderness: abdominal tenderness in the left upper quadrant and left lower quadrant There is no right CVA tenderness, left CVA tenderness, guarding or rebound. Negative signs include Bennett's sign, Rovsing's sign, McBurney's sign, psoas sign and obturator sign. Genitourinary:     Rectum: Normal. No mass, tenderness, anal fissure, external hemorrhoid or internal hemorrhoid. Normal anal tone. Musculoskeletal:         General: Normal range of motion. Cervical back: Normal range of motion and neck supple. Skin:     General: Skin is warm and dry. Capillary Refill: Capillary refill takes less than 2 seconds. Neurological:      General: No focal deficit present. Mental Status: She is alert and oriented to person, place, and time.    Psychiatric:         Mood and Affect: Mood normal.         Behavior: Behavior normal.     PHYSICAL EXAM  /73   Pulse 87   Temp 98.3 °F (36.8 °C) (Oral)   Resp 16   Ht 5' 2\" (1.575 m)   Wt 155 lb (70.3 kg)   SpO2 99%   BMI 28.35 kg/m²       DIAGNOSTIC RESULTS   LABS:    Labs Reviewed   URINALYSIS WITH REFLEX TO CULTURE - Abnormal; Notable for the following components:       Result Value    Blood, Urine SMALL (*)     Leukocyte Esterase, Urine SMALL (*)     All other components within normal limits   COMPREHENSIVE METABOLIC PANEL W/ REFLEX TO MG FOR LOW K - Abnormal; Notable for the following components:    Chloride 98 (*)     Glucose 102 (*)     GFR Non- 54 (*)     All other components within normal limits   MICROSCOPIC URINALYSIS - Abnormal; Notable for the following components:    Mucus, UA 2+ (*)     WBC, UA 6-9 (*)     RBC, UA 11-20 (*)     Epithelial Cells, UA 11-20 (*)     Bacteria, UA Rare (*)     All other components within normal limits   COVID-19 & INFLUENZA COMBO   CBC WITH AUTO DIFFERENTIAL   LIPASE   TROPONIN   LACTATE, SEPSIS   D-DIMER, QUANTITATIVE   BLOOD OCCULT STOOL DIAGNOSTIC    Narrative:     ORDER#: [id number]                          ORDERED BY: Guanaco Nguễyn  SOURCE: Stool                              COLLECTED:  08/18/22 14:45  ANTIBIOTICS AT GIA.:                      RECEIVED :  08/18/22 16:51   PREGNANCY, URINE   LACTATE, SEPSIS       All other labs were within normal range or not returned as of this dictation. EKG: All EKG's are interpreted by the Emergency Department Physician who either signs orCo-signs this chart in the absence of a cardiologist.  Please see their note for interpretation of EKG. RADIOLOGY:   Non-plain film images such as CT, Ultrasound and MRI are read by the radiologist. Plain radiographic images are visualized andpreliminarily interpreted by the  ED Provider with the below findings:        Interpretation perthe Radiologist below, if available at the time of this note:    CT ABDOMEN PELVIS W IV CONTRAST Additional Contrast? None   Final Result   Nonobstructing bilateral renal calculi. No acute findings. There has been   no significant change from the CT study recently performed. XR CHEST (2 VW)   Final Result   Clear lungs. No acute cardiopulmonary abnormality. XR CHEST (2 VW)    Result Date: 8/18/2022  EXAMINATION: TWO XRAY VIEWS OF THE CHEST 8/18/2022 12:52 pm COMPARISON: October 1, 2021.  HISTORY: ORDERING SYSTEM PROVIDED HISTORY: pain TECHNOLOGIST PROVIDED HISTORY: Reason for exam:->pain Reason for Exam: rt & lt lower gideon pain FINDINGS: Upright frontal and lateral view chest radiographs were obtained. The heart size, mediastinal contour and pleural spaces are within normal limits. The lungs are clear. There is no focal consolidation or pneumothorax. The pulmonary vascular pattern is within normal limits. No significant thoracic osseous abnormality. Clear lungs. No acute cardiopulmonary abnormality.           PROCEDURES   Unless otherwise noted below, none     Procedures    CRITICAL CARE TIME   N/A    CONSULTS:  None      EMERGENCY DEPARTMENT COURSE and DIFFERENTIALDIAGNOSIS/MDM:   Vitals:    Vitals:    08/18/22 1530 08/18/22 1545 08/18/22 1630 08/18/22 1703   BP: 112/81  112/72 112/73   Pulse:   87    Resp:   16    Temp:       TempSrc:       SpO2: 100% 98%  99%   Weight:       Height:           Patient was given thefollowing medications:  Medications   0.9 % sodium chloride bolus (1,000 mLs IntraVENous New Bag 8/18/22 1524)   iopamidol (ISOVUE-370) 76 % injection 75 mL (75 mLs IntraVENous Given 8/18/22 1731)       PDMP Monitoring:    Last PDMP Shlomo as Reviewed [facility]):  Review User Review Instant Review Result            Urine Drug Screenings (1 yr)       Urine Drug Screen  Collected: 1/31/2019  9:18 AM (Edited Result - FINAL)   Narrative: Performed at:  [facility] [address], [facility], [address]   Phone [phone]             Urine Drug Screen  Collected: 4/14/2018  9:03 PM (Final result)   Narrative: Performed at:  [facility]) - [facility],  [place], [facility]   Phone [phone]             Drug screen multi urine  Collected: 5/10/2017  1:02 AM (Final result)              Drug screen multi urine  Collected: 8/30/2014  2:30 AM (Final result)                  Medication Contract and Consent for Opioid Use Documents Filed        No documents found                    MDM:   This patient was seen and evaluated by myself and my attending physician. She presents to the emergency department today with complaints of left lower quadrant abdominal pain, blood in her stool and left lower side chest pain. On exam she is alert and oriented, hemodynamically stable and nontoxic in appearance. She will have a work-up in the emergency department consisting of laboratory studies and imaging. She will be given IV fluids but denies need for pain control at this time however states she will notify staff of this changes. Laboratory studies and images were evaluated and reviewed with the patient. EKG was interpreted by the attending physician. Negative, D-dimer negative, blood occult stool negative CBC grossly negative, BMP reveals chloride 98, glucose 102, GFR 54. Lipase 49 troponin negative. Urinalysis reveals small blood, small leukocytes, 2+ mucus, 6-9 WBCs, 11-20 RBCs, 11-20 epithelial cells and rare bacteria. There is a urine culture pending. Pregnancy negative. COVID influenza negative. CT abdomen pelvis with IV contrast interpreted by the radiologist for nonobstructing bilateral renal calculi. No acute findings. No significant change from the CT study recently performed.  's x-ray interpreted by the radiologist for clear lungs. No acute cardiopulmonary abnormality. Initial presenting tachycardia improved after patient received 1 L normal saline. On revaluation the patient did report an improvement of her abdominal cramping. I discussed with her her radiology as well as laboratory studies and instructed her I think it is important for her to follow-up with GI at this time. I did discuss with her treatment with lidocaine patches for the left-sided rib pain that she has been experiencing for several weeks. She has been seen for this already and was negative for pulmonary embolism and denies new injuries.   I discussed with her very strict return precautions for the emergency department including but not limited to chest pain, shortness of breath, fevers, worsening abdominal pain, noticing more blood in her stool or other concerning symptoms. A urine culture is pending and the patient continued to deny any urinary symptoms. I informed her that if her urine culture was positive she will be notified and antibiotics initiated at that time. Patient verbalized understanding of all discharge teaching and was ultimately discharged in a stable condition with all questions answered. Is this patient to be included in the SEP-1 Core Measure due to severe sepsis or septic shock? No   Exclusion criteria - the patient is NOT to be included for SEP-1 Core Measure due to:  2+ SIRS criteria are not met    Discharge Time out:  CC Reviewed Yes   Test Results Yes     Vitals:    08/18/22 1703   BP: 112/73   Pulse:    Resp:    Temp:    SpO2: 99%              FINAL IMPRESSION      1. Lower abdominal pain          DISPOSITION/PLAN   DISPOSITION Decision To Discharge 08/18/2022 06:37:02 PM      PATIENT REFERREDTO:  Elham Pace  [address] [address]  [phone]      As needed, If symptoms worsen    [facility]  [address] [facility]  [phone]    As needed, If symptoms worsen    [address] [place] [address]  [phone]          DISCHARGE MEDICATIONS:  New Prescriptions    LIDOCAINE (LIDODERM) 5 %    Place 1 patch onto the skin daily for 10 days 12 hours on, 12 hours off.        DISCONTINUED MEDICATIONS:  Discontinued Medications    No medications on file              (Please note that portions ofthis note were completed with a voice recognition program.  Efforts were made to edit the dictations but occasionally words are mis-transcribed.)    NILES Vázquez CNP (electronically signed)       NILES Vázquez CNP  08/18/22 8883

## 2023-01-18 ENCOUNTER — APPOINTMENT (OUTPATIENT)
Dept: CT IMAGING | Age: 43
End: 2023-01-18
Payer: COMMERCIAL

## 2023-01-18 ENCOUNTER — HOSPITAL ENCOUNTER (EMERGENCY)
Age: 43
Discharge: HOME | End: 2023-01-18
Payer: COMMERCIAL

## 2023-01-18 VITALS
TEMPERATURE: 97.9 F | RESPIRATION RATE: 18 BRPM | DIASTOLIC BLOOD PRESSURE: 79 MMHG | HEART RATE: 76 BPM | OXYGEN SATURATION: 100 % | SYSTOLIC BLOOD PRESSURE: 127 MMHG

## 2023-01-18 DIAGNOSIS — N30.01 ACUTE CYSTITIS WITH HEMATURIA: ICD-10-CM

## 2023-01-18 DIAGNOSIS — K42.9 PERIUMBILICAL HERNIA: ICD-10-CM

## 2023-01-18 DIAGNOSIS — R10.9 FLANK PAIN: Primary | ICD-10-CM

## 2023-01-18 DIAGNOSIS — N20.0 BILATERAL NEPHROLITHIASIS: ICD-10-CM

## 2023-01-18 LAB
A/G RATIO: 1.7 (ref 1.1–2.2)
ALBUMIN SERPL-MCNC: 4.7 G/DL (ref 3.4–5)
ALP BLD-CCNC: 113 U/L (ref 40–129)
ALT SERPL-CCNC: 11 U/L (ref 10–40)
ANION GAP SERPL CALCULATED.3IONS-SCNC: 10 MMOL/L (ref 3–16)
AST SERPL-CCNC: 16 U/L (ref 15–37)
BACTERIA: ABNORMAL /HPF
BASOPHILS ABSOLUTE: 0.1 K/UL (ref 0–0.2)
BASOPHILS RELATIVE PERCENT: 1.3 %
BILIRUB SERPL-MCNC: 0.8 MG/DL (ref 0–1)
BILIRUBIN URINE: NEGATIVE
BLOOD, URINE: ABNORMAL
BUN BLDV-MCNC: 16 MG/DL (ref 7–20)
CALCIUM SERPL-MCNC: 10.3 MG/DL (ref 8.3–10.6)
CHLORIDE BLD-SCNC: 99 MMOL/L (ref 99–110)
CLARITY: ABNORMAL
CO2: 26 MMOL/L (ref 21–32)
COLOR: YELLOW
CREAT SERPL-MCNC: 1 MG/DL (ref 0.6–1.1)
CRYSTALS, UA: ABNORMAL /HPF
EOSINOPHILS ABSOLUTE: 0.2 K/UL (ref 0–0.6)
EOSINOPHILS RELATIVE PERCENT: 2.9 %
EPITHELIAL CELLS, UA: ABNORMAL /HPF (ref 0–5)
GFR SERPL CREATININE-BSD FRML MDRD: >60 ML/MIN/{1.73_M2}
GLUCOSE BLD-MCNC: 111 MG/DL (ref 70–99)
GLUCOSE URINE: NEGATIVE MG/DL
HCG QUALITATIVE: NEGATIVE
HCT VFR BLD CALC: 42.4 % (ref 36–48)
HEMOGLOBIN: 14.4 G/DL (ref 12–16)
KETONES, URINE: NEGATIVE MG/DL
LACTIC ACID: 1.3 MMOL/L (ref 0.4–2)
LEUKOCYTE ESTERASE, URINE: ABNORMAL
LIPASE: 64 U/L (ref 13–60)
LYMPHOCYTES ABSOLUTE: 3.1 K/UL (ref 1–5.1)
LYMPHOCYTES RELATIVE PERCENT: 36.7 %
MCH RBC QN AUTO: 34.4 PG (ref 26–34)
MCHC RBC AUTO-ENTMCNC: 33.9 G/DL (ref 31–36)
MCV RBC AUTO: 101.4 FL (ref 80–100)
MICROSCOPIC EXAMINATION: YES
MONOCYTES ABSOLUTE: 0.4 K/UL (ref 0–1.3)
MONOCYTES RELATIVE PERCENT: 4.6 %
MUCUS: ABNORMAL /LPF
NEUTROPHILS ABSOLUTE: 4.7 K/UL (ref 1.7–7.7)
NEUTROPHILS RELATIVE PERCENT: 54.5 %
NITRITE, URINE: NEGATIVE
PDW BLD-RTO: 13.4 % (ref 12.4–15.4)
PH UA: 7 (ref 5–8)
PLATELET # BLD: 290 K/UL (ref 135–450)
PMV BLD AUTO: 8.5 FL (ref 5–10.5)
POTASSIUM REFLEX MAGNESIUM: 3.6 MMOL/L (ref 3.5–5.1)
PROTEIN UA: NEGATIVE MG/DL
RBC # BLD: 4.19 M/UL (ref 4–5.2)
RBC UA: ABNORMAL /HPF (ref 0–4)
SODIUM BLD-SCNC: 135 MMOL/L (ref 136–145)
SPECIFIC GRAVITY UA: 1.01 (ref 1–1.03)
TOTAL PROTEIN: 7.4 G/DL (ref 6.4–8.2)
URINE REFLEX TO CULTURE: YES
URINE TYPE: ABNORMAL
UROBILINOGEN, URINE: 0.2 E.U./DL
WBC # BLD: 8.5 K/UL (ref 4–11)
WBC UA: ABNORMAL /HPF (ref 0–5)

## 2023-01-18 PROCEDURE — 84703 CHORIONIC GONADOTROPIN ASSAY: CPT

## 2023-01-18 PROCEDURE — 99284 EMERGENCY DEPT VISIT MOD MDM: CPT

## 2023-01-18 PROCEDURE — 85025 COMPLETE CBC W/AUTO DIFF WBC: CPT

## 2023-01-18 PROCEDURE — 83690 ASSAY OF LIPASE: CPT

## 2023-01-18 PROCEDURE — 74176 CT ABD & PELVIS W/O CONTRAST: CPT

## 2023-01-18 PROCEDURE — 96374 THER/PROPH/DIAG INJ IV PUSH: CPT

## 2023-01-18 PROCEDURE — 6370000000 HC RX 637 (ALT 250 FOR IP): Performed by: PHYSICIAN ASSISTANT

## 2023-01-18 PROCEDURE — 2580000003 HC RX 258: Performed by: PHYSICIAN ASSISTANT

## 2023-01-18 PROCEDURE — 36415 COLL VENOUS BLD VENIPUNCTURE: CPT

## 2023-01-18 PROCEDURE — 96376 TX/PRO/DX INJ SAME DRUG ADON: CPT

## 2023-01-18 PROCEDURE — 6360000002 HC RX W HCPCS: Performed by: PHYSICIAN ASSISTANT

## 2023-01-18 PROCEDURE — 81001 URINALYSIS AUTO W/SCOPE: CPT

## 2023-01-18 PROCEDURE — 87086 URINE CULTURE/COLONY COUNT: CPT

## 2023-01-18 PROCEDURE — 83605 ASSAY OF LACTIC ACID: CPT

## 2023-01-18 PROCEDURE — 80053 COMPREHEN METABOLIC PANEL: CPT

## 2023-01-18 RX ORDER — KETOROLAC TROMETHAMINE 30 MG/ML
15 INJECTION, SOLUTION INTRAMUSCULAR; INTRAVENOUS ONCE
Status: COMPLETED | OUTPATIENT
Start: 2023-01-18 | End: 2023-01-18

## 2023-01-18 RX ORDER — CEFUROXIME AXETIL 250 MG/1
500 TABLET ORAL ONCE
Status: COMPLETED | OUTPATIENT
Start: 2023-01-18 | End: 2023-01-18

## 2023-01-18 RX ORDER — METHOCARBAMOL 750 MG/1
1500 TABLET, FILM COATED ORAL ONCE
Status: COMPLETED | OUTPATIENT
Start: 2023-01-18 | End: 2023-01-18

## 2023-01-18 RX ORDER — 0.9 % SODIUM CHLORIDE 0.9 %
1000 INTRAVENOUS SOLUTION INTRAVENOUS ONCE
Status: COMPLETED | OUTPATIENT
Start: 2023-01-18 | End: 2023-01-18

## 2023-01-18 RX ORDER — CEFUROXIME AXETIL 250 MG/1
250 TABLET ORAL 2 TIMES DAILY
Qty: 14 TABLET | Refills: 0 | Status: SHIPPED | OUTPATIENT
Start: 2023-01-18 | End: 2023-01-25

## 2023-01-18 RX ADMIN — METHOCARBAMOL 1500 MG: 750 TABLET ORAL at 18:26

## 2023-01-18 RX ADMIN — CEFUROXIME AXETIL 500 MG: 250 TABLET ORAL at 18:26

## 2023-01-18 RX ADMIN — SODIUM CHLORIDE 1000 ML: 9 INJECTION, SOLUTION INTRAVENOUS at 15:05

## 2023-01-18 RX ADMIN — KETOROLAC TROMETHAMINE 15 MG: 30 INJECTION, SOLUTION INTRAMUSCULAR at 15:04

## 2023-01-18 RX ADMIN — KETOROLAC TROMETHAMINE 15 MG: 30 INJECTION, SOLUTION INTRAMUSCULAR at 18:26

## 2023-01-18 ASSESSMENT — PAIN SCALES - GENERAL
PAINLEVEL_OUTOF10: 3
PAINLEVEL_OUTOF10: 4
PAINLEVEL_OUTOF10: 4

## 2023-01-18 ASSESSMENT — PAIN DESCRIPTION - ORIENTATION: ORIENTATION: LEFT

## 2023-01-18 ASSESSMENT — PAIN DESCRIPTION - LOCATION
LOCATION: FLANK
LOCATION: FLANK

## 2023-01-18 ASSESSMENT — PAIN - FUNCTIONAL ASSESSMENT: PAIN_FUNCTIONAL_ASSESSMENT: 0-10

## 2023-01-18 ASSESSMENT — PAIN DESCRIPTION - PAIN TYPE: TYPE: ACUTE PAIN

## 2023-01-18 NOTE — ED PROVIDER NOTES
[address] [facility]  ED  EMERGENCY DEPARTMENT ENCOUNTER        Pt Name: Mecca Walter  MRN: [id number]  [place] 1980  Date of evaluation: 1/18/2023  Provider: SANTIAGO Lee  PCP: SANTIAGO Gao  Note Started: 2:29 PM EST 1/18/23      YASMANI. I have evaluated this patient. My supervising physician was available for consultation. CHIEF COMPLAINT       Chief Complaint   Patient presents with    Flank Pain     LEFT flank pain  hx of stones, and uti's . Pt told she has \"an abscess with stones in it\" 2 weeks pta. HISTORY OF PRESENT ILLNESS: 1 or more Elements     History from : Patient        Mecca Walter is a 38 y.o. female who presents complaining of left flank pain. The patient states she has history of kidney stones and urinary tract infections. She states she usually follows with the urology group however she recently moved back from [place] and has not been able to follow-up with them. She states for the past 2 weeks she has had left flank pain that radiates into the left side of her ribs. She was seen in August in the emergency department for left rib pain. She states the left rib pain did improve for the past several months however with the left-sided flank kidney pain she feels like the left-sided rib pain has returned. Nursing Notes were all reviewed and agreed with or any disagreements were addressed in the HPI. REVIEW OF SYSTEMS :      Review of Systems    Positives and Pertinent negatives as per HPI.      SURGICAL HISTORY     Past Surgical History:   Procedure Laterality Date    COLONOSCOPY  10/20/15    possible ischemic colitis    CYSTOSCOPY Left 08/13/2020    left uretroscopy, with stone extraction    CYSTOSCOPY Left 10/1/2021    CYSTOSCOPY LEFT  URETERAL STENT INSERTION performed by Mattie Huff MD at [address] [place] [address] / Monika Obrien / Elida Frank Left 8/13/2020    CYSTOSCOPY, URETEROSCOPY, HOLMIUM LASER LITHOTRIPSY, STONE EXTRACTION performed by Meron Hester MD at [facility]  10/11/2016    Hysteroscopy, Novasure Ablation    LITHOTRIPSY      x 7    NEPHROLITHOTOMY Left 9/17/2020    LEFT PERCUTANEOUS NEPHROLITHOTOMY, CYSTOSCOPY WITH HOLMIUM LASER LITHOTRIPSY performed by [facility] Flores MD at [facility]    NEPHROSTOMY Left 9/17/2020    NEPHROSTOMY PERCUTANEOUS TUBE INSERTION performed by Sunni Flores MD at [address] [place] [address]    TUBAL LIGATION  2001    [address]      X 2 then removed    [address]       Discharge Medication List as of 1/18/2023  6:44 PM        CONTINUE these medications which have NOT CHANGED    Details   !! ondansetron (ZOFRAN ODT) 4 MG disintegrating tablet Take 1 tablet by mouth every 8 hours as needed for Nausea, Disp-20 tablet, R-0Print      albuterol sulfate HFA (PROVENTIL;VENTOLIN;PROAIR) 108 (90 Base) MCG/ACT inhaler INHALE TWO PUFFS BY MOUTH EVERY 4 HOURS AS NEEDED FOR WHEEZING, Disp-18 g, R-0Normal      venlafaxine (EFFEXOR XR) 37.5 MG extended release capsule TAKE 1 CAPSULE BY MOUTH DAILY FOR ONE WEEK THEN INCREASE TO 2 CAPULES BY MOUTH DAILY, Disp-60 capsule, R-2Normal      busPIRone (BUSPAR) 30 MG tablet Take 30 mg by mouth 2 times dailyHistorical Med      nicotine (NICODERM CQ) 21 MG/24HR Place 1 patch onto the skin daily, Disp-30 patch, R-3Normal      metFORMIN (GLUCOPHAGE) 500 MG tablet Take 500 mg by mouth 2 times daily (with meals)Historical Med      Lisdexamfetamine Dimesylate (VYVANSE) 40 MG CAPS Take 40 mg by mouth daily. Historical Med      ibuprofen (IBU) 600 MG tablet Take 1 tablet by mouth every 6 hours as needed for Pain, Disp-30 tablet, R-0Normal      acetaminophen (APAP EXTRA STRENGTH) 500 MG tablet Take 2 tablets by mouth every 6 hours as needed for Pain, Disp-40 tablet, R-0Normal      !! ondansetron (ZOFRAN ODT) 4 MG disintegrating tablet Take 1 tablet by mouth every 8 hours as needed for Nausea Let dissolve in mouth., Disp-10 tablet, R-0Normal      omeprazole (PRILOSEC) 40 MG delayed release capsule Take 1 capsule by mouth every morning (before breakfast), Disp-90 capsule, R-1Normal      diphenhydrAMINE (BENADRYL) 50 MG capsule Take 50 mg by mouth nightly as needed for SleepHistorical Med      buprenorphine-naloxone (SUBOXONE) 8-2 MG SUBL SL tablet Place 1 tablet under the tongue daily. Pt takes 12 mg dailyHistorical Med      QUEtiapine (SEROQUEL XR) 150 MG TB24 extended release tablet Take 150 mg by mouth nightlyHistorical Med       !! - Potential duplicate medications found. Please discuss with provider.          ALLERGIES     Patient has no known allergies.    FAMILYHISTORY       Family History   Problem Relation Age of Onset    High Blood Pressure Father     Kidney Disease Father     Cancer Maternal Grandmother     Cancer Maternal Grandfather     Kidney Disease Maternal Grandfather     Cancer Paternal Grandmother     Cancer Paternal Grandfather     Cancer Other 3        ALL    Diabetes Maternal Uncle     COPD Mother     Depression Sister     Ovarian Cancer Sister         SOCIAL HISTORY       Social History     Tobacco Use    Smoking status: Every Day     Packs/day: 0.50     Years: 21.00     Pack years: 10.50     Types: Cigarettes     Start date: 4/15/1995    Smokeless tobacco: Never   Vaping Use    Vaping Use: Never used   Substance Use Topics    Alcohol use: Not Currently     Comment: quit 2 months ago.     Drug use: Yes     Types: Marijuana (Weed)     Comment: occas       SCREENINGS        Lulu Coma Scale  Eye Opening: Spontaneous  Best Verbal Response: Oriented  Best Motor Response: Obeys commands  Lulu Coma Scale Score: 15                CIWA Assessment  BP: 127/79  Heart Rate: 76           PHYSICAL EXAM  1 or more Elements     ED Triage Vitals [01/18/23 1427]   BP Temp Temp Source Heart Rate Resp SpO2 Height Weight   138/87 97.9 °F (36.6 °C) Oral (!) 121 18 100 %  -- --       Physical Exam  Vitals and nursing note reviewed.   Constitutional:       Appearance: Normal appearance. She is not diaphoretic.   HENT:      Head: Normocephalic and atraumatic.      Nose: Nose normal.      Mouth/Throat:      Mouth: Mucous membranes are moist.   Eyes:      General:         Right eye: No discharge.         Left eye: No discharge.      Extraocular Movements: Extraocular movements intact.      Pupils: Pupils are equal, round, and reactive to light.   Pulmonary:      Effort: Pulmonary effort is normal. No respiratory distress.   Abdominal:      General: Abdomen is flat.      Palpations: Abdomen is soft.      Tenderness: There is no abdominal tenderness. There is left CVA tenderness. There is no guarding or rebound.   Musculoskeletal:         General: Normal range of motion.      Cervical back: Normal range of motion and neck supple.   Skin:     General: Skin is warm and dry.      Coloration: Skin is not pale.   Neurological:      Mental Status: She is alert and oriented to person, place, and time.   Psychiatric:         Mood and Affect: Mood normal.         Behavior: Behavior normal.         DIAGNOSTIC RESULTS   LABS:    Labs Reviewed   CBC WITH AUTO DIFFERENTIAL - Abnormal; Notable for the following components:       Result Value    .4 (*)     MCH 34.4 (*)     All other components within normal limits   COMPREHENSIVE METABOLIC PANEL W/ REFLEX TO MG FOR LOW K - Abnormal; Notable for the following components:    Sodium 135 (*)     Glucose 111 (*)     All other components within normal limits   LIPASE - Abnormal; Notable for the following components:    Lipase 64.0 (*)     All other components within normal limits   URINALYSIS WITH REFLEX TO CULTURE - Abnormal; Notable for the following components:    Clarity, UA SL CLOUDY (*)     Blood, Urine LARGE (*)     Leukocyte Esterase, Urine SMALL (*)     All other components within normal limits   MICROSCOPIC URINALYSIS - Abnormal; Notable for  the following components:    Mucus, UA Rare (*)     WBC, UA 21-50 (*)     RBC, UA  (*)     Epithelial Cells, UA 6-10 (*)     Bacteria, UA 2+ (*)     Crystals, UA 1+ Ca. Oxalate (*)     All other components within normal limits   CULTURE, URINE   LACTIC ACID   HCG, SERUM, QUALITATIVE       When ordered only abnormal lab results are displayed. All other labs were within normal range or not returned as of this dictation. EKG: When ordered, EKG's are interpreted by the Emergency Department Physician in the absence of a cardiologist.  Please see their note for interpretation of EKG. RADIOLOGY:   Non-plain film images such as CT, Ultrasound and MRI are read by the radiologist. Plain radiographic images are visualized and preliminarily interpreted by the ED Provider with the below findings:    No acute intra-abdominal abnormality. She does have a periumbilical hernia. Stable bilateral nephrolithiasis    Interpretation per the Radiologist below, if available at the time of this note:    CT ABDOMEN PELVIS WO CONTRAST Additional Contrast? None   Final Result   Stable bilateral nephrolithiasis left greater than right. No evidence of   urinary tract obstruction. Periumbilical hernia containing omental fat. No inflammation or bowel   contents. PROCEDURES   Unless otherwise noted below, none     Procedures    CRITICAL CARE TIME (.cctime)       PAST MEDICAL HISTORY      has a past medical history of Acute ischemic colitis ([address] (10/20/15), Anesthesia complication, Asthma, Bipolar 1 disorder ([address] Bipolar affective disorder, current episode depressed ([address] (5/28/2015), Depression, Kidney stones, Migraine, Nodule of left lung, Nodule of right lung, Opiate addiction ([address] Stage 3 chronic kidney disease ([address] and Wears contact lenses.      Chronic Conditions affecting Care: Kidney stones, stage III kidney disease    EMERGENCY DEPARTMENT COURSE and DIFFERENTIAL DIAGNOSIS/MDM:   Vitals:    Vitals: 01/18/23 1427 01/18/23 1505 01/18/23 1658 01/18/23 1829   BP: 138/87 103/72 127/79    Pulse: (!) 121 69 76    Resp: 18 18 18    Temp: 97.9 °F (36.6 °C)      TempSrc: Oral      SpO2: 100% 100% 100% 100%       Patient was given the following medications:  Medications   ketorolac (TORADOL) injection 15 mg (15 mg IntraVENous Given 1/18/23 1504)   0.9 % sodium chloride bolus (0 mLs IntraVENous Stopped 1/18/23 1637)   methocarbamol (ROBAXIN) tablet 1,500 mg (1,500 mg Oral Given 1/18/23 1826)   ketorolac (TORADOL) injection 15 mg (15 mg IntraVENous Given 1/18/23 1826)   cefUROXime (CEFTIN) tablet 500 mg (500 mg Oral Given 1/18/23 1826)             Is this patient to be included in the SEP-1 Core Measure due to severe sepsis or septic shock? No   Exclusion criteria - the patient is NOT to be included for SEP-1 Core Measure due to: Infection is not suspected    CONSULTS: (Who and What was discussed)  None              CC/HPI Summary, DDx, ED Course, and Reassessment: Patient was evaluated in the emergency department today for left flank pain. Abdominal exam is benign. Differential diagnosis includes musculoskeletal pain, kidney stone, pyelonephritis. Work-up results include:  CBC without evidence of leukocytosis or acute anemia  CMP without acute electrolyte abnormality maintain renal function. Lipase is 64  Lactic acid is 1.3  Urinalysis with a large amount of blood. Micro with rare mucus, 21-50 white blood cells,  red blood cells, 6-10 epithelial cells, 2+ bacteria and calcium oxalate present. Negative pregnancy test  CT abdomen and pelvis shows stable bilateral nephrolithiasis left greater than right. No evidence of obstruction. She is noted to have periumbilical hernia containing omental fat. Otherwise no acute intra abdominal abnormalities. The patient initially received Toradol for symptoms with good relief. I did discussion was had with the patient regarding her lab and imaging results.   We will treat for urinary tract infection. She is given second dose of Toradol and muscle relaxer for suspected musculoskeletal pain. Disposition Considerations (include 1 Tests not done, Shared Decision Making, Pt Expectation of Test or Tx.): I did consider imaging of her chest because the pain radiates into her left side of her chest.  However I reviewed previous work-up for left-sided chest pain and suspect that the patient's flank pain has exacerbated her rib pain that she previously had. No new injuries. Given the calcium oxalate in her urine and kidney stones on CAT scan I do suspect she may be has passed a kidney stone. Given findings of the urine we will also treat for urinary tract infection which she states has previously helped her similar symptoms in the past.  Urine will also be cultured. The patient will follow up with the urology group for further evaluation and treatment. She is comfortable with discharge home at this time. We discussed symptomatic care at home. She will follow-up with urology and primary care physician for further evaluation and treatment. All questions are answered and she is discharged home in good condition.       Results for orders placed or performed during the hospital encounter of 01/18/23   CBC with Auto Differential   Result Value Ref Range    WBC 8.5 4.0 - 11.0 K/uL    RBC 4.19 4.00 - 5.20 M/uL    Hemoglobin 14.4 12.0 - 16.0 g/dL    Hematocrit 42.4 36.0 - 48.0 %    .4 (H) 80.0 - 100.0 fL    MCH 34.4 (H) 26.0 - 34.0 pg    MCHC 33.9 31.0 - 36.0 g/dL    RDW 13.4 12.4 - 15.4 %    Platelets 137 775 - 913 K/uL    MPV 8.5 5.0 - 10.5 fL    Neutrophils % 54.5 %    Lymphocytes % 36.7 %    Monocytes % 4.6 %    Eosinophils % 2.9 %    Basophils % 1.3 %    Neutrophils Absolute 4.7 1.7 - 7.7 K/uL    Lymphocytes Absolute 3.1 1.0 - 5.1 K/uL    Monocytes Absolute 0.4 0.0 - 1.3 K/uL    Eosinophils Absolute 0.2 0.0 - 0.6 K/uL    Basophils Absolute 0.1 0.0 - 0.2 K/uL   CMP w/ Reflex to MG   Result Value Ref Range    Sodium 135 (L) 136 - 145 mmol/L    Potassium reflex Magnesium 3.6 3.5 - 5.1 mmol/L    Chloride 99 99 - 110 mmol/L    CO2 26 21 - 32 mmol/L    Anion Gap 10 3 - 16    Glucose 111 (H) 70 - 99 mg/dL    BUN 16 7 - 20 mg/dL    Creatinine 1.0 0.6 - 1.1 mg/dL    Est, Glom Filt Rate >60 >60    Calcium 10.3 8.3 - 10.6 mg/dL    Total Protein 7.4 6.4 - 8.2 g/dL    Albumin 4.7 3.4 - 5.0 g/dL    Albumin/Globulin Ratio 1.7 1.1 - 2.2    Total Bilirubin 0.8 0.0 - 1.0 mg/dL    Alkaline Phosphatase 113 40 - 129 U/L    ALT 11 10 - 40 U/L    AST 16 15 - 37 U/L   Lipase   Result Value Ref Range    Lipase 64.0 (H) 13.0 - 60.0 U/L   Lactic Acid   Result Value Ref Range    Lactic Acid 1.3 0.4 - 2.0 mmol/L   Urinalysis with Reflex to Culture    Specimen: Urine   Result Value Ref Range    Color, UA Yellow Straw/Yellow    Clarity, UA SL CLOUDY (A) Clear    Glucose, Ur Negative Negative mg/dL    Bilirubin Urine Negative Negative    Ketones, Urine Negative Negative mg/dL    Specific Gravity, UA 1.015 1.005 - 1.030    Blood, Urine LARGE (A) Negative    pH, UA 7.0 5.0 - 8.0    Protein, UA Negative Negative mg/dL    Urobilinogen, Urine 0.2 <2.0 E.U./dL    Nitrite, Urine Negative Negative    Leukocyte Esterase, Urine SMALL (A) Negative    Microscopic Examination YES     Urine Type NotGiven     Urine Reflex to Culture Yes    Microscopic Urinalysis   Result Value Ref Range    Mucus, UA Rare (A) None Seen /LPF    WBC, UA 21-50 (A) 0 - 5 /HPF    RBC, UA  (A) 0 - 4 /HPF    Epithelial Cells, UA 6-10 (A) 0 - 5 /HPF    Bacteria, UA 2+ (A) None Seen /HPF    Crystals, UA 1+ Ca.  Oxalate (A) None Seen /HPF   HCG Qualitative, Serum   Result Value Ref Range    hCG Qual Negative Detects HCG level >10 MIU/mL       I estimate there is LOW risk for ACUTE APPENDICITIS, BOWEL OBSTRUCTION, CHOLECYSTITIS, DIVERTICULITIS, INCARCERATED HERNIA, PANCREATITIS, PELVIC INFLAMMATORY DISEASE, PERFORATED BOWEL or ULCER, PREGNANCY, or TUBO-OVARIAN ABSCESS, thus I consider the discharge disposition reasonable. Also, there is no evidence or peritonitis, sepsis, or toxicity. Teresa Cameron and I have discussed the diagnosis and risks, and we agree with discharging home to follow-up with their primary doctor. We also discussed returning to the Emergency Department immediately if new or worsening symptoms occur. We have discussed the symptoms which are most concerning (e.g., bloody stool, fever, changing or worsening pain, vomiting) that necessitate immediate return. Final Impression    1. Flank pain    2. Bilateral nephrolithiasis    3. Periumbilical hernia    4. Acute cystitis with hematuria        Blood pressure 127/79, pulse 76, temperature 97.9 °F (36.6 °C), temperature source Oral, resp. rate 18, SpO2 100 %, not currently breastfeeding. I am the Primary Clinician of Record. FINAL IMPRESSION      1. Flank pain    2. Bilateral nephrolithiasis    3. Periumbilical hernia    4.  Acute cystitis with hematuria          DISPOSITION/PLAN     DISPOSITION Decision To Discharge 01/18/2023 06:41:11 PM      PATIENT REFERRED TO:  [facility] [address] - [facility]  ED  [address]  Go to   If symptoms worsen    DISCHARGE MEDICATIONS:  Discharge Medication List as of 1/18/2023  6:44 PM        START taking these medications    Details   cefUROXime (CEFTIN) 250 MG tablet Take 1 tablet by mouth 2 times daily for 7 days, Disp-14 tablet, R-0Normal             DISCONTINUED MEDICATIONS:  Discharge Medication List as of 1/18/2023  6:44 PM                 (Please note that portions of this note were completed with a voice recognition program.  Efforts were made to edit the dictations but occasionally words are mis-transcribed.)    Sharene Gitelman, PA (electronically signed)           Sharene Gitelman, PA  01/18/23 1930

## 2023-01-19 LAB — URINE CULTURE, ROUTINE: NORMAL

## 2023-04-10 LAB — PAP SMEAR, EXTERNAL: NORMAL

## 2023-10-06 ENCOUNTER — HOSPITAL ENCOUNTER (EMERGENCY)
Age: 43
Discharge: HOME | End: 2023-10-06
Attending: EMERGENCY MEDICINE
Payer: COMMERCIAL

## 2023-10-06 VITALS
WEIGHT: 179.7 LBS | HEART RATE: 92 BPM | BODY MASS INDEX: 31.84 KG/M2 | SYSTOLIC BLOOD PRESSURE: 137 MMHG | DIASTOLIC BLOOD PRESSURE: 81 MMHG | TEMPERATURE: 98.6 F | RESPIRATION RATE: 10 BRPM | OXYGEN SATURATION: 98 % | HEIGHT: 63 IN

## 2023-10-06 DIAGNOSIS — L03.211 CELLULITIS, FACE: Primary | ICD-10-CM

## 2023-10-06 PROCEDURE — 6370000000 HC RX 637 (ALT 250 FOR IP): Performed by: EMERGENCY MEDICINE

## 2023-10-06 PROCEDURE — 69000 DRG XTRNL EAR ABSC/HEM SMPL: CPT

## 2023-10-06 PROCEDURE — 99283 EMERGENCY DEPT VISIT LOW MDM: CPT

## 2023-10-06 RX ORDER — SULFAMETHOXAZOLE AND TRIMETHOPRIM 800; 160 MG/1; MG/1
1 TABLET ORAL 2 TIMES DAILY
Qty: 14 TABLET | Refills: 0 | Status: SHIPPED | OUTPATIENT
Start: 2023-10-06 | End: 2023-10-06 | Stop reason: SDUPTHER

## 2023-10-06 RX ORDER — SULFAMETHOXAZOLE AND TRIMETHOPRIM 800; 160 MG/1; MG/1
1 TABLET ORAL 2 TIMES DAILY
Qty: 14 TABLET | Refills: 0 | Status: SHIPPED | OUTPATIENT
Start: 2023-10-06 | End: 2023-10-13

## 2023-10-06 RX ORDER — LISDEXAMFETAMINE DIMESYLATE 60 MG/1
60 CAPSULE ORAL DAILY
COMMUNITY
Start: 2023-09-30

## 2023-10-06 RX ORDER — QUETIAPINE FUMARATE 200 MG/1
200 TABLET, FILM COATED ORAL NIGHTLY
COMMUNITY
Start: 2023-09-30

## 2023-10-06 RX ORDER — CEPHALEXIN 500 MG/1
500 CAPSULE ORAL 3 TIMES DAILY
Qty: 21 CAPSULE | Refills: 0 | Status: SHIPPED | OUTPATIENT
Start: 2023-10-06 | End: 2023-10-13

## 2023-10-06 RX ORDER — CEPHALEXIN 500 MG/1
500 CAPSULE ORAL ONCE
Status: COMPLETED | OUTPATIENT
Start: 2023-10-06 | End: 2023-10-06

## 2023-10-06 RX ORDER — CEPHALEXIN 500 MG/1
500 CAPSULE ORAL 3 TIMES DAILY
Qty: 21 CAPSULE | Refills: 0 | Status: SHIPPED | OUTPATIENT
Start: 2023-10-06 | End: 2023-10-06 | Stop reason: SDUPTHER

## 2023-10-06 RX ORDER — SULFAMETHOXAZOLE AND TRIMETHOPRIM 800; 160 MG/1; MG/1
1 TABLET ORAL ONCE
Status: COMPLETED | OUTPATIENT
Start: 2023-10-06 | End: 2023-10-06

## 2023-10-06 RX ADMIN — SULFAMETHOXAZOLE AND TRIMETHOPRIM 1 TABLET: 800; 160 TABLET ORAL at 17:30

## 2023-10-06 RX ADMIN — CEPHALEXIN 500 MG: 500 CAPSULE ORAL at 17:31

## 2023-10-06 ASSESSMENT — PAIN DESCRIPTION - LOCATION: LOCATION: EAR

## 2023-10-06 ASSESSMENT — PAIN SCALES - GENERAL: PAINLEVEL_OUTOF10: 6

## 2023-10-06 NOTE — ED TRIAGE NOTES
Patient presents with right ear pain and swelling ear lobe that has aggravated x 3 days, states \" my ear also feels clogged and its ringing\"

## 2023-12-13 NOTE — CARE COORDINATION
IMPORTANT EVENTS THIS SHIFT:  Received bedside report from previous RN. Bed alarm set, call light within reach. Patient in isolation this morning waiting for stool sample, G/I R/O .Sample collected, results were negative, isolation discontinued.     Still trying to get urine sample, and catch in hat for patient.     Patient alert to self.    Patient glucose 58 this morning. Dr. Suh notified. After some OJ and AJ given to patient, recheck was 97. IVF changed from NS to D5NS. Requested for Hypoglycemia PRN orders to be placed.     Patient amlodipine Dc'd d/t hypotension.    Dr. Suh notified about need to activate SDM for patient and that daughter Vivian would like a phone call about US results.    IMPORTANT EVENTS COMING UP/GOALS (PLEASE INCLUDE WHITE BOARD AND DISCHARGE BOARD UPDATES):   PATIENT SPECIAL NEEDS/ACCOMMODATIONS:    Fall precautions, IVF.               You Patient resolved from the Care Transitions episode on 5/28/2020  Discussed COVID-19 related testing which was not done at this time. Test results were not done. Patient informed of results, if available? N/A    Patient/family has been provided the following resources and education related to COVID-19:                         Signs, symptoms and red flags related to COVID-19            [facility] exposure and quarantine guidelines            [employer] exposure contact - [phone]            Contact for their local Department of Health                 Patient currently reports that the following symptoms have improved:  Flank Pain. Patient says she has not had any pain recently. No further outreach scheduled with this CTN/ACM. Episode of Care resolved. Patient has this CTN/ACM contact information if future needs arise.

## 2024-09-03 ENCOUNTER — HOSPITAL ENCOUNTER (EMERGENCY)
Age: 44
Discharge: HOME | End: 2024-09-03
Attending: EMERGENCY MEDICINE
Payer: COMMERCIAL

## 2024-09-03 ENCOUNTER — APPOINTMENT (OUTPATIENT)
Dept: GENERAL RADIOLOGY | Age: 44
End: 2024-09-03
Payer: COMMERCIAL

## 2024-09-03 VITALS
HEART RATE: 83 BPM | OXYGEN SATURATION: 100 % | DIASTOLIC BLOOD PRESSURE: 83 MMHG | SYSTOLIC BLOOD PRESSURE: 111 MMHG | RESPIRATION RATE: 16 BRPM | BODY MASS INDEX: 32.34 KG/M2 | HEIGHT: 63 IN | TEMPERATURE: 98.1 F

## 2024-09-03 DIAGNOSIS — M54.9 UPPER BACK PAIN ON LEFT SIDE: Primary | ICD-10-CM

## 2024-09-03 LAB
ALBUMIN SERPL-MCNC: 4.2 G/DL (ref 3.4–5)
ALBUMIN/GLOB SERPL: 1.4 {RATIO} (ref 1.1–2.2)
ALP SERPL-CCNC: 107 U/L (ref 40–129)
ALT SERPL-CCNC: 10 U/L (ref 10–40)
ANION GAP SERPL CALCULATED.3IONS-SCNC: 15 MMOL/L (ref 3–16)
AST SERPL-CCNC: 16 U/L (ref 15–37)
BACTERIA URNS QL MICRO: ABNORMAL /HPF
BASOPHILS # BLD: 0 K/UL (ref 0–0.2)
BASOPHILS NFR BLD: 1 %
BILIRUB SERPL-MCNC: 0.5 MG/DL (ref 0–1)
BILIRUB UR QL STRIP.AUTO: NEGATIVE
BUN SERPL-MCNC: 15 MG/DL (ref 7–20)
CALCIUM SERPL-MCNC: 9.9 MG/DL (ref 8.3–10.6)
CHLORIDE SERPL-SCNC: 100 MMOL/L (ref 99–110)
CLARITY UR: CLEAR
CO2 SERPL-SCNC: 25 MMOL/L (ref 21–32)
COLOR UR: YELLOW
CREAT SERPL-MCNC: 0.9 MG/DL (ref 0.6–1.1)
D-DIMER QUANTITATIVE: 0.42 UG/ML FEU (ref 0–0.6)
DEPRECATED RDW RBC AUTO: 12.9 % (ref 12.4–15.4)
EOSINOPHIL # BLD: 0.2 K/UL (ref 0–0.6)
EOSINOPHIL NFR BLD: 4 %
EPI CELLS #/AREA URNS HPF: ABNORMAL /HPF (ref 0–5)
GFR SERPLBLD CREATININE-BSD FMLA CKD-EPI: 80 ML/MIN/{1.73_M2}
GLUCOSE SERPL-MCNC: 107 MG/DL (ref 70–99)
GLUCOSE UR STRIP.AUTO-MCNC: NEGATIVE MG/DL
HCT VFR BLD AUTO: 42.8 % (ref 36–48)
HGB BLD-MCNC: 14.1 G/DL (ref 12–16)
HGB UR QL STRIP.AUTO: NEGATIVE
KETONES UR STRIP.AUTO-MCNC: NEGATIVE MG/DL
LEUKOCYTE ESTERASE UR QL STRIP.AUTO: ABNORMAL
LYMPHOCYTES # BLD: 1.8 K/UL (ref 1–5.1)
LYMPHOCYTES NFR BLD: 38.7 %
MCH RBC QN AUTO: 31.9 PG (ref 26–34)
MCHC RBC AUTO-ENTMCNC: 32.9 G/DL (ref 31–36)
MCV RBC AUTO: 96.7 FL (ref 80–100)
MONOCYTES # BLD: 0.3 K/UL (ref 0–1.3)
MONOCYTES NFR BLD: 5.8 %
MUCOUS THREADS #/AREA URNS LPF: ABNORMAL /LPF
NEUTROPHILS # BLD: 2.3 K/UL (ref 1.7–7.7)
NEUTROPHILS NFR BLD: 50.5 %
NITRITE UR QL STRIP.AUTO: NEGATIVE
PH UR STRIP.AUTO: 6 [PH] (ref 5–8)
PLATELET # BLD AUTO: 223 K/UL (ref 135–450)
PMV BLD AUTO: 9.5 FL (ref 5–10.5)
POTASSIUM SERPL-SCNC: 4.4 MMOL/L (ref 3.5–5.1)
PROT SERPL-MCNC: 7.2 G/DL (ref 6.4–8.2)
PROT UR STRIP.AUTO-MCNC: NEGATIVE MG/DL
RBC # BLD AUTO: 4.43 M/UL (ref 4–5.2)
RBC #/AREA URNS HPF: ABNORMAL /HPF (ref 0–4)
SODIUM SERPL-SCNC: 140 MMOL/L (ref 136–145)
SP GR UR STRIP.AUTO: 1.02 (ref 1–1.03)
TROPONIN, HIGH SENSITIVITY: <6 NG/L (ref 0–14)
UA COMPLETE W REFLEX CULTURE PNL UR: ABNORMAL
UA DIPSTICK W REFLEX MICRO PNL UR: YES
URN SPEC COLLECT METH UR: ABNORMAL
UROBILINOGEN UR STRIP-ACNC: 0.2 E.U./DL
WBC # BLD AUTO: 4.5 K/UL (ref 4–11)
WBC #/AREA URNS HPF: ABNORMAL /HPF (ref 0–5)

## 2024-09-03 PROCEDURE — 87086 URINE CULTURE/COLONY COUNT: CPT

## 2024-09-03 PROCEDURE — 81001 URINALYSIS AUTO W/SCOPE: CPT

## 2024-09-03 PROCEDURE — 84484 ASSAY OF TROPONIN QUANT: CPT

## 2024-09-03 PROCEDURE — 85379 FIBRIN DEGRADATION QUANT: CPT

## 2024-09-03 PROCEDURE — 99284 EMERGENCY DEPT VISIT MOD MDM: CPT

## 2024-09-03 PROCEDURE — 36415 COLL VENOUS BLD VENIPUNCTURE: CPT

## 2024-09-03 PROCEDURE — 71045 X-RAY EXAM CHEST 1 VIEW: CPT

## 2024-09-03 PROCEDURE — 80053 COMPREHEN METABOLIC PANEL: CPT

## 2024-09-03 PROCEDURE — 85025 COMPLETE CBC W/AUTO DIFF WBC: CPT

## 2024-09-03 RX ORDER — ALBUTEROL SULFATE 90 UG/1
2 AEROSOL, METERED RESPIRATORY (INHALATION) 4 TIMES DAILY PRN
Qty: 18 G | Refills: 0 | Status: SHIPPED | OUTPATIENT
Start: 2024-09-03

## 2024-09-03 ASSESSMENT — LIFESTYLE VARIABLES
HOW MANY STANDARD DRINKS CONTAINING ALCOHOL DO YOU HAVE ON A TYPICAL DAY: PATIENT DOES NOT DRINK
HOW OFTEN DO YOU HAVE A DRINK CONTAINING ALCOHOL: NEVER

## 2024-09-03 ASSESSMENT — ENCOUNTER SYMPTOMS
VOICE CHANGE: 0
NAUSEA: 0
SHORTNESS OF BREATH: 0
TROUBLE SWALLOWING: 0
VOMITING: 0
BACK PAIN: 1
DIARRHEA: 0

## 2024-09-03 ASSESSMENT — PAIN DESCRIPTION - DESCRIPTORS: DESCRIPTORS: SHARP;PRESSURE

## 2024-09-03 ASSESSMENT — PAIN DESCRIPTION - PAIN TYPE: TYPE: ACUTE PAIN

## 2024-09-03 ASSESSMENT — PAIN DESCRIPTION - LOCATION: LOCATION: BACK

## 2024-09-03 ASSESSMENT — PAIN SCALES - GENERAL: PAINLEVEL_OUTOF10: 4

## 2024-09-03 ASSESSMENT — PAIN - FUNCTIONAL ASSESSMENT: PAIN_FUNCTIONAL_ASSESSMENT: 0-10

## 2024-09-03 NOTE — ED PROVIDER NOTES
[facility]     Interpersonal Safety    Received from [facility]     Housing/Utilities         PHYSICAL EXAM    (up to 7 for level 4, 8 or more for level 5)     ED Triage Vitals [09/03/24 1045]   BP Systolic BP Percentile Diastolic BP Percentile Temp Temp Source Pulse Respirations SpO2   136/81 -- -- 98.2 °F (36.8 °C) Oral 94 16 100 %      Height Weight         1.588 m (5' 2.5\") --             Physical Exam  Vitals and nursing note reviewed.   Constitutional:       Appearance: She is well-developed. She is not diaphoretic.   HENT:      Head: Normocephalic and atraumatic.      Right Ear: External ear normal.      Left Ear: External ear normal.   Eyes:      Conjunctiva/sclera: Conjunctivae normal.   Neck:      Vascular: No JVD.      Trachea: No tracheal deviation.   Cardiovascular:      Rate and Rhythm: Normal rate.   Pulmonary:      Effort: Pulmonary effort is normal. No respiratory distress.      Breath sounds: Normal breath sounds. No wheezing.   Abdominal:      General: There is no distension.      Palpations: Abdomen is soft.      Tenderness: There is no abdominal tenderness. There is no guarding or rebound.   Musculoskeletal:         General: Tenderness present. No deformity. Normal range of motion.      Cervical back: Neck supple.      Comments: No lower extremity swelling, tenderness, or palpable cord. Negative Liberty test bilaterally.    Left-sided paraspinal thoracic tenderness to palpation.  No midline tenderness.   Skin:     General: Skin is warm and dry.   Neurological:      General: No focal deficit present.      Mental Status: She is alert and oriented to person, place, and time.      Cranial Nerves: No cranial nerve deficit.      Comments: Patient awake alert and oriented.  Normal speech and mental status.  Normal gait on own power.  Normal strength and sensation.         DIAGNOSTIC RESULTS         RADIOLOGY:     Interpretation per the Radiologist below, if available at the time of this note:    XR CHEST

## 2024-09-04 ENCOUNTER — TELEPHONE (OUTPATIENT)
Dept: FAMILY MEDICINE CLINIC | Age: 44
End: 2024-09-04

## 2024-09-04 LAB — BACTERIA UR CULT: NORMAL

## 2024-09-04 NOTE — TELEPHONE ENCOUNTER
ED Follow Up Call/ Schedule appt   ED: Keiko Mckeon  Reason: Upper back pain  Date: 9/3/24    Appt scheduled:       Comments:     Left voicemail for patient to call the office back to schedule a ED follow up appointment.

## 2024-09-04 NOTE — TELEPHONE ENCOUNTER
Future Appointments   Date Time Provider Department Center   9/19/2024  2:30 PM Marlene Leung PA [facility] DEP

## 2024-09-19 ENCOUNTER — OFFICE VISIT (OUTPATIENT)
Dept: FAMILY MEDICINE CLINIC | Age: 44
End: 2024-09-19
Payer: COMMERCIAL

## 2024-09-19 VITALS
WEIGHT: 184 LBS | HEIGHT: 63 IN | HEART RATE: 115 BPM | DIASTOLIC BLOOD PRESSURE: 64 MMHG | TEMPERATURE: 97.5 F | OXYGEN SATURATION: 98 % | BODY MASS INDEX: 32.6 KG/M2 | SYSTOLIC BLOOD PRESSURE: 122 MMHG

## 2024-09-19 DIAGNOSIS — Z12.31 ENCOUNTER FOR SCREENING MAMMOGRAM FOR MALIGNANT NEOPLASM OF BREAST: ICD-10-CM

## 2024-09-19 DIAGNOSIS — R87.619 ABNORMAL CERVICAL PAPANICOLAOU SMEAR, UNSPECIFIED ABNORMAL PAP FINDING: ICD-10-CM

## 2024-09-19 DIAGNOSIS — R53.83 FATIGUE, UNSPECIFIED TYPE: ICD-10-CM

## 2024-09-19 DIAGNOSIS — E78.5 DYSLIPIDEMIA: ICD-10-CM

## 2024-09-19 DIAGNOSIS — R63.5 WEIGHT GAIN: Primary | ICD-10-CM

## 2024-09-19 DIAGNOSIS — E55.9 VITAMIN D DEFICIENCY: ICD-10-CM

## 2024-09-19 DIAGNOSIS — R22.0 MASS OF SUBMANDIBULAR REGION: ICD-10-CM

## 2024-09-19 DIAGNOSIS — Z12.11 COLON CANCER SCREENING: ICD-10-CM

## 2024-09-19 DIAGNOSIS — R73.01 IFG (IMPAIRED FASTING GLUCOSE): ICD-10-CM

## 2024-09-19 PROCEDURE — G8419 CALC BMI OUT NRM PARAM NOF/U: HCPCS | Performed by: PHYSICIAN ASSISTANT

## 2024-09-19 PROCEDURE — G8427 DOCREV CUR MEDS BY ELIG CLIN: HCPCS | Performed by: PHYSICIAN ASSISTANT

## 2024-09-19 PROCEDURE — 99214 OFFICE O/P EST MOD 30 MIN: CPT | Performed by: PHYSICIAN ASSISTANT

## 2024-09-19 PROCEDURE — 1036F TOBACCO NON-USER: CPT | Performed by: PHYSICIAN ASSISTANT

## 2024-09-19 SDOH — ECONOMIC STABILITY: FOOD INSECURITY: WITHIN THE PAST 12 MONTHS, THE FOOD YOU BOUGHT JUST DIDN'T LAST AND YOU DIDN'T HAVE MONEY TO GET MORE.: NEVER TRUE

## 2024-09-19 SDOH — ECONOMIC STABILITY: INCOME INSECURITY: HOW HARD IS IT FOR YOU TO PAY FOR THE VERY BASICS LIKE FOOD, HOUSING, MEDICAL CARE, AND HEATING?: NOT HARD AT ALL

## 2024-09-19 SDOH — ECONOMIC STABILITY: FOOD INSECURITY: WITHIN THE PAST 12 MONTHS, YOU WORRIED THAT YOUR FOOD WOULD RUN OUT BEFORE YOU GOT MONEY TO BUY MORE.: NEVER TRUE

## 2024-09-19 ASSESSMENT — PATIENT HEALTH QUESTIONNAIRE - PHQ9: DEPRESSION UNABLE TO ASSESS: PT REFUSES

## 2024-09-19 NOTE — PROGRESS NOTES
\"Have you been to the ER, urgent care clinic since your last visit?  Hospitalized since your last visit?\"    YES - When: approximately 2  weeks ago.  Where and Why: [facility] was short of breath and back pain running fever .    “Have you seen or consulted any other health care providers outside our system since your last visit?”    YES - When: approximately 2  weeks ago.  Where and Why: Psychiatry .    Have you had a mammogram?”   NO    Date of last Mammogram: 6/11/2012      “Have you had a pap smear?”    No     Date of last Cervical Cancer screen (HPV or PAP): 10/22/2020       “Have you had a colorectal cancer screening such as a colonoscopy/FIT/Cologuard?    YES - Type: colonoscopy through [facility]      No colonoscopy on file  No cologuard on file  Date of last FIT: 8/18/2022   No flexible sigmoidoscopy on file       Click Here for Release of Records Request

## 2024-09-19 NOTE — PROGRESS NOTES
2024  Tashia Hewitt (: 1980)  44 y.o.    ASSESSMENT and PLAN:  Tashia was seen today for annual exam and follow-up.    Diagnoses and all orders for this visit:    Weight gain  -     TSH with Reflex to FT4; Future  -     Cortisol AM, Total; Future    IFG (impaired fasting glucose)  -     Hemoglobin A1C; Future    Mass of submandibular region  -     CT SOFT TISSUE NECK W CONTRAST; Future  - unclear etiology, recommend imaging for additional evaluation. Given location, I do not feel US can adequately evaluate.     Vitamin D deficiency  -     Vitamin D 25 Hydroxy; Future    Dyslipidemia  -     Lipid Panel; Future    Fatigue, unspecified type  -     TSH with Reflex to FT4; Future  -     Vitamin B12 & Folate; Future  -     Iron and TIBC; Future    Abnormal cervical Papanicolaou smear, unspecified abnormal pap finding  -     Char[facility] Moncada [facility]    Encounter for screening mammogram for malignant neoplasm of breast  -     [facility] COURTNEY DIGITAL SCREEN BILATERAL; Future    Colon cancer screening  -     VITO - Coby Greene MD, Gastroenterology, [facility]      No follow-ups on file.    HPI  Here for physical, ed follow up with acute complaints.     Last visit with the office 2022  Last visit with me 2022    Recent ED visit for left sided back pain 9/3  CBC unremarkable   CMP elevated glucose 107, otherwise unremarkable  D dimer negative.   UA with mod leuks, urine culture neg       Also with multiple acute complaints        States that over the last couple of years hasn't felt well   Has had unexplained fevers will resolve with ibuprofen.   Some weight loss about  10 lbs in 2 months.     Increased stress.   Following with psychiatry- Aarti Lamas  Currently on vyvanse for adhd  Quetiapine for insomnia     Has lesion under right  jaw  Unsure if lymph node or salivary gland.   Enlarging and painful.   Has been there for a couple of weeks.     Review of Systems

## 2024-09-24 ENCOUNTER — HOSPITAL ENCOUNTER (OUTPATIENT)
Age: 44
Discharge: HOME | End: 2024-09-24
Payer: COMMERCIAL

## 2024-09-24 ENCOUNTER — HOSPITAL ENCOUNTER (OUTPATIENT)
Dept: CT IMAGING | Age: 44
Discharge: HOME | End: 2024-09-24
Payer: COMMERCIAL

## 2024-09-24 DIAGNOSIS — E55.9 VITAMIN D DEFICIENCY: ICD-10-CM

## 2024-09-24 DIAGNOSIS — E78.5 DYSLIPIDEMIA: ICD-10-CM

## 2024-09-24 DIAGNOSIS — R22.0 MASS OF SUBMANDIBULAR REGION: ICD-10-CM

## 2024-09-24 DIAGNOSIS — R73.01 IFG (IMPAIRED FASTING GLUCOSE): ICD-10-CM

## 2024-09-24 DIAGNOSIS — R53.83 FATIGUE, UNSPECIFIED TYPE: ICD-10-CM

## 2024-09-24 DIAGNOSIS — R63.5 WEIGHT GAIN: ICD-10-CM

## 2024-09-24 PROCEDURE — 84443 ASSAY THYROID STIM HORMONE: CPT

## 2024-09-24 PROCEDURE — 82607 VITAMIN B-12: CPT

## 2024-09-24 PROCEDURE — 83540 ASSAY OF IRON: CPT

## 2024-09-24 PROCEDURE — 82746 ASSAY OF FOLIC ACID SERUM: CPT

## 2024-09-24 PROCEDURE — 82306 VITAMIN D 25 HYDROXY: CPT

## 2024-09-24 PROCEDURE — 83550 IRON BINDING TEST: CPT

## 2024-09-24 PROCEDURE — 83036 HEMOGLOBIN GLYCOSYLATED A1C: CPT

## 2024-09-24 PROCEDURE — 36415 COLL VENOUS BLD VENIPUNCTURE: CPT

## 2024-09-24 PROCEDURE — 80061 LIPID PANEL: CPT

## 2024-09-24 RX ORDER — IOPAMIDOL 755 MG/ML
75 INJECTION, SOLUTION INTRAVASCULAR
Status: DISCONTINUED | OUTPATIENT
Start: 2024-09-24 | End: 2024-09-24

## 2024-09-25 LAB
25(OH)D3 SERPL-MCNC: 29.7 NG/ML
CHOLEST SERPL-MCNC: 230 MG/DL (ref 0–199)
EST. AVERAGE GLUCOSE BLD GHB EST-MCNC: 111.2 MG/DL
FOLATE SERPL-MCNC: 5.47 NG/ML (ref 4.78–24.2)
HBA1C MFR BLD: 5.5 %
HDLC SERPL-MCNC: 37 MG/DL (ref 40–60)
IRON SATN MFR SERPL: 31 % (ref 15–50)
IRON SERPL-MCNC: 84 UG/DL (ref 37–145)
LDLC SERPL CALC-MCNC: 142 MG/DL
TIBC SERPL-MCNC: 272 UG/DL (ref 260–445)
TRIGL SERPL-MCNC: 255 MG/DL (ref 0–150)
TSH SERPL DL<=0.005 MIU/L-ACNC: 1.44 UIU/ML (ref 0.27–4.2)
VIT B12 SERPL-MCNC: 331 PG/ML (ref 211–911)
VLDLC SERPL CALC-MCNC: 51 MG/DL

## 2024-09-27 ENCOUNTER — TELEPHONE (OUTPATIENT)
Dept: FAMILY MEDICINE CLINIC | Age: 44
End: 2024-09-27

## 2024-09-27 NOTE — TELEPHONE ENCOUNTER
PRADIP.     CT Soft Tissue of Neck denied    [facility] Number [phone]  Case Number [id number]    Denied due to not being medically necessary.

## 2024-09-30 NOTE — TELEPHONE ENCOUNTER
Patient wanted to inform provider that she reviewed the office notes from 9/19/24 and noticed the note says   \"Has lesion under right  jaw  Unsure if lymph node or salivary gland.   Enlarging and painful.   Has been there for a couple of weeks.\"    Patient states the lesion under jaw has been there for 2 years about and is no painful. She wasn't sure if this information would help get the imaging approved.

## 2024-09-30 NOTE — TELEPHONE ENCOUNTER
Spoke with physician, note incomplete at time of review and location questionable as to whether an US necessary.     Please fax note to [phone]  Include case number [id number] and patient name. Thank you

## 2024-09-30 NOTE — TELEPHONE ENCOUNTER
Spoke with patient and clarified  Agreed that this is acute on chronic with recent increase in size.   Tender with manipulation, waiting on insurance to process fax from earlier today. We will update her when approved.

## 2024-10-01 ENCOUNTER — HOSPITAL ENCOUNTER (OUTPATIENT)
Dept: CT IMAGING | Age: 44
Discharge: HOME | End: 2024-10-01
Payer: COMMERCIAL

## 2024-10-01 PROCEDURE — 70491 CT SOFT TISSUE NECK W/DYE: CPT

## 2024-10-01 PROCEDURE — 6360000004 HC RX CONTRAST MEDICATION: Performed by: PHYSICIAN ASSISTANT

## 2024-10-01 RX ORDER — IOPAMIDOL 755 MG/ML
75 INJECTION, SOLUTION INTRAVASCULAR
Status: COMPLETED | OUTPATIENT
Start: 2024-10-01 | End: 2024-10-01

## 2024-10-01 RX ADMIN — IOPAMIDOL 75 ML: 755 INJECTION, SOLUTION INTRAVENOUS at 15:35

## 2024-10-03 ENCOUNTER — TELEPHONE (OUTPATIENT)
Dept: FAMILY MEDICINE CLINIC | Age: 44
End: 2024-10-03

## 2024-10-03 DIAGNOSIS — K11.8 MASS OF RIGHT PAROTID GLAND: Primary | ICD-10-CM

## 2024-10-03 NOTE — TELEPHONE ENCOUNTER
Discussed results with patient.   Urgent referral placed for ENT for biopsy   Scheduling information given to patient.

## 2024-10-10 ENCOUNTER — OFFICE VISIT (OUTPATIENT)
Dept: ENT CLINIC | Age: 44
End: 2024-10-10

## 2024-10-10 VITALS
HEART RATE: 97 BPM | BODY MASS INDEX: 33.68 KG/M2 | HEIGHT: 62 IN | WEIGHT: 183 LBS | SYSTOLIC BLOOD PRESSURE: 131 MMHG | DIASTOLIC BLOOD PRESSURE: 75 MMHG

## 2024-10-10 DIAGNOSIS — K11.8 PAROTID MASS: Primary | ICD-10-CM

## 2024-10-10 RX ORDER — LIDOCAINE HYDROCHLORIDE AND EPINEPHRINE 10; 10 MG/ML; UG/ML
2.5 INJECTION, SOLUTION INFILTRATION; PERINEURAL ONCE
Status: COMPLETED | OUTPATIENT
Start: 2024-10-10 | End: 2024-10-10

## 2024-10-10 RX ADMIN — LIDOCAINE HYDROCHLORIDE AND EPINEPHRINE 2.5 ML: 10; 10 INJECTION, SOLUTION INFILTRATION; PERINEURAL at 12:53

## 2024-10-10 ASSESSMENT — ENCOUNTER SYMPTOMS
TROUBLE SWALLOWING: 0
SORE THROAT: 0
APNEA: 0
SHORTNESS OF BREATH: 0
EYE ITCHING: 0
COUGH: 0
FACIAL SWELLING: 0
VOICE CHANGE: 0
SINUS PRESSURE: 0

## 2024-10-10 NOTE — PROGRESS NOTES
[facility]  [address], [address]  [place] [address]  P: [phone]       Patient     Tashia Hewitt  1980    ChiefComplaint     Chief Complaint   Patient presents with    New Patient     Parotid mass; has had for 2 yrs but seems to be getting bigger.       History of Present Illness     Tashia is a 44 year old female here today for evaluation of right-sided neck mass.  Has been present for 2 years started as pea-sized and has now increased.  Intermittently swells.  Current smoker.    Past Medical History     Past Medical History:   Diagnosis Date    Acute ischemic colitis (HCC) 10/20/15    Anesthesia complication     wakes from anesthesia with migraine    Asthma     Had real bad as child and then grew out of it and  then got pregnant it started acting up again.    Bipolar 1 disorder (HCC)     Bipolar affective disorder, current episode depressed ([facility]) 5/28/2015    Depression     Kidney stones     has had multiple kidney stones     Migraine     Nodule of left lung     pt has non calcified nodules on both lungs    Nodule of right lung     Opiate addiction (HCC)     Past hx    Stage 3 chronic kidney disease (HCC)     Wears contact lenses        Past Surgical History     Past Surgical History:   Procedure Laterality Date    COLONOSCOPY  10/20/15    possible ischemic colitis    CYSTOSCOPY Left 08/13/2020    left uretroscopy, with stone extraction    CYSTOSCOPY Left 10/1/2021    CYSTOSCOPY LEFT  URETERAL STENT INSERTION performed by Swapnil Meneses MD at [facility] OR    CYSTOSCOPY INSERTION / REMOVAL STENT / STONE Left 8/13/2020    CYSTOSCOPY, URETEROSCOPY, HOLMIUM LASER LITHOTRIPSY, STONE EXTRACTION performed by Scotty Juarez MD at [facility] [place]    DILATION AND CURETTAGE OF UTERUS  10/11/2016    Hysteroscopy, Novasure Ablation    LITHOTRIPSY      x 7    NEPHROLITHOTOMY Left 9/17/2020    LEFT PERCUTANEOUS NEPHROLITHOTOMY, CYSTOSCOPY WITH HOLMIUM LASER LITHOTRIPSY performed by Mario BALTAZAR

## 2025-02-27 ENCOUNTER — OFFICE VISIT (OUTPATIENT)
Dept: ENT CLINIC | Age: 45
End: 2025-02-27
Payer: COMMERCIAL

## 2025-02-27 VITALS
HEART RATE: 87 BPM | HEIGHT: 62 IN | DIASTOLIC BLOOD PRESSURE: 78 MMHG | WEIGHT: 180 LBS | SYSTOLIC BLOOD PRESSURE: 114 MMHG | BODY MASS INDEX: 33.13 KG/M2

## 2025-02-27 DIAGNOSIS — D11.9 WARTHIN'S TUMOR: Primary | ICD-10-CM

## 2025-02-27 PROCEDURE — 4004F PT TOBACCO SCREEN RCVD TLK: CPT | Performed by: OTOLARYNGOLOGY

## 2025-02-27 PROCEDURE — G8427 DOCREV CUR MEDS BY ELIG CLIN: HCPCS | Performed by: OTOLARYNGOLOGY

## 2025-02-27 PROCEDURE — 99214 OFFICE O/P EST MOD 30 MIN: CPT | Performed by: OTOLARYNGOLOGY

## 2025-02-27 PROCEDURE — G8417 CALC BMI ABV UP PARAM F/U: HCPCS | Performed by: OTOLARYNGOLOGY

## 2025-02-27 ASSESSMENT — ENCOUNTER SYMPTOMS
SHORTNESS OF BREATH: 0
SINUS PRESSURE: 0
SORE THROAT: 0
TROUBLE SWALLOWING: 0
APNEA: 0
VOICE CHANGE: 0
EYE ITCHING: 0
FACIAL SWELLING: 0
COUGH: 0

## 2025-02-27 NOTE — PROGRESS NOTES
Content: Thought content normal.                 Assessment and Plan     1. Warthin's tumor  CT images reviewed with patient.  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  Plan for superficial proctectomy without facial nerve dissection as this is sitting on top of SCM.  Discussed surgical excision, postoperative course.  All questions answered wishes to proceed.      Chanel Koenig,   2/27/25      Portions of this note were dictated using Dragon. There may be linguistic errors secondary to the use of this program.

## 2025-02-28 ENCOUNTER — PREP FOR PROCEDURE (OUTPATIENT)
Dept: ENT CLINIC | Age: 45
End: 2025-02-28

## 2025-02-28 DIAGNOSIS — D11.9 WARTHIN TUMOR: ICD-10-CM

## 2025-02-28 NOTE — PROGRESS NOTES
Tashia HARE Hewitt    Age 44 y.o.    female    1980    MRN [id number]    3/10/2025  Arrival Time_____________  OR Time____________138 Min     Procedure(s):  RIGHT SUPERFICIAL PAROTIDECTOMY WITHOUT FACIAL NERVE DISSECTION                      General    Surgeon(s):  Chanel Koenig, DO       Phone [phone] (home) [phone] (work)    [facility]  Date  Info Source  Home  Cell         Work  _____________________________________________________________________  _____________________________________________________________________  _____________________________________________________________________  _____________________________________________________________________  _____________________________________________________________________    PCP _____________________________ Phone_________________     H&P  ________________  Bringing      Chart              Epic      DOS      Called________  EKG ________________   Bringing      Chart              Epic      DOS      Called________  LABS________________   Bringing     Chart              Epic      DOS      Called________  Cardiac Clearance ______ Bringing      Chart              Epic      DOS      Called________  Pulmonary Clearance____ Bringing      Chart              Epic      DOS      Called________    Cardiologist________________________ Phone___________________________  Pulmonologist_______________________Phone___________________________    ? Advance Directives   ? Shinto concerns / Waiver on Chart            PAT [employer]________________  ? Pre-op Instructions Given /Understood          _________________________________  ? Directions to Surgery Center                          _________________________________  ? Transportation Home_______________      __________________________________  ? Crutches/Walker__________________        __________________________________    Orders: Hard copy/ EPIC                 Transcribed/ EPIC

## 2025-03-03 NOTE — PROGRESS NOTES
Date and time of surgery : 3/10/25@0850             Arrival Time:  0650     Bring Picture ID and insurance card.  Please wear simple, loose fitting clothing to the hospital.   Do not bring valuables (money, credit cards, checkbooks, etc.)   Do not wear any makeup (including  eye makeup) and no nail polish or artificial nails on your fingers or toes.  DO NOT wear any jewelry or piercings on day of surgery.  All body piercing jewelry must be removed.  If you have dentures, they will be removed before going to the OR; we will provide you a container.  If you wear contact lenses or glasses, they will be removed; please bring a case for them.  Shower the evening before or morning of surgery with antibacterial soap.  Nothing to eat or drink after midnight the day before surgery.   You may brush your teeth and gargle the morning of surgery.  DO NOT SWALLOW WATER.   Do not take any morning meds the day of your surgery.  Aspirin, Ibuprofen, Advil, Naproxen, Vitamin E and other Anti-inflammatory products and supplements should be stopped for 5 -7days before surgery or as directed by your physician.  Do not smoke or drink any alcoholic beverages 24 hours prior to surgery.  This includes NA Beer. Refrain from the usage of any recreational drugs, including non-prescribed prescription drugs.   You MUST plan for a responsible adult to stay on site while you are here and take you home after your surgery. You will not be allowed to leave alone or drive yourself home. It is strongly suggested someone stay with you the first 24 hrs. Your surgery will be cancelled if you do not have a ride home.  Notify your Surgeon if you develop any illness between now and time of surgery. Cough, cold, fever, sore throat, nausea, vomiting, etc.  Please notify your surgeon if you experience dizziness, shortness of breath or blurred vision between now & the time of your surgery  To provide excellent care visitors will be limited to two per room at

## 2025-03-06 ENCOUNTER — OFFICE VISIT (OUTPATIENT)
Dept: FAMILY MEDICINE CLINIC | Age: 45
End: 2025-03-06
Payer: COMMERCIAL

## 2025-03-06 VITALS
BODY MASS INDEX: 31.04 KG/M2 | DIASTOLIC BLOOD PRESSURE: 78 MMHG | HEART RATE: 76 BPM | WEIGHT: 167 LBS | SYSTOLIC BLOOD PRESSURE: 118 MMHG | OXYGEN SATURATION: 98 %

## 2025-03-06 DIAGNOSIS — D11.9 WARTHIN TUMOR: Primary | ICD-10-CM

## 2025-03-06 DIAGNOSIS — D11.9 WARTHIN TUMOR: ICD-10-CM

## 2025-03-06 DIAGNOSIS — Z01.818 PRE-OP EVALUATION: ICD-10-CM

## 2025-03-06 DIAGNOSIS — L81.1 MELASMA: ICD-10-CM

## 2025-03-06 PROCEDURE — 99214 OFFICE O/P EST MOD 30 MIN: CPT | Performed by: PHYSICIAN ASSISTANT

## 2025-03-06 PROCEDURE — 1036F TOBACCO NON-USER: CPT | Performed by: PHYSICIAN ASSISTANT

## 2025-03-06 PROCEDURE — G8417 CALC BMI ABV UP PARAM F/U: HCPCS | Performed by: PHYSICIAN ASSISTANT

## 2025-03-06 PROCEDURE — G8427 DOCREV CUR MEDS BY ELIG CLIN: HCPCS | Performed by: PHYSICIAN ASSISTANT

## 2025-03-06 RX ORDER — ESCITALOPRAM OXALATE 20 MG/1
20 TABLET ORAL DAILY
COMMUNITY
Start: 2025-02-27

## 2025-03-06 SDOH — ECONOMIC STABILITY: FOOD INSECURITY: WITHIN THE PAST 12 MONTHS, YOU WORRIED THAT YOUR FOOD WOULD RUN OUT BEFORE YOU GOT MONEY TO BUY MORE.: NEVER TRUE

## 2025-03-06 SDOH — ECONOMIC STABILITY: FOOD INSECURITY: WITHIN THE PAST 12 MONTHS, THE FOOD YOU BOUGHT JUST DIDN'T LAST AND YOU DIDN'T HAVE MONEY TO GET MORE.: NEVER TRUE

## 2025-03-06 NOTE — PROGRESS NOTES
medications on morning of surgery  3. Prophylaxis for cardiac events with perioperative beta-blockers: Not indicated  ACC/AHA indications for pre-operative beta-blocker use:    Vascular surgery with history of postitive stress test  Intermediate or high risk surgery with history of CAD   Intermediate or high risk surgery with multiple clinical predictors of CAD- 2 of the following: history of compensated or prior heart failure, history of cerebrovascular disease, DM, or renal insufficiency    Routine administration of higher-dose, long-acting metoprolol in beta-blocker-naïve patients on the day of surgery, and in the absence of dose titration is associated with an overall increase in mortality.  Beta-blockers should be started days to weeks prior to surgery and titrated to pulse < 70.  4. Deep vein thrombosis prophylaxis: regimen to be chosen by surgical team  5. No contraindications to planned surgery

## 2025-03-06 NOTE — H&P (VIEW-ONLY)
Preoperative Consultation      Tashia Hewitt  YOB: 1980    Date of Service:  3/6/2025    Vitals:    03/06/25 1151   BP: 118/78   Site: Right Upper Arm   Position: Sitting   Cuff Size: Medium Adult   Pulse: 76   SpO2: 98%   Weight: 75.8 kg (167 lb)      Wt Readings from Last 2 Encounters:   03/06/25 75.8 kg (167 lb)   02/27/25 81.6 kg (180 lb)     BP Readings from Last 3 Encounters:   03/06/25 118/78   02/27/25 114/78   10/10/24 131/75        Chief Complaint   Patient presents with    Pre-op Exam     3/10 Dr. Koenig ENT for RIGHT SUPERFICIAL PAROTIDECTOMY WITHOUT FACIAL NERVE DISSECTION with general anesthesia     No Known Allergies  Outpatient Medications Marked as Taking for the 3/6/25 encounter (Office Visit) with Marlene Leung PA   Medication Sig Dispense Refill    escitalopram (LEXAPRO) 20 MG tablet Take 1 tablet by mouth daily      albuterol sulfate HFA (VENTOLIN HFA) 108 (90 Base) MCG/ACT inhaler Inhale 2 puffs into the lungs 4 times daily as needed for Wheezing 18 g 0    QUEtiapine (SEROQUEL) 200 MG tablet Take 2 tablets by mouth at bedtime      VYVANSE 60 MG CAPS Take 60 mg by mouth daily.      albuterol sulfate HFA (PROVENTIL;VENTOLIN;PROAIR) 108 (90 Base) MCG/ACT inhaler INHALE TWO PUFFS BY MOUTH EVERY 4 HOURS AS NEEDED FOR WHEEZING 18 g 0    metFORMIN (GLUCOPHAGE) 500 MG tablet Take 1 tablet by mouth 2 times daily (with meals)         This patient presents to the office today for a preoperative consultation at the request of surgeon, Dr. Koenig, who plans on performing right superficial parotidectomy without facial nerve dissection on March 10 at [facility].  The current problem began 3 years ago, and symptoms have been worsening with time.  Conservative therapy: N/A.    Planned anesthesia: General   Known anesthesia problems: wakes up with a migraine   Bleeding risk: history of ITP at age 16, has had surgery since without any bleeding complication.   Personal

## 2025-03-07 ENCOUNTER — ANESTHESIA EVENT (OUTPATIENT)
Dept: OPERATING ROOM | Age: 45
End: 2025-03-07
Payer: COMMERCIAL

## 2025-03-07 ENCOUNTER — TELEPHONE (OUTPATIENT)
Dept: ENT CLINIC | Age: 45
End: 2025-03-07

## 2025-03-07 LAB
ANION GAP SERPL CALCULATED.3IONS-SCNC: 12 MMOL/L (ref 3–16)
BUN SERPL-MCNC: 18 MG/DL (ref 7–20)
CALCIUM SERPL-MCNC: 10.4 MG/DL (ref 8.3–10.6)
CHLORIDE SERPL-SCNC: 103 MMOL/L (ref 99–110)
CO2 SERPL-SCNC: 26 MMOL/L (ref 21–32)
CREAT SERPL-MCNC: 1.1 MG/DL (ref 0.6–1.1)
DEPRECATED RDW RBC AUTO: 14 % (ref 12.4–15.4)
GFR SERPLBLD CREATININE-BSD FMLA CKD-EPI: 62 ML/MIN/{1.73_M2}
GLUCOSE SERPL-MCNC: 108 MG/DL (ref 70–99)
HCT VFR BLD AUTO: 41.6 % (ref 36–48)
HGB BLD-MCNC: 13.7 G/DL (ref 12–16)
MCH RBC QN AUTO: 32.3 PG (ref 26–34)
MCHC RBC AUTO-ENTMCNC: 32.9 G/DL (ref 31–36)
MCV RBC AUTO: 98.4 FL (ref 80–100)
PLATELET # BLD AUTO: 208 K/UL (ref 135–450)
PMV BLD AUTO: 11.2 FL (ref 5–10.5)
POTASSIUM SERPL-SCNC: 4.5 MMOL/L (ref 3.5–5.1)
RBC # BLD AUTO: 4.23 M/UL (ref 4–5.2)
SODIUM SERPL-SCNC: 141 MMOL/L (ref 136–145)
WBC # BLD AUTO: 5.6 K/UL (ref 4–11)

## 2025-03-07 RX ORDER — SODIUM CHLORIDE 0.9 % (FLUSH) 0.9 %
5-40 SYRINGE (ML) INJECTION EVERY 12 HOURS SCHEDULED
Status: CANCELLED | OUTPATIENT
Start: 2025-03-07

## 2025-03-07 RX ORDER — SODIUM CHLORIDE 0.9 % (FLUSH) 0.9 %
5-40 SYRINGE (ML) INJECTION PRN
Status: CANCELLED | OUTPATIENT
Start: 2025-03-07

## 2025-03-07 RX ORDER — SODIUM CHLORIDE 9 MG/ML
INJECTION, SOLUTION INTRAVENOUS PRN
Status: CANCELLED | OUTPATIENT
Start: 2025-03-07

## 2025-03-10 ENCOUNTER — HOSPITAL ENCOUNTER (OUTPATIENT)
Age: 45
Setting detail: OUTPATIENT SURGERY
Discharge: HOME | End: 2025-03-10
Attending: OTOLARYNGOLOGY | Admitting: OTOLARYNGOLOGY
Payer: COMMERCIAL

## 2025-03-10 ENCOUNTER — ANESTHESIA (OUTPATIENT)
Dept: OPERATING ROOM | Age: 45
End: 2025-03-10
Payer: COMMERCIAL

## 2025-03-10 VITALS
HEART RATE: 66 BPM | WEIGHT: 165 LBS | SYSTOLIC BLOOD PRESSURE: 100 MMHG | BODY MASS INDEX: 31.15 KG/M2 | RESPIRATION RATE: 12 BRPM | OXYGEN SATURATION: 97 % | HEIGHT: 61 IN | DIASTOLIC BLOOD PRESSURE: 68 MMHG | TEMPERATURE: 97.5 F

## 2025-03-10 DIAGNOSIS — G89.18 POST-OP PAIN: Primary | ICD-10-CM

## 2025-03-10 DIAGNOSIS — D11.9 WARTHIN TUMOR: ICD-10-CM

## 2025-03-10 PROCEDURE — 6360000002 HC RX W HCPCS: Performed by: OTOLARYNGOLOGY

## 2025-03-10 PROCEDURE — 7100000011 HC PHASE II RECOVERY - ADDTL 15 MIN: Performed by: OTOLARYNGOLOGY

## 2025-03-10 PROCEDURE — 7100000010 HC PHASE II RECOVERY - FIRST 15 MIN: Performed by: OTOLARYNGOLOGY

## 2025-03-10 PROCEDURE — 3600000004 HC SURGERY LEVEL 4 BASE: Performed by: OTOLARYNGOLOGY

## 2025-03-10 PROCEDURE — 42410 EXCISE PAROTID GLAND/LESION: CPT | Performed by: OTOLARYNGOLOGY

## 2025-03-10 PROCEDURE — 2580000003 HC RX 258: Performed by: OTOLARYNGOLOGY

## 2025-03-10 PROCEDURE — 7100000000 HC PACU RECOVERY - FIRST 15 MIN: Performed by: OTOLARYNGOLOGY

## 2025-03-10 PROCEDURE — 2709999900 HC NON-CHARGEABLE SUPPLY: Performed by: OTOLARYNGOLOGY

## 2025-03-10 PROCEDURE — 88307 TISSUE EXAM BY PATHOLOGIST: CPT

## 2025-03-10 PROCEDURE — 6370000000 HC RX 637 (ALT 250 FOR IP): Performed by: ANESTHESIOLOGY

## 2025-03-10 PROCEDURE — 6360000002 HC RX W HCPCS

## 2025-03-10 PROCEDURE — 3600000014 HC SURGERY LEVEL 4 ADDTL 15MIN: Performed by: OTOLARYNGOLOGY

## 2025-03-10 PROCEDURE — 2500000003 HC RX 250 WO HCPCS: Performed by: OTOLARYNGOLOGY

## 2025-03-10 PROCEDURE — 7100000001 HC PACU RECOVERY - ADDTL 15 MIN: Performed by: OTOLARYNGOLOGY

## 2025-03-10 PROCEDURE — 3700000001 HC ADD 15 MINUTES (ANESTHESIA): Performed by: OTOLARYNGOLOGY

## 2025-03-10 PROCEDURE — 2500000003 HC RX 250 WO HCPCS

## 2025-03-10 PROCEDURE — 2720000010 HC SURG SUPPLY STERILE: Performed by: OTOLARYNGOLOGY

## 2025-03-10 PROCEDURE — 3700000000 HC ANESTHESIA ATTENDED CARE: Performed by: OTOLARYNGOLOGY

## 2025-03-10 RX ORDER — SODIUM CHLORIDE 9 MG/ML
INJECTION, SOLUTION INTRAVENOUS PRN
Status: DISCONTINUED | OUTPATIENT
Start: 2025-03-10 | End: 2025-03-10 | Stop reason: HOSPADM

## 2025-03-10 RX ORDER — PHENYLEPHRINE HCL IN 0.9% NACL 1 MG/10 ML
SYRINGE (ML) INTRAVENOUS
Status: DISCONTINUED | OUTPATIENT
Start: 2025-03-10 | End: 2025-03-10 | Stop reason: SDUPTHER

## 2025-03-10 RX ORDER — MIDAZOLAM HYDROCHLORIDE 1 MG/ML
2 INJECTION, SOLUTION INTRAMUSCULAR; INTRAVENOUS
Status: DISCONTINUED | OUTPATIENT
Start: 2025-03-10 | End: 2025-03-10 | Stop reason: HOSPADM

## 2025-03-10 RX ORDER — MEPERIDINE HYDROCHLORIDE 50 MG/ML
12.5 INJECTION INTRAMUSCULAR; INTRAVENOUS; SUBCUTANEOUS EVERY 5 MIN PRN
Status: DISCONTINUED | OUTPATIENT
Start: 2025-03-10 | End: 2025-03-10 | Stop reason: HOSPADM

## 2025-03-10 RX ORDER — NALOXONE HYDROCHLORIDE 0.4 MG/ML
INJECTION, SOLUTION INTRAMUSCULAR; INTRAVENOUS; SUBCUTANEOUS PRN
Status: DISCONTINUED | OUTPATIENT
Start: 2025-03-10 | End: 2025-03-10 | Stop reason: HOSPADM

## 2025-03-10 RX ORDER — MAGNESIUM HYDROXIDE 1200 MG/15ML
LIQUID ORAL CONTINUOUS PRN
Status: COMPLETED | OUTPATIENT
Start: 2025-03-10 | End: 2025-03-10

## 2025-03-10 RX ORDER — ONDANSETRON 2 MG/ML
INJECTION INTRAMUSCULAR; INTRAVENOUS
Status: DISCONTINUED | OUTPATIENT
Start: 2025-03-10 | End: 2025-03-10 | Stop reason: SDUPTHER

## 2025-03-10 RX ORDER — SODIUM CHLORIDE 0.9 % (FLUSH) 0.9 %
5-40 SYRINGE (ML) INJECTION EVERY 12 HOURS SCHEDULED
Status: DISCONTINUED | OUTPATIENT
Start: 2025-03-10 | End: 2025-03-10 | Stop reason: HOSPADM

## 2025-03-10 RX ORDER — LIDOCAINE HYDROCHLORIDE 20 MG/ML
INJECTION, SOLUTION INFILTRATION; PERINEURAL
Status: DISCONTINUED | OUTPATIENT
Start: 2025-03-10 | End: 2025-03-10 | Stop reason: SDUPTHER

## 2025-03-10 RX ORDER — OXYCODONE HYDROCHLORIDE 5 MG/1
5 TABLET ORAL PRN
Status: COMPLETED | OUTPATIENT
Start: 2025-03-10 | End: 2025-03-10

## 2025-03-10 RX ORDER — ONDANSETRON 2 MG/ML
4 INJECTION INTRAMUSCULAR; INTRAVENOUS
Status: DISCONTINUED | OUTPATIENT
Start: 2025-03-10 | End: 2025-03-10 | Stop reason: HOSPADM

## 2025-03-10 RX ORDER — OXYCODONE HYDROCHLORIDE 5 MG/1
10 TABLET ORAL PRN
Status: COMPLETED | OUTPATIENT
Start: 2025-03-10 | End: 2025-03-10

## 2025-03-10 RX ORDER — PROPOFOL 10 MG/ML
INJECTION, EMULSION INTRAVENOUS
Status: DISCONTINUED | OUTPATIENT
Start: 2025-03-10 | End: 2025-03-10 | Stop reason: SDUPTHER

## 2025-03-10 RX ORDER — SODIUM CHLORIDE 0.9 % (FLUSH) 0.9 %
5-40 SYRINGE (ML) INJECTION PRN
Status: DISCONTINUED | OUTPATIENT
Start: 2025-03-10 | End: 2025-03-10 | Stop reason: HOSPADM

## 2025-03-10 RX ORDER — MIDAZOLAM HYDROCHLORIDE 1 MG/ML
INJECTION, SOLUTION INTRAMUSCULAR; INTRAVENOUS
Status: DISCONTINUED | OUTPATIENT
Start: 2025-03-10 | End: 2025-03-10 | Stop reason: SDUPTHER

## 2025-03-10 RX ORDER — LIDOCAINE HYDROCHLORIDE 10 MG/ML
1 INJECTION, SOLUTION EPIDURAL; INFILTRATION; INTRACAUDAL; PERINEURAL
Status: DISCONTINUED | OUTPATIENT
Start: 2025-03-10 | End: 2025-03-10 | Stop reason: HOSPADM

## 2025-03-10 RX ORDER — HYDROCODONE BITARTRATE AND ACETAMINOPHEN 5; 325 MG/1; MG/1
1 TABLET ORAL EVERY 4 HOURS PRN
Qty: 18 TABLET | Refills: 0 | Status: SHIPPED | OUTPATIENT
Start: 2025-03-10 | End: 2025-03-13

## 2025-03-10 RX ORDER — DEXAMETHASONE SODIUM PHOSPHATE 10 MG/ML
INJECTION, SOLUTION INTRA-ARTICULAR; INTRALESIONAL; INTRAMUSCULAR; INTRAVENOUS; SOFT TISSUE
Status: DISCONTINUED | OUTPATIENT
Start: 2025-03-10 | End: 2025-03-10 | Stop reason: SDUPTHER

## 2025-03-10 RX ORDER — LIDOCAINE HYDROCHLORIDE AND EPINEPHRINE 10; 10 MG/ML; UG/ML
INJECTION, SOLUTION INFILTRATION; PERINEURAL PRN
Status: DISCONTINUED | OUTPATIENT
Start: 2025-03-10 | End: 2025-03-10 | Stop reason: ALTCHOICE

## 2025-03-10 RX ORDER — SODIUM CHLORIDE, SODIUM LACTATE, POTASSIUM CHLORIDE, CALCIUM CHLORIDE 600; 310; 30; 20 MG/100ML; MG/100ML; MG/100ML; MG/100ML
INJECTION, SOLUTION INTRAVENOUS CONTINUOUS
Status: DISCONTINUED | OUTPATIENT
Start: 2025-03-10 | End: 2025-03-10 | Stop reason: HOSPADM

## 2025-03-10 RX ORDER — FENTANYL CITRATE 50 UG/ML
INJECTION, SOLUTION INTRAMUSCULAR; INTRAVENOUS
Status: DISCONTINUED | OUTPATIENT
Start: 2025-03-10 | End: 2025-03-10 | Stop reason: SDUPTHER

## 2025-03-10 RX ADMIN — PROPOFOL 80 MG: 10 INJECTION, EMULSION INTRAVENOUS at 09:19

## 2025-03-10 RX ADMIN — LIDOCAINE HYDROCHLORIDE 60 MG: 20 INJECTION, SOLUTION INFILTRATION; PERINEURAL at 08:51

## 2025-03-10 RX ADMIN — PROPOFOL 50 MG: 10 INJECTION, EMULSION INTRAVENOUS at 08:55

## 2025-03-10 RX ADMIN — Medication 100 MCG: at 09:23

## 2025-03-10 RX ADMIN — OXYCODONE HYDROCHLORIDE 5 MG: 5 TABLET ORAL at 10:09

## 2025-03-10 RX ADMIN — DEXAMETHASONE SODIUM PHOSPHATE 10 MG: 10 INJECTION INTRAMUSCULAR; INTRAVENOUS at 08:56

## 2025-03-10 RX ADMIN — Medication 100 MCG: at 09:26

## 2025-03-10 RX ADMIN — SODIUM CHLORIDE: 0.9 INJECTION, SOLUTION INTRAVENOUS at 07:16

## 2025-03-10 RX ADMIN — PROPOFOL 150 MG: 10 INJECTION, EMULSION INTRAVENOUS at 08:51

## 2025-03-10 RX ADMIN — MIDAZOLAM 2 MG: 1 INJECTION INTRAMUSCULAR; INTRAVENOUS at 08:47

## 2025-03-10 RX ADMIN — FENTANYL CITRATE 50 MCG: 50 INJECTION, SOLUTION INTRAMUSCULAR; INTRAVENOUS at 09:19

## 2025-03-10 RX ADMIN — Medication 100 MCG: at 09:14

## 2025-03-10 RX ADMIN — Medication 30 MG: at 08:51

## 2025-03-10 RX ADMIN — FENTANYL CITRATE 50 MCG: 50 INJECTION, SOLUTION INTRAMUSCULAR; INTRAVENOUS at 08:51

## 2025-03-10 RX ADMIN — ONDANSETRON 4 MG: 2 INJECTION INTRAMUSCULAR; INTRAVENOUS at 08:56

## 2025-03-10 RX ADMIN — Medication 100 MCG: at 09:11

## 2025-03-10 ASSESSMENT — ENCOUNTER SYMPTOMS: SHORTNESS OF BREATH: 0

## 2025-03-10 ASSESSMENT — PAIN - FUNCTIONAL ASSESSMENT: PAIN_FUNCTIONAL_ASSESSMENT: 0-10

## 2025-03-10 ASSESSMENT — LIFESTYLE VARIABLES: SMOKING_STATUS: 0

## 2025-03-10 ASSESSMENT — PAIN SCALES - GENERAL
PAINLEVEL_OUTOF10: 4
PAINLEVEL_OUTOF10: 0

## 2025-03-10 NOTE — ANESTHESIA PRE PROCEDURE
4/15/1995     Quit date: 2024     Years since quittin.6   • Smokeless tobacco: Never   Substance Use Topics   • Alcohol use: Not Currently     Comment: quit 2 months ago.                                 Counseling given: Not Answered      Vital Signs (Current):   Vitals:    25 1516 03/10/25 0706   BP:  112/63   Pulse:  70   Resp:  16   Temp:  98.1 °F (36.7 °C)   TempSrc:  Oral   SpO2:  100%   Weight: 74.8 kg (165 lb) 74.8 kg (165 lb)   Height: 1.562 m (5' 1.5\") 1.562 m (5' 1.5\")                                              BP Readings from Last 3 Encounters:   03/10/25 112/63   25 118/78   25 114/78       NPO Status: Time of last liquid consumption:                         Time of last solid consumption:                         Date of last liquid consumption: 25                        Date of last solid food consumption: 25    BMI:   Wt Readings from Last 3 Encounters:   03/10/25 74.8 kg (165 lb)   25 75.8 kg (167 lb)   25 81.6 kg (180 lb)     Body mass index is 30.68 kg/m².    CBC:   Lab Results   Component Value Date/Time    WBC 5.6 2025 12:51 PM    RBC 4.23 2025 12:51 PM    HGB 13.7 2025 12:51 PM    HCT 41.6 2025 12:51 PM    MCV 98.4 2025 12:51 PM    RDW 14.0 2025 12:51 PM     2025 12:51 PM       CMP:   Lab Results   Component Value Date/Time     2025 12:51 PM    K 4.5 2025 12:51 PM    K 4.4 2024 11:38 AM     2025 12:51 PM    CO2 26 2025 12:51 PM    BUN 18 2025 12:51 PM    CREATININE 1.1 2025 12:51 PM    GFRAA >60 2022 02:40 PM    GFRAA >60 2013 06:54 PM    AGRATIO 1.4 2024 11:38 AM    LABGLOM 62 2025 12:51 PM    LABGLOM >60 2023 02:42 PM    GLUCOSE 108 2025 12:51 PM    CALCIUM 10.4 2025 12:51 PM    BILITOT 0.5 2024 11:38 AM    ALKPHOS 107 2024 11:38 AM    AST 16 2024 11:38 AM    ALT 10

## 2025-03-10 NOTE — ANESTHESIA POSTPROCEDURE EVALUATION
Department of Anesthesiology  Postprocedure Note    Patient: Tashia Hewitt  MRN: [id number]  YOB: 1980  Date of evaluation: 3/10/2025    Procedure Summary       Date: 03/10/25 Room / Location: [address]    Anesthesia Start: 0847 Anesthesia Stop: 0945    Procedure: RIGHT SUPERFICIAL PAROTIDECTOMY WITH FACIAL NERVE MONITORING (Right: Neck) Diagnosis:       Warthin tumor      (Warthin tumor [D11.9])    Surgeons: Chanel Koenig DO Responsible Provider: Johnathan Toledo MD    Anesthesia Type: general ASA Status: 2            Anesthesia Type: No value filed.    Sai Phase I: Sai Score: 10    Sai Phase II: Sai Score: 10    Anesthesia Post Evaluation    Patient location during evaluation: bedside  Patient participation: complete - patient participated  Level of consciousness: awake and alert  Airway patency: patent  Nausea & Vomiting: no nausea  Cardiovascular status: hemodynamically stable  Respiratory status: acceptable  Hydration status: euvolemic  Pain management: adequate      Vitals:    03/10/25 0955 03/10/25 1000 03/10/25 1015 03/10/25 1020   BP: 120/62 117/65 100/63 100/68   Pulse: 62 83 63 66   Resp: 12 20 10 12   Temp:   97.5 °F (36.4 °C)    TempSrc:       SpO2:  97% 97% 97%   Weight:       Height:          No notable events documented.

## 2025-03-10 NOTE — DISCHARGE INSTRUCTIONS
[facility]  Chanel Koenig D.O.  [address]  [place], [place] [id number] [phone]    POST-OP Instructions for Neck Operations  Diet  Resume regular diet, as tolerated.  You may experience some nausea after surgery for up to 24 hours from the general anesthetic.  Take any pain medications with food to avoid upset stomach   Drink plenty of liquids    Activity  Avoid Straining, bending or lifting or vigorous exercise for 1 weeks  Keep the head of your bed elevated to reduce swelling for the first 72 hours  May shower 24 hours after surgery.   Let water run over the incision, do not scrub. Pat dry    General Instructions  Drainage from the incision during the first few days is expected.  If you have excessive bleeding or green drainage with surrounding redness please call the number above.      Medications  Resume your normal medications  Take antibiotics prescribed  Take pain medications as needed for pain  DO NOT take aspirin or aspirin products.  NO Advil, Motrin, Aleve, Ibuprofen for 1 week following surgery.  DO NOT use any herbal medicines/diet pills for 1 week after surgery.    Call the Office  Fever greater than 100.4  Sudden swelling in neck causing difficulty swallowing or breathing is an emergency.    Sudden increase in pain    ANESTHESIA DISCHARGE INSTRUCTIONS    You are under the influence of drugs- do not drink alcohol, drive a car, operate machinery(such as power tools, kitchen appliances, etc), sign legal documents, or make any important decisions for 24 hours (or while on pain medications).   Children should not ride bikes or skate boards or play on gym sets  for 24 hours after surgery.  A responsible adult should be with you for 24 hours.  Rest at home today- increase activity as tolerated.  Progress slowly to a regular diet unless your physician has instructed you otherwise. Drink plenty of water.    CALL YOUR DOCTOR IF YOU:  Have moderate to severe nausea or vomiting AND

## 2025-03-10 NOTE — OP NOTE
carried further with a Hemostat and fine dissector. The tumor was identified, and was gently dissected from the surrounding tissues using blunt technique with a Kitners and a Hemostat. The tumor was completely excised and areas of bleeding were controlled with bipolar cautery.     A Valsalva maneuver was performed and wound was inspected for bleeding and hemostasis was excellent. The wound was irrigated with normal saline. The platysmal layer was reapproximated with simple interrupted 3-0 Vicryl. The subcutaneous tissue was reapproximated with 4-0 Monocryl in simple interrupted fashion. The skin incision was closed with glue.     Control of the patient was then returned to the [facility] for wake up. The patient emerged from general anesthesia and was extubated without complication. At the end of the case, all sponge, instrument, and sharp counts were reported as correct. I was present and scrubbed for the entirety of the case, which I performed myself.     Electronically signed by Chanel Koenig DO on 3/10/2025 at 9:24 AM

## 2025-03-10 NOTE — INTERVAL H&P NOTE
Update History & Physical    The patient's History and Physical of March 6, 2025 was reviewed with the patient and I examined the patient. There was no change. The surgical site was confirmed by the patient and me.     Plan: The risks, benefits, expected outcome, and alternative to the recommended procedure have been discussed with the patient. Patient understands and wants to proceed with the procedure.     Electronically signed by Chanel Koenig DO on 3/10/2025 at 7:29 AM

## 2025-04-21 LAB
ALBUMIN: 4.1 G/DL
ALP BLD-CCNC: 111 U/L
ALT SERPL-CCNC: 8 U/L
ANION GAP SERPL CALCULATED.3IONS-SCNC: ABNORMAL MMOL/L
AST SERPL-CCNC: 13 U/L
BASOPHILS ABSOLUTE: 0 /ΜL
BASOPHILS RELATIVE PERCENT: 1 %
BILIRUB SERPL-MCNC: 0.4 MG/DL (ref 0.1–1.4)
BUN BLDV-MCNC: 14 MG/DL
CALCIUM SERPL-MCNC: 9.6 MG/DL
CHLORIDE BLD-SCNC: 104 MMOL/L
CHOLESTEROL, TOTAL: 184 MG/DL
CHOLESTEROL/HDL RATIO: NORMAL
CO2: 22 MMOL/L
CREAT SERPL-MCNC: 1.11 MG/DL
EOSINOPHILS ABSOLUTE: 0.2 /ΜL
EOSINOPHILS RELATIVE PERCENT: 5 %
ESTIMATED AVERAGE GLUCOSE: NORMAL
GFR, ESTIMATED: 62
GLUCOSE BLD-MCNC: 88 MG/DL
HBA1C MFR BLD: 5.5 %
HCT VFR BLD CALC: 39.7 % (ref 36–46)
HDLC SERPL-MCNC: 48 MG/DL (ref 35–70)
HEMOGLOBIN: 12.8 G/DL (ref 12–16)
LDL CHOLESTEROL: 116
LYMPHOCYTES ABSOLUTE: 1.9 /ΜL
LYMPHOCYTES RELATIVE PERCENT: 40 %
MCH RBC QN AUTO: 32.2 PG
MCHC RBC AUTO-ENTMCNC: 32.2 G/DL
MCV RBC AUTO: 100 FL
MONOCYTES ABSOLUTE: 0.2 /ΜL
MONOCYTES RELATIVE PERCENT: 5 %
NEUTROPHILS ABSOLUTE: 2.3 /ΜL
NEUTROPHILS RELATIVE PERCENT: 49 %
NONHDLC SERPL-MCNC: NORMAL MG/DL
PLATELET # BLD: 216 K/ΜL
PMV BLD AUTO: NORMAL FL
POTASSIUM SERPL-SCNC: 4.4 MMOL/L
RBC # BLD: 3.98 10^6/ΜL
SODIUM BLD-SCNC: 140 MMOL/L
T3 TOTAL: 2.5
T4 FREE: 0.77
TOTAL PROTEIN: 6.3 G/DL (ref 6.4–8.2)
TRIGL SERPL-MCNC: 113 MG/DL
TSH SERPL DL<=0.05 MIU/L-ACNC: 0.96 UIU/ML
VLDLC SERPL CALC-MCNC: 20 MG/DL
WBC # BLD: 4.6 10^3/ML

## 2025-05-05 ENCOUNTER — PATIENT MESSAGE (OUTPATIENT)
Dept: FAMILY MEDICINE CLINIC | Age: 45
End: 2025-05-05

## 2025-06-03 ENCOUNTER — PATIENT MESSAGE (OUTPATIENT)
Dept: FAMILY MEDICINE CLINIC | Age: 45
End: 2025-06-03

## 2025-06-03 DIAGNOSIS — L98.9 NODULAR LESION ON SURFACE OF SKIN: Primary | ICD-10-CM

## 2025-06-04 NOTE — TELEPHONE ENCOUNTER
Referral placed, please give scheduling info and fax referral thanks.     Referral Details       Referred By   Referred To    Marlene Leung PA   [address]   [address]   [facility] [address]   Phone: [phone]   Fax: [phone]    Diagnoses: Nodular lesion on surface of skin   Order: Non [facility] - External Referral To Dermatology   Reason: Specialty Services Required    [facility]  Address: [address] [place] [address] [place], [place] [address]  Phone: [phone]

## 2025-06-09 ENCOUNTER — OFFICE VISIT (OUTPATIENT)
Dept: FAMILY MEDICINE CLINIC | Age: 45
End: 2025-06-09
Payer: COMMERCIAL

## 2025-06-09 VITALS
RESPIRATION RATE: 18 BRPM | SYSTOLIC BLOOD PRESSURE: 100 MMHG | HEART RATE: 88 BPM | OXYGEN SATURATION: 92 % | DIASTOLIC BLOOD PRESSURE: 70 MMHG | WEIGHT: 155.6 LBS | BODY MASS INDEX: 28.93 KG/M2

## 2025-06-09 DIAGNOSIS — E28.319 EARLY MENOPAUSE: ICD-10-CM

## 2025-06-09 DIAGNOSIS — R53.83 FATIGUE, UNSPECIFIED TYPE: ICD-10-CM

## 2025-06-09 DIAGNOSIS — L98.9 SKIN LESION OF FACE: ICD-10-CM

## 2025-06-09 DIAGNOSIS — R63.4 WEIGHT LOSS: Primary | ICD-10-CM

## 2025-06-09 DIAGNOSIS — R79.89 LOW T4: ICD-10-CM

## 2025-06-09 PROBLEM — F31.9 BIPOLAR DISORDER (HCC): Status: RESOLVED | Noted: 2019-01-23 | Resolved: 2025-06-09

## 2025-06-09 PROBLEM — F33.0 MDD (MAJOR DEPRESSIVE DISORDER), RECURRENT EPISODE, MILD: Status: RESOLVED | Noted: 2018-04-17 | Resolved: 2025-06-09

## 2025-06-09 PROBLEM — N17.9 AKI (ACUTE KIDNEY INJURY): Status: RESOLVED | Noted: 2021-10-02 | Resolved: 2025-06-09

## 2025-06-09 PROCEDURE — 1036F TOBACCO NON-USER: CPT | Performed by: NURSE PRACTITIONER

## 2025-06-09 PROCEDURE — G8427 DOCREV CUR MEDS BY ELIG CLIN: HCPCS | Performed by: NURSE PRACTITIONER

## 2025-06-09 PROCEDURE — G8417 CALC BMI ABV UP PARAM F/U: HCPCS | Performed by: NURSE PRACTITIONER

## 2025-06-09 PROCEDURE — 99214 OFFICE O/P EST MOD 30 MIN: CPT | Performed by: NURSE PRACTITIONER

## 2025-06-09 ASSESSMENT — PATIENT HEALTH QUESTIONNAIRE - PHQ9
SUM OF ALL RESPONSES TO PHQ QUESTIONS 1-9: 0
8. MOVING OR SPEAKING SO SLOWLY THAT OTHER PEOPLE COULD HAVE NOTICED. OR THE OPPOSITE, BEING SO FIGETY OR RESTLESS THAT YOU HAVE BEEN MOVING AROUND A LOT MORE THAN USUAL: NOT AT ALL
SUM OF ALL RESPONSES TO PHQ QUESTIONS 1-9: 0
5. POOR APPETITE OR OVEREATING: NOT AT ALL
10. IF YOU CHECKED OFF ANY PROBLEMS, HOW DIFFICULT HAVE THESE PROBLEMS MADE IT FOR YOU TO DO YOUR WORK, TAKE CARE OF THINGS AT HOME, OR GET ALONG WITH OTHER PEOPLE: NOT DIFFICULT AT ALL
1. LITTLE INTEREST OR PLEASURE IN DOING THINGS: NOT AT ALL
SUM OF ALL RESPONSES TO PHQ QUESTIONS 1-9: 0
6. FEELING BAD ABOUT YOURSELF - OR THAT YOU ARE A FAILURE OR HAVE LET YOURSELF OR YOUR FAMILY DOWN: NOT AT ALL
9. THOUGHTS THAT YOU WOULD BE BETTER OFF DEAD, OR OF HURTING YOURSELF: NOT AT ALL
3. TROUBLE FALLING OR STAYING ASLEEP: NOT AT ALL
4. FEELING TIRED OR HAVING LITTLE ENERGY: NOT AT ALL
7. TROUBLE CONCENTRATING ON THINGS, SUCH AS READING THE NEWSPAPER OR WATCHING TELEVISION: NOT AT ALL
2. FEELING DOWN, DEPRESSED OR HOPELESS: NOT AT ALL
SUM OF ALL RESPONSES TO PHQ QUESTIONS 1-9: 0

## 2025-06-09 NOTE — PROGRESS NOTES
Tashia Hewitt (:  1980) is a 45 y.o. female,Established patient, here for evaluation of the following chief complaint(s):  Referral - General (Dermatology and OBGYN ) and labs (Would like labs checked for menopause )         Assessment & Plan  Weight loss        Orders:    Dara Jackson MD, Endocrinology, [place]    Fatigue, unspecified type        Orders:    Dara Jackson MD, Endocrinology, [place]    Early menopause        Orders:    Dara Jackson MD, Endocrinology, [place]    Skin lesion of face        Orders:    Non [facility] - External Referral To Dermatology    Low T4       Orders:    Dara Jackson MD, Endocrinology, [place]    Reviewed recent labs and not able to see indication of low sodium or calcium. CT scan abdomen and pelvis 2023 and 2022 do not indicate abnormality of adrenal. Unable to find MRI/CT scan of head. It seems there are some records we do not have available that she has at home.   Referral placed a requested.   No follow-ups on file.       Subjective   HPI    Hx of parotid mass removed that was benign 2 months ago. Then started looking into her medical records and she noticed mention of thickening adrenal or pituitary, she is not sure.  Has record of labs in the past where \"sodium dropped out and calcium was high\". Would like to be seen by endocrinologist.     Multiple dark skin lesion on face. Has areas scattered on legs that are soft and rolling. Wishes to see a dermatologist. [facility]ed [facility] in [place] but does not take her insurance.     Has weight gain, menopause in age 30's, depression.     Had labs done 3 months ago by psychiatrist, Dr. Donal Lamas. Advised to send results to PCP.     Review of Systems   All other systems reviewed and are negative.         Objective   Physical Exam  Constitutional:       Appearance: Normal appearance. She is well-developed.   HENT:      Head: Normocephalic and

## 2025-06-23 ENCOUNTER — TELEPHONE (OUTPATIENT)
Dept: ENDOCRINOLOGY | Age: 45
End: 2025-06-23

## 2025-06-24 ENCOUNTER — PATIENT MESSAGE (OUTPATIENT)
Dept: FAMILY MEDICINE CLINIC | Age: 45
End: 2025-06-24

## 2025-06-24 DIAGNOSIS — L98.9 SKIN LESION OF FACE: Primary | ICD-10-CM

## 2025-06-24 DIAGNOSIS — R53.83 FATIGUE, UNSPECIFIED TYPE: ICD-10-CM

## 2025-06-24 DIAGNOSIS — M25.50 POLYARTHRALGIA: ICD-10-CM

## 2025-06-24 NOTE — TELEPHONE ENCOUNTER
Called [facility], confirmed they can see the referral from 6/9 and are not sure why patient was told that there is no referral.  Basil reports no issue with insurance and patient can schedule at their convenience.      Called Ruth, Dermatologist of [facility], non take patient's insurance.    Checked the [facility] provider finder and called [facility], confirmed they do take patient's insurance.    Referral pended and faxed to [phone]    Left message for patient to call the office.    Please get me on the line.

## 2025-06-24 NOTE — TELEPHONE ENCOUNTER
Called [facility] again, who confirmed they are in fact seeing [facility] patients and are taking new [facility] patients.

## 2025-06-24 NOTE — TELEPHONE ENCOUNTER
Called [facility].  Dr. Borrego is no longer with [facility] and they are booking out 8 months currently.

## 2025-06-30 ENCOUNTER — HOSPITAL ENCOUNTER (OUTPATIENT)
Dept: LAB | Age: 45
Discharge: HOME | End: 2025-06-30
Payer: COMMERCIAL

## 2025-06-30 DIAGNOSIS — M25.50 POLYARTHRALGIA: ICD-10-CM

## 2025-06-30 DIAGNOSIS — R53.83 FATIGUE, UNSPECIFIED TYPE: ICD-10-CM

## 2025-06-30 LAB
CRP SERPL-MCNC: <3 MG/L (ref 0–5.1)
ERYTHROCYTE [SEDIMENTATION RATE] IN BLOOD BY WESTERGREN METHOD: 5 MM/HR (ref 0–20)
RHEUMATOID FACT SER IA-ACNC: 18.8 IU/ML

## 2025-06-30 PROCEDURE — 86038 ANTINUCLEAR ANTIBODIES: CPT

## 2025-06-30 PROCEDURE — 36415 COLL VENOUS BLD VENIPUNCTURE: CPT

## 2025-06-30 PROCEDURE — 86431 RHEUMATOID FACTOR QUANT: CPT

## 2025-06-30 PROCEDURE — 85652 RBC SED RATE AUTOMATED: CPT

## 2025-06-30 PROCEDURE — 86140 C-REACTIVE PROTEIN: CPT

## 2025-07-01 LAB — ANA SER QL IA: NEGATIVE

## 2025-07-03 ENCOUNTER — RESULTS FOLLOW-UP (OUTPATIENT)
Dept: FAMILY MEDICINE CLINIC | Age: 45
End: 2025-07-03

## 2025-07-03 DIAGNOSIS — M25.50 POLYARTHRALGIA: Primary | ICD-10-CM

## 2025-07-03 DIAGNOSIS — R76.8 RHEUMATOID FACTOR POSITIVE: ICD-10-CM

## 2025-07-03 NOTE — TELEPHONE ENCOUNTER
Gume Feliciano, can you fax recent labs and referral to [facility] rheumatology. Pt is hoping to get scheduled asap and mariana is out today. Thanks!

## 2025-07-07 ENCOUNTER — OFFICE VISIT (OUTPATIENT)
Dept: ENDOCRINOLOGY | Age: 45
End: 2025-07-07
Payer: COMMERCIAL

## 2025-07-07 VITALS
RESPIRATION RATE: 16 BRPM | BODY MASS INDEX: 27.79 KG/M2 | HEART RATE: 91 BPM | SYSTOLIC BLOOD PRESSURE: 124 MMHG | WEIGHT: 151 LBS | DIASTOLIC BLOOD PRESSURE: 74 MMHG | HEIGHT: 62 IN

## 2025-07-07 DIAGNOSIS — R79.89 LOW T4: ICD-10-CM

## 2025-07-07 DIAGNOSIS — D35.01 ADRENAL ADENOMA, RIGHT: Primary | ICD-10-CM

## 2025-07-07 LAB
T4 FREE SERPL-MCNC: 1.1 NG/DL (ref 0.9–1.8)
TSH SERPL DL<=0.005 MIU/L-ACNC: 1.97 UIU/ML (ref 0.27–4.2)

## 2025-07-07 PROCEDURE — G8417 CALC BMI ABV UP PARAM F/U: HCPCS | Performed by: STUDENT IN AN ORGANIZED HEALTH CARE EDUCATION/TRAINING PROGRAM

## 2025-07-07 PROCEDURE — 1036F TOBACCO NON-USER: CPT | Performed by: STUDENT IN AN ORGANIZED HEALTH CARE EDUCATION/TRAINING PROGRAM

## 2025-07-07 PROCEDURE — G8427 DOCREV CUR MEDS BY ELIG CLIN: HCPCS | Performed by: STUDENT IN AN ORGANIZED HEALTH CARE EDUCATION/TRAINING PROGRAM

## 2025-07-07 PROCEDURE — 99204 OFFICE O/P NEW MOD 45 MIN: CPT | Performed by: STUDENT IN AN ORGANIZED HEALTH CARE EDUCATION/TRAINING PROGRAM

## 2025-07-07 NOTE — PROGRESS NOTES
motion.      Cervical back: Normal range of motion.   Skin:     General: Skin is warm and dry.   Neurological:      General: No focal deficit present.      Mental Status:   alert. Mental status is at baseline.   Psychiatric:         Mood and Affect: Mood normal.      1. Adrenal adenoma, right  Noted on CT abdomen in May 2020, subsequent CT scans did not reveal adenoma  CT abdo- in 2023, 2022, 2021, 2020- normal adrenal glands   CT abdo- may 2020- The right adrenal gland demonstrates an ovoid 1.6 cm hypodense mass with noncontrasted average Hounsfield units of -14 consistent with a lipid rich adenoma  CT abdo- jan 2018 - There is thickening of the adrenal glands bilaterally, similar to prior, either due to adenoma or hyperplasia    I discussed the findings of the CT scans--will ask the radiologist to review the recent CT abdomen results, to check if the adenoma is still present and compare it to May 2020 CT finding  If there is no adenoma, no further workup is indicated  If there is presence of adrenal adenoma, will check dexamethasone suppression test    2. Low T4  Recheck labs today, do not suspect hypothyroidism based on normal TSH, suspect lab interference  - TSH; Future  - T4, Free; Future

## 2025-07-08 ENCOUNTER — RESULTS FOLLOW-UP (OUTPATIENT)
Dept: ENDOCRINOLOGY | Age: 45
End: 2025-07-08

## 2025-07-09 ENCOUNTER — TELEPHONE (OUTPATIENT)
Dept: FAMILY MEDICINE CLINIC | Age: 45
End: 2025-07-09

## 2025-07-09 NOTE — TELEPHONE ENCOUNTER
Process for requesting amendments to medical record and contact number shared with patient via preferred method of written message via [facility].

## 2025-07-17 ENCOUNTER — HOSPITAL ENCOUNTER (EMERGENCY)
Age: 45
Discharge: HOME | End: 2025-07-17
Attending: STUDENT IN AN ORGANIZED HEALTH CARE EDUCATION/TRAINING PROGRAM
Payer: COMMERCIAL

## 2025-07-17 VITALS
RESPIRATION RATE: 18 BRPM | DIASTOLIC BLOOD PRESSURE: 82 MMHG | HEART RATE: 78 BPM | SYSTOLIC BLOOD PRESSURE: 120 MMHG | TEMPERATURE: 98.1 F | OXYGEN SATURATION: 97 %

## 2025-07-17 DIAGNOSIS — T14.8XXA BRUISING: Primary | ICD-10-CM

## 2025-07-17 LAB
ALBUMIN SERPL-MCNC: 4 G/DL (ref 3.4–5)
ALBUMIN/GLOB SERPL: 1.4 {RATIO} (ref 1.1–2.2)
ALP SERPL-CCNC: 94 U/L (ref 40–129)
ALT SERPL-CCNC: 11 U/L (ref 10–40)
ANION GAP SERPL CALCULATED.3IONS-SCNC: 13 MMOL/L (ref 3–16)
AST SERPL-CCNC: 16 U/L (ref 15–37)
BASOPHILS # BLD: 0.1 K/UL (ref 0–0.2)
BASOPHILS NFR BLD: 1.3 %
BILIRUB SERPL-MCNC: 0.5 MG/DL (ref 0–1)
BUN SERPL-MCNC: 16 MG/DL (ref 7–20)
CA-I BLD-SCNC: 1.27 MMOL/L (ref 1.12–1.32)
CALCIUM SERPL-MCNC: 9.3 MG/DL (ref 8.3–10.6)
CHLORIDE SERPL-SCNC: 104 MMOL/L (ref 99–110)
CO2 SERPL-SCNC: 24 MMOL/L (ref 21–32)
CREAT SERPL-MCNC: 1 MG/DL (ref 0.6–1.1)
DEPRECATED RDW RBC AUTO: 12.9 % (ref 12.4–15.4)
EOSINOPHIL # BLD: 0.1 K/UL (ref 0–0.6)
EOSINOPHIL NFR BLD: 2.5 %
GFR SERPLBLD CREATININE-BSD FMLA CKD-EPI: 70 ML/MIN/{1.73_M2}
GLUCOSE SERPL-MCNC: 108 MG/DL (ref 70–99)
HCG SERPL QL: NEGATIVE
HCT VFR BLD AUTO: 40.3 % (ref 36–48)
HGB BLD-MCNC: 13.4 G/DL (ref 12–16)
INR PPP: 0.95 (ref 0.86–1.14)
LYMPHOCYTES # BLD: 2.6 K/UL (ref 1–5.1)
LYMPHOCYTES NFR BLD: 42.9 %
MAGNESIUM SERPL-MCNC: 2.01 MG/DL (ref 1.8–2.4)
MCH RBC QN AUTO: 32.2 PG (ref 26–34)
MCHC RBC AUTO-ENTMCNC: 33.2 G/DL (ref 31–36)
MCV RBC AUTO: 96.9 FL (ref 80–100)
MONOCYTES # BLD: 0.3 K/UL (ref 0–1.3)
MONOCYTES NFR BLD: 5.1 %
NEUTROPHILS # BLD: 2.9 K/UL (ref 1.7–7.7)
NEUTROPHILS NFR BLD: 48.2 %
PH BLDV: 7.38 [PH] (ref 7.35–7.45)
PLATELET # BLD AUTO: 216 K/UL (ref 135–450)
PMV BLD AUTO: 10.1 FL (ref 5–10.5)
POTASSIUM SERPL-SCNC: 3.5 MMOL/L (ref 3.5–5.1)
PROT SERPL-MCNC: 6.9 G/DL (ref 6.4–8.2)
PROTHROMBIN TIME: 13 SEC (ref 12.1–14.9)
RBC # BLD AUTO: 4.16 M/UL (ref 4–5.2)
SODIUM SERPL-SCNC: 141 MMOL/L (ref 136–145)
TSH SERPL DL<=0.005 MIU/L-ACNC: 0.63 UIU/ML (ref 0.27–4.2)
WBC # BLD AUTO: 6 K/UL (ref 4–11)

## 2025-07-17 PROCEDURE — 82533 TOTAL CORTISOL: CPT

## 2025-07-17 PROCEDURE — 84443 ASSAY THYROID STIM HORMONE: CPT

## 2025-07-17 PROCEDURE — 83735 ASSAY OF MAGNESIUM: CPT

## 2025-07-17 PROCEDURE — 82330 ASSAY OF CALCIUM: CPT

## 2025-07-17 PROCEDURE — 80053 COMPREHEN METABOLIC PANEL: CPT

## 2025-07-17 PROCEDURE — 85610 PROTHROMBIN TIME: CPT

## 2025-07-17 PROCEDURE — 85025 COMPLETE CBC W/AUTO DIFF WBC: CPT

## 2025-07-17 PROCEDURE — 99283 EMERGENCY DEPT VISIT LOW MDM: CPT

## 2025-07-17 PROCEDURE — 84703 CHORIONIC GONADOTROPIN ASSAY: CPT

## 2025-07-17 PROCEDURE — 36415 COLL VENOUS BLD VENIPUNCTURE: CPT

## 2025-07-17 ASSESSMENT — PAIN SCALES - GENERAL: PAINLEVEL_OUTOF10: 4

## 2025-07-17 ASSESSMENT — LIFESTYLE VARIABLES
HOW OFTEN DO YOU HAVE A DRINK CONTAINING ALCOHOL: NEVER
HOW MANY STANDARD DRINKS CONTAINING ALCOHOL DO YOU HAVE ON A TYPICAL DAY: PATIENT DOES NOT DRINK

## 2025-07-17 NOTE — DISCHARGE INSTRUCTIONS
You were evaluated in the emergency department for skin bruising and associated complaints. Assessments and testing completed during your visit were reassuring and at this time there is no indication for further testing, treatment or admission to the hospital. Given this it is appropriate to discharge you from the emergency department. At the time of discharge we discussed the following:    Please discuss further with your primary doctor     Please note that sometimes it is difficult to diagnose a medical condition early in the disease process before the disease is fully manifest. Because of this, should you develop any new or worsening symptoms, you may return at any time to the emergency department for another evaluation. If available you are also recommended to review this visit with your primary care physician or other medical provider in the next 7 days. Thank you for allowing us to care for you today.

## 2025-07-17 NOTE — ED PROVIDER NOTES
[facility] EMERGENCY DEPARTMENT     EMERGENCY DEPARTMENT ENCOUNTER         Pt Name: Tashia Hewitt   MRN: [id number]   Birthdate 1980   Date of evaluation: 7/17/2025   Provider: Primo Bravo MD   PCP: Marlene Leung PA   Note Started: 3:31 PM EDT 7/17/25       Chief Complaint     bruises (States bruises everywhere, maybe joint paint. Had tumor removed beginning of year. States skin now involved, \"soft and cullen.\" States cracking when straightening out )      History of Present Illness     Tashia Hewitt is a 45 y.o. female who presents with a constellation of subacute to chronic concerns.  The patient states that she reportedly had an adrenal adenoma on imaging which has since disappeared but she fears a condition of her adrenal function.  She also has a history of parotid tumor status post resection.  She states that for some time now she has been concern for bruising and unintentional weight loss.  She is concerned for the condition of her skin as far as texture and character.  She reports various scars of unclear etiology with no reported trauma.  She states that sometimes she will pull on \"a string in the skin\" and subsequently leaves a scar.  She is concern for bruises of unclear etiology.  She is concern for petechiae.  She states that her joints are cracking.  She has been documenting her various complaints at length with imaging which she presents on her computer on assessment.  She also states that she feels some mild confusion.  She raises concern that she is numb \"inside\".  She states she feels unsteady on her feet.  It appears she has been cultivating a dialogue with her primary who on most recent exchange is recommended a visit to the clinic for evaluation.  Patient states that her various concerns are compromising her activities and therefore she presents to the emergency department primarily seeking lab work to search for any high risk conditions.  Of note she denies  surveillance by her primary.    ED Course as of 07/17/25 1839   u Jul 17, 2025 1818 WBC: 6.0 [NG]   1818 Hemoglobin Quant: 13.4 [NG]   1818 Hematocrit: 40.3 [NG]   1818 Platelet Count: 216  Blood counts benign [NG]   1818 Sodium: 141 [NG]   1818 Potassium: 3.5 [NG]   1818 Chloride: 104 [NG]   1818 CARBON DIOXIDE: 24 [NG]   1818 Anion Gap: 13 [NG]   1818 Glucose(!): 108 [NG]   1818 BUN,BUNPL: 16 [NG]   1818 Creatinine: 1.0 [NG]   1818 Calcium: 9.3 [NG]   1818 Total Bilirubin: 0.5 [NG]   1818 Alkaline Phosphatase: 94 [NG]   1818 ALT: 11 [NG]   1818 AST: 16  CMP benign [NG]   1819 INR: 0.95  INR normal in the context of patient's concern for bruising [NG]   1819 TSH Reflex FT4: 0.63  TSH within normal limits [NG]   1819 Calcium, Ionized: 1.27  Ionized calcium benign [NG]   1819 Magnesium: 2.01  Magnesium adequate [NG]   1819 Patient's workup has been unrevealing will reassess her clinical condition believe she is safe for discharge continued outpatient management per primary [NG]   1839 I reassessed the patient she is resting comfortably presenting tachycardia is resolved her workup has been unrevealing.  At this time via shared decision making she will discharge for continued outpatient management per primary. [NG]      ED Course User Index  [NG] Primo Bravo MD       Additional Reassessment    Is this patient to be included in the SEP-1 core measure? No Exclusion criteria - the patient is NOT to be included for SEP-1 Core Measure due to: Infection is not suspected    Medical Decision Making  Presentation for bruising and other subacute complaints reassuring course without high risk features amenable to continued outpatient management.    Problems Addressed:  Bruising: acute illness or injury    Amount and/or Complexity of Data Reviewed  Labs: ordered. Decision-making details documented in ED Course.    Risk  Decision regarding hospitalization.          The patient was given the followingmedications:  No orders

## 2025-07-18 ENCOUNTER — TELEPHONE (OUTPATIENT)
Dept: FAMILY MEDICINE CLINIC | Age: 45
End: 2025-07-18

## 2025-07-18 LAB — CORTIS SERPL-MCNC: 9.5 UG/DL

## 2025-07-18 NOTE — TELEPHONE ENCOUNTER
Called and left  for patient to call back and schedule ED follow up. Received a report she was in the ER on 7/17. Thanks!

## (undated) DEVICE — SUTURE NONABSORBABLE MONOFILAMENT 5-0 PS-2 18 IN BLK ETHILON 1666H

## (undated) DEVICE — MINOR SET UP PACK: Brand: MEDLINE INDUSTRIES, INC.

## (undated) DEVICE — STERILE COTTON BALLS LARGE 5/P: Brand: MEDLINE

## (undated) DEVICE — PREP SOL PVP IODINE 4%  4 OZ/BTL

## (undated) DEVICE — SUTURE VCRL + SZ 3-0 L27IN ABSRB UD L26MM SH 1/2 CIR VCP416H

## (undated) DEVICE — NITINOL STONE RETRIEVAL BASKET: Brand: ZERO TIP

## (undated) DEVICE — Z DUP USE 2139333 GUIDEWIRE UROLOGICAL STR STD 0.035 IN X150 CM REG ZIPWIRE LF

## (undated) DEVICE — CATHETER,URETHRAL,REDRUBBER,STRL,18FR: Brand: MEDLINE

## (undated) DEVICE — ELECTRODE 8227411 PAIRED 4 CH SET ROHS

## (undated) DEVICE — CATHETER URETH 16FR 5CC BLLN SIL ELASTMR F 2 W

## (undated) DEVICE — DBD-PACK,CYSTOSCOPY,PK VI,AURORA: Brand: MEDLINE

## (undated) DEVICE — Z DISCONTINUED SUG SUB M0068405911 FIBER LSR 200UM ENDOSCP FLEXSHIELD HOLM HI PWR POLISHED

## (undated) DEVICE — GOWN SIRUS NONREIN LG W/TWL: Brand: MEDLINE INDUSTRIES, INC.

## (undated) DEVICE — INVIEW CLEAR LEGGINGS: Brand: CONVERTORS

## (undated) DEVICE — CHLORAPREP 26ML ORANGE

## (undated) DEVICE — SOLUTION IRRIG 2000ML 0.9% SOD CHL USP UROMATIC PLAS CONT

## (undated) DEVICE — Device: Brand: MEDEX

## (undated) DEVICE — CORD ES L10FT BLU MPLR FT SWCH DISP ACMI

## (undated) DEVICE — GLOVE,SURG,SENSICARE,ALOE,LF,PF,6: Brand: MEDLINE

## (undated) DEVICE — BLADE ES ELASTOMERIC COAT INSUL DURABLE BEND UPTO 90DEG

## (undated) DEVICE — PROBE 8225101 5PK STD PRASS FL TIP ROHS

## (undated) DEVICE — SURGICAL PROCEDURE PACK IV U-BAR

## (undated) DEVICE — SUTURE PERMAHAND SZ 2-0 L18IN NONABSORBABLE BLK L26MM SH C012D

## (undated) DEVICE — SUTURE MONOCRYL + SZ 4-0 L18IN ABSRB UD L19MM PS-2 3/8 CIR MCP496G

## (undated) DEVICE — GLOVE SURG SZ 65 L12IN FNGR THK79MIL GRN LTX FREE

## (undated) DEVICE — MINOR SET UP: Brand: MEDLINE INDUSTRIES, INC.

## (undated) DEVICE — GLOVE ORANGE PI 7 1/2   MSG9075

## (undated) DEVICE — HIGH PRESSURE NEPHROSTOMY BALLOON CATHETER KIT: Brand: NEPHROMAX KIT

## (undated) DEVICE — COOK-COPE LOOP NEPHROSTOMY CATHETER: Brand: COOK-COPE LOOP

## (undated) DEVICE — OCCLUSION BALLOON CATHETER: Brand: OCCLUDER

## (undated) DEVICE — PROBE LITHO L403MM DIA3.8MM US SWISS LITHOCLAST

## (undated) DEVICE — CORD ES L12FT BPLR FRCP

## (undated) DEVICE — DRAPE,NEPHROSCOPY,STERILE: Brand: MEDLINE

## (undated) DEVICE — CIRCUIT ANES L72IN 3L BACT AND VIR FLTR EL CONN SGL LIMB

## (undated) DEVICE — CRADLE POS PRONE 24 X 5 X 3 IN ARM N COMPR NO CVR FOAM DISP

## (undated) DEVICE — Z CONVERTED USE 2271043 CONTAINER SPEC COLL 4OZ SCR ON LID PEEL PCH

## (undated) DEVICE — GAUZE,SPONGE,4"X4",8PLY,STRL,LF,10/TRAY: Brand: MEDLINE

## (undated) DEVICE — BAG DRNGE 2000ML ROUNDED TEARDROP W/ ANTIREFLX CHMBR DISP

## (undated) DEVICE — SOLUTION IRRIG 2000ML STRL H2O UROMATIC PLAS CONT USP

## (undated) DEVICE — Z DUP USE 2522782 SOLUTION IRRIG 1000ML STRL H2O PLAS CONTAINER UROMATIC

## (undated) DEVICE — SOLUTION IV IRRIG 500ML 0.9% SODIUM CHL 2F7123

## (undated) DEVICE — SUTURE VICRYL + SZ 3-0 L18IN ABSRB UD SH 1/2 CIR TAPERCUT NDL VCP864D

## (undated) DEVICE — 1010 S-DRAPE TOWEL DRAPE 10/BX: Brand: STERI-DRAPE™

## (undated) DEVICE — GLOVE ORANGE PI 7   MSG9070

## (undated) DEVICE — GOWN,SIRUS,NON REINFRCD,LARGE,SET IN SL: Brand: MEDLINE

## (undated) DEVICE — BAG URIN STRL FOR URO CTCH SYS

## (undated) DEVICE — BOWL MED L 32OZ PLAS W/ MOLD GRAD EZ OPN PEEL PCH

## (undated) DEVICE — SYRINGE MED 30ML STD CLR PLAS LUERLOCK TIP N CTRL DISP

## (undated) DEVICE — CYSTO/BLADDER IRRIGATION SET, REGULATING CLAMP

## (undated) DEVICE — SYRINGE MED 10ML LUERLOCK TIP W/O SFTY DISP

## (undated) DEVICE — SKIN PREP TRAY 4 COMPARTM TRAY: Brand: MEDLINE INDUSTRIES, INC.

## (undated) DEVICE — STONE RETRIEVAL BASKET: Brand: SEGURA HEMISPHERE

## (undated) DEVICE — SHEET,DRAPE,53X77,STERILE: Brand: MEDLINE

## (undated) DEVICE — STAPLER SKIN H3.9MM WIRE DIA0.58MM CRWN 6.9MM 35 STPL ROT

## (undated) DEVICE — GLOVE SURG SZ 7 L12IN FNGR THK79MIL GRN LTX FREE

## (undated) DEVICE — CATHETER URET 5FR L70CM TIP 8FR OPN END CONE TIP INJ HUB

## (undated) DEVICE — 20 ML SYRINGE LUER-LOCK TIP: Brand: MONOJECT

## (undated) DEVICE — MARKER SURG SKIN UTIL REGULAR/FINE 2 TIP W/ RUL AND 9 LBL

## (undated) DEVICE — BAG DRNGE COMB PK

## (undated) DEVICE — CYSTO: Brand: MEDLINE INDUSTRIES, INC.

## (undated) DEVICE — SUTURE PERMA-HAND SZ 2-0 L30IN NONABSORBABLE BLK L26MM SH K833H

## (undated) DEVICE — GUIDEWIRE ENDO L150CM DIA0.035IN STR TIP

## (undated) DEVICE — DEVICE INFL 20CC HI PRSS L PRNT GA DIAL W/ FNGR LATCH

## (undated) DEVICE — OPEN-END FLEXI-TIP URETERAL CATHETER: Brand: FLEXI-TIP

## (undated) DEVICE — SYRINGE BLB 50CC IRRIG PLIABLE FNGR FLNG GRAD FLSK DISP

## (undated) DEVICE — INTENDED FOR TISSUE SEPARATION, AND OTHER PROCEDURES THAT REQUIRE A SHARP SURGICAL BLADE TO PUNCTURE OR CUT.: Brand: BARD-PARKER ® CARBON RIB-BACK BLADES

## (undated) DEVICE — MEDI-VAC NON-CONDUCTIVE SUCTION TUBING: Brand: CARDINAL HEALTH

## (undated) DEVICE — SPONGE,DISSECTOR,K,XRAY,9/16"X1/4",STRL: Brand: MEDLINE

## (undated) DEVICE — SHEARS ENDOSCP L9CM CRV HARM FOCS +

## (undated) DEVICE — DRAPE,U/ SHT,SPLIT,PLAS,STERIL: Brand: MEDLINE

## (undated) DEVICE — GOWN SIRUS NONREIN XL W/TWL: Brand: MEDLINE INDUSTRIES, INC.

## (undated) DEVICE — DRAPE,INSTRUMENT,MAGNETIC,10X16: Brand: MEDLINE

## (undated) DEVICE — GUIDEWIRE ENDOSCP L150CM DIA0.035IN TIP 3CM PTFE NIT

## (undated) DEVICE — Y-TYPE TUR/BLADDER IRRIGATION SET, REGULATING CLAMP

## (undated) DEVICE — CATCHER STONE TBNG ADPT SWISS LITHOCLAST

## (undated) DEVICE — 1016 S-DRAPE IRRIG POUCH 10/BOX: Brand: STERI-DRAPE™

## (undated) DEVICE — HEAD AND NECK PACK: Brand: CONVERTORS